# Patient Record
Sex: MALE | Race: WHITE | NOT HISPANIC OR LATINO | Employment: OTHER | URBAN - METROPOLITAN AREA
[De-identification: names, ages, dates, MRNs, and addresses within clinical notes are randomized per-mention and may not be internally consistent; named-entity substitution may affect disease eponyms.]

---

## 2017-01-13 ENCOUNTER — HOSPITAL ENCOUNTER (EMERGENCY)
Facility: HOSPITAL | Age: 47
Discharge: HOME | End: 2017-01-13
Attending: EMERGENCY MEDICINE
Payer: COMMERCIAL

## 2017-01-13 VITALS
DIASTOLIC BLOOD PRESSURE: 76 MMHG | RESPIRATION RATE: 20 BRPM | HEIGHT: 64 IN | SYSTOLIC BLOOD PRESSURE: 112 MMHG | OXYGEN SATURATION: 96 % | TEMPERATURE: 98 F | BODY MASS INDEX: 28.34 KG/M2 | WEIGHT: 166 LBS | HEART RATE: 90 BPM

## 2017-01-13 DIAGNOSIS — J06.9 UPPER RESPIRATORY TRACT INFECTION, UNSPECIFIED TYPE: Primary | ICD-10-CM

## 2017-01-13 PROCEDURE — 99283 EMERGENCY DEPT VISIT LOW MDM: CPT

## 2017-01-13 RX ORDER — AZITHROMYCIN 250 MG/1
250 TABLET, FILM COATED ORAL DAILY
Qty: 6 TABLET | Refills: 0 | Status: SHIPPED | OUTPATIENT
Start: 2017-01-13 | End: 2017-01-23

## 2017-01-13 NOTE — ED AVS SNAPSHOT
[facility] [address] [place] [address]               Logan Haywood   2017  8:38 PM   ED    Description:  Male : 1970   Department:  [facility]           Your Care was Coordinated By:     Provider Role From To    Jimmy Cisneros MD Attending Provider 17 [id number] --      Reason for Visit     URI           Diagnoses this Visit        Comments    Upper respiratory tract infection, unspecified type    -  Primary       ED Disposition     None           To Do List           Follow-up Information     Follow up with Primary Doctor No.        Follow up with [facility].    Specialty:  Emergency Medicine    Why:  If symptoms worsen.  Patient should return for any concerns or worsening of condition.    Contact information:    [address] [place] [id number] [phone]       These Medications        Disp Refills Start End    azithromycin (Z-LUIGI) 250 MG tablet 6 tablet 0 2017    Take 1 tablet (250 mg total) by mouth once daily. Take first 2 tablets together, then 1 every day until finished. - Oral      [facility]     [facility] -  Assistance  Registered nurses in the [facility] provide clinical advisement, health education, appointment booking, and other advisory services.  Call for this free service at [phone].             Medications           Message regarding Medications     Verify the changes and/or additions to your medication regime listed below are the same as discussed with your clinician today.  If any of these changes or additions are incorrect, please notify your healthcare provider.        START taking these NEW medications        Refills    azithromycin (Z-LUIGI) 250 MG tablet 0    Sig: Take 1 tablet (250 mg total) by mouth once daily. Take first 2 tablets together, then 1 every day until finished.     "Class: Print    Route: Oral           Verify that the below list of medications is an accurate representation of the medications you are currently taking.  If none reported, the list may be blank. If incorrect, please contact your healthcare provider. Carry this list with you in case of emergency.           Current Medications     azithromycin (Z-LUIGI) 250 MG tablet Take 1 tablet (250 mg total) by mouth once daily. Take first 2 tablets together, then 1 every day until finished.    diclofenac (VOLTAREN) 50 MG EC tablet Take 1 tablet (50 mg total) by mouth 3 (three) times daily as needed.    promethazine-dextromethorphan (PROMETHAZINE-DM) 6.25-15 mg/5 mL Syrp 1-2 tsp PO every night prn cough    silver sulfADIAZINE 1% (SILVADENE) 1 % cream Apply topically 2 (two) times daily.           Clinical Reference Information           Your Vitals Were     BP Pulse Temp Resp Height Weight    112/76 (BP Location: Left arm, Patient Position: Sitting) 90 98 °F (36.7 °C) (Oral) 20 5' 4" (1.626 m) 75.3 kg (166 lb)    SpO2 BMI             96% 28.49 kg/m2         Allergies as of 1/13/2017        Reactions    Cat/feline Products     Chocolate Flavor Itching      Immunizations Administered on Date of Encounter - 1/13/2017     None      ED Micro, Lab, POCT     None      ED Imaging Orders     None        Discharge Instructions         Viral Upper Respiratory Illness with Wheezing (Adult)  You have a viral upper respiratory illness (URI), which is another term for the common cold. When the infection causes a lot of irritation, the air passages can go into spasm. This causes wheezing and shortness of breath.    This illness is contagious during the first few days. It is spread through the air by coughing and sneezing. It may also be spread by direct contact (touching the sick person and then touching your own eyes, nose, or mouth). Frequent handwashing will decrease the risk.  Most viral illnesses go away within 7 to 10 days with rest and " simple home remedies. Sometimes the illness may last for several weeks. Antibiotics will not kill a virus, and they are generally not prescribed for this condition.  Home care  · If symptoms are severe, rest at home for the first 2 to 3 days. When you resume activity, don't let yourself get too tired.  · Avoid being exposed to cigarette smoke (yours or others).  · You may use acetaminophen or ibuprofen to control pain and fever, unless another medicine was prescribed. (Note: If you have chronic liver or kidney disease, have ever had a stomach ulcer or gastrointestinal bleeding, or are taking blood-thinning medicines, talk with your healthcare provider before using these medicines.) Aspirin should never be given to anyone under 18 years of age who is ill with a viral infection or fever. It may cause severe liver or brain damage.  · Your appetite may be poor, so a light diet is fine. Avoid dehydration by drinking 6 to 8 glasses of fluids per day (water, soft drinks, juices, tea, or soup). Extra fluids will help loosen secretions in the nose and lungs.  · Over-the-counter cold medicines will not shorten the length of time youre sick, but they may be helpful for the following symptoms: cough, sore throat, and nasal and sinus congestion. (Note: Do not use decongestants if you have high blood pressure.)  Follow-up care  Follow up with your healthcare provider, or as advised.  When to seek medical advice  Call your healthcare provider right away if any of these occur:  · Cough with lots of colored sputum (mucus)  · Severe headache; face, neck, or ear pain  · Difficulty swallowing due to throat pain  · Fever of 100.4ºF (38ºC) or higher, or as directed by your healthcare provider  Call 911, or get immediate medical care  Call emergency services right away if any of these occur:  · Chest pain, shortness of breath, worsening wheezing, or difficulty breathing  · Coughing up blood  · Inability to swallow due to throat pain  ©  6483-1401 [employer]. [address] [place] [address], [place] [address]. All rights reserved. This information is not intended as a substitute for professional medical care. Always follow your healthcare professional's instructions.          [facility] Sign-Up     Activating your [facility] account is as easy as 1-2-3!     1) Visit [email or web address], select Sign Up Now, enter this activation code and your date of birth, then select Next.  [email or web address]  Expires: 2/27/2017  8:43 PM      2) Create a username and password to use when you visit [facility] in the future and select a security question in case you lose your password and select Next.    3) Enter your e-mail address and click Sign Up!    Additional Information  If you have questions, please e-mail [email or web address] or call [phone] to talk to our [facility] staff. Remember, [facility] is NOT to be used for urgent needs. For medical emergencies, dial 911.         Smoking Cessation     If you would like to quit smoking:   You may be eligible for free services if you are a [place] resident and started smoking cigarettes before September 1, 1988.  Call the Smoking Cessation Trust (SCT) toll free at (241) 703-0539 or (262) 188-5651.   Call 6-800-QUIT-NOW if you do not meet the above criteria.             [facility] complies with applicable Federal civil rights laws and does not discriminate on the basis of race, color, national origin, age, disability, or sex.        Language Assistance Services     ATTENTION: Language assistance services are available, free of charge. Please call [phone].      ATENCIÓN: Si habla español, tiene a perales disposición servicios gratuitos de asistencia lingüística. Llame al [phone].     CHÚ Ý: N?u b?n nói Ti?ng Vi?t, có các d?ch v? h? tr? ngôn ng? mi?n phí dành cho b?n. G?i s? [phone].

## 2017-01-14 NOTE — DISCHARGE INSTRUCTIONS
Viral Upper Respiratory Illness with Wheezing (Adult)  You have a viral upper respiratory illness (URI), which is another term for the common cold. When the infection causes a lot of irritation, the air passages can go into spasm. This causes wheezing and shortness of breath.    This illness is contagious during the first few days. It is spread through the air by coughing and sneezing. It may also be spread by direct contact (touching the sick person and then touching your own eyes, nose, or mouth). Frequent handwashing will decrease the risk.  Most viral illnesses go away within 7 to 10 days with rest and simple home remedies. Sometimes the illness may last for several weeks. Antibiotics will not kill a virus, and they are generally not prescribed for this condition.  Home care  · If symptoms are severe, rest at home for the first 2 to 3 days. When you resume activity, don't let yourself get too tired.  · Avoid being exposed to cigarette smoke (yours or others).  · You may use acetaminophen or ibuprofen to control pain and fever, unless another medicine was prescribed. (Note: If you have chronic liver or kidney disease, have ever had a stomach ulcer or gastrointestinal bleeding, or are taking blood-thinning medicines, talk with your healthcare provider before using these medicines.) Aspirin should never be given to anyone under 18 years of age who is ill with a viral infection or fever. It may cause severe liver or brain damage.  · Your appetite may be poor, so a light diet is fine. Avoid dehydration by drinking 6 to 8 glasses of fluids per day (water, soft drinks, juices, tea, or soup). Extra fluids will help loosen secretions in the nose and lungs.  · Over-the-counter cold medicines will not shorten the length of time youre sick, but they may be helpful for the following symptoms: cough, sore throat, and nasal and sinus congestion. (Note: Do not use decongestants if you have high blood pressure.)  Follow-up  care  Follow up with your healthcare provider, or as advised.  When to seek medical advice  Call your healthcare provider right away if any of these occur:  · Cough with lots of colored sputum (mucus)  · Severe headache; face, neck, or ear pain  · Difficulty swallowing due to throat pain  · Fever of 100.4ºF (38ºC) or higher, or as directed by your healthcare provider  Call 911, or get immediate medical care  Call emergency services right away if any of these occur:  · Chest pain, shortness of breath, worsening wheezing, or difficulty breathing  · Coughing up blood  · Inability to swallow due to throat pain  © 7866-7208 [employer]. [address] [place] [address], [place] [address]. All rights reserved. This information is not intended as a substitute for professional medical care. Always follow your healthcare professional's instructions.

## 2017-01-14 NOTE — ED PROVIDER NOTES
"SCRIBE #1 NOTE: I, Emerson Mary, am scribing for, and in the presence of, Jimmy Cisneros MD. I have scribed the entire note.      History      Chief Complaint   Patient presents with    URI     "i just need some abx for my sinus infection". reports cough, congestion, sore throat       Review of patient's allergies indicates:   Allergen Reactions    Cat/feline products     Chocolate flavor Itching        HPI   HPI    1/13/2017, 8:42 PM   History obtained from the patient      History of Present Illness: Logan Haywood is a 46 y.o. male patient who presents to the Emergency Department for UTI symptoms worsening in severity since this morning. He reports cough, congestion and sore throat. His sxs are constant and moderate in severity. No modifying factors reported. Patient denies any headache, dizziness, light-headedness, CP, SOB, abdominal pain, N/V/D or any other sx at this time. No further complaints or concerns at this time.     Arrival mode: Personal vehicle     PCP: Primary Doctor No       Past Medical History:  No past medical history on file.    Past Surgical History:  Past Surgical History   Procedure Laterality Date    Tonsillectomy           Family History:  No family history on file.    Social History:  Social History     Social History Main Topics    Smoking status: Current Every Day Smoker     Packs/day: 0.50     Types: Cigarettes    Smokeless tobacco: Not on file    Alcohol use Yes      Comment: seldom    Drug use: No    Sexual activity: Not on file       ROS   Review of Systems   Constitutional: Negative for chills and fever.   HENT: Positive for congestion and sore throat.    Respiratory: Positive for cough. Negative for shortness of breath.    Cardiovascular: Negative for chest pain.   Gastrointestinal: Negative for abdominal pain, diarrhea, nausea and vomiting.   Genitourinary: Negative for dysuria.   Musculoskeletal: Negative for back pain.   Skin: Negative for rash. " "  Neurological: Negative for dizziness, weakness, light-headedness and headaches.   Hematological: Does not bruise/bleed easily.       Physical Exam    Initial Vitals   BP Pulse Resp Temp SpO2   01/13/17 2016 01/13/17 2016 01/13/17 2016 01/13/17 2016 01/13/17 2016   112/76 90 20 98 °F (36.7 °C) 96 %      Physical Exam  Nursing Notes and Vital Signs Reviewed.  Constitutional: Patient is in no acute distress. Awake and alert. Well-developed and well-nourished.  Head: Atraumatic. Normocephalic.  Eyes: PERRL. EOM intact. Conjunctivae are not pale. No scleral icterus.  ENT: Mucous membranes are moist. Oropharynx is clear and symmetric.    Neck: Supple. Full ROM. No lymphadenopathy.  Cardiovascular: Regular rate. Regular rhythm. No murmurs, rubs, or gallops. Distal pulses are 2+ and symmetric.  Pulmonary/Chest: No respiratory distress. Clear to auscultation bilaterally. No wheezing, rales, or rhonchi.  Abdominal: Soft and non-distended.  There is no tenderness.  No rebound, guarding, or rigidity.  Good bowel sounds.    Genitourinary: No CVA tenderness  Musculoskeletal: Moves all extremities. No obvious deformities. No edema. No calf tenderness.  Skin: Warm and dry.  Neurological:  Alert, awake, and appropriate.  Normal speech.  No acute focal neurological deficits are appreciated.  Psychiatric: Normal affect. Good eye contact. Appropriate in content.    ED Course    Procedures  ED Vital Signs:  Vitals:    01/13/17 2016   BP: 112/76   Pulse: 90   Resp: 20   Temp: 98 °F (36.7 °C)   TempSrc: Oral   SpO2: 96%   Weight: 75.3 kg (166 lb)   Height: 5' 4" (1.626 m)            The Emergency Provider reviewed the vital signs and test results, which are outlined above.    ED Discussion     8:43 PM: Discussed with pt all pertinent ED information and results. Discussed pt dx and plan of tx. Gave pt all f/u and return to the ED instructions. All questions and concerns were addressed at this time. Pt expresses understanding of " information and instructions, and is comfortable with plan to discharge. Pt is stable for discharge.    New Prescriptions    AZITHROMYCIN (Z-LUIGI) 250 MG TABLET    Take 1 tablet (250 mg total) by mouth once daily. Take first 2 tablets together, then 1 every day until finished.       Follow-up Information     Follow up with Primary Doctor No.        Follow up with [facility].    Specialty:  Emergency Medicine    Why:  If symptoms worsen.  Patient should return for any concerns or worsening of condition.    Contact information:    [address] [facility] [address] [phone]            Medical Decision Making              Scribe Attestation:   Scribe #1: I performed the above scribed service and the documentation accurately describes the services I performed. I attest to the accuracy of the note.    Attending:   Physician Attestation Statement for Scribe #1: I, Jimmy Cisneros MD, personally performed the services described in this documentation, as scribed by Emerson Mary, in my presence, and it is both accurate and complete.          Clinical Impression       ICD-10-CM ICD-9-CM   1. Upper respiratory tract infection, unspecified type J06.9 465.9       Disposition:   Disposition: Discharged  Condition: Stable       Jimmy Cisneros MD  01/13/17 0642

## 2018-02-26 PROBLEM — R20.0 NUMBNESS AND TINGLING: Status: ACTIVE | Noted: 2018-02-26

## 2018-02-26 PROBLEM — Z72.0 TOBACCO ABUSE: Status: ACTIVE | Noted: 2018-02-26

## 2018-02-26 PROBLEM — R20.2 NUMBNESS AND TINGLING: Status: ACTIVE | Noted: 2018-02-26

## 2018-02-27 ENCOUNTER — TELEPHONE (OUTPATIENT)
Dept: NEPHROLOGY | Facility: CLINIC | Age: 48
End: 2018-02-27

## 2018-02-27 ENCOUNTER — HOSPITAL ENCOUNTER (INPATIENT)
Facility: HOSPITAL | Age: 48
LOS: 5 days | Discharge: HOME | DRG: 060 | End: 2018-03-04
Attending: EMERGENCY MEDICINE | Admitting: FAMILY MEDICINE
Payer: COMMERCIAL

## 2018-02-27 DIAGNOSIS — G35 MS (MULTIPLE SCLEROSIS): Primary | ICD-10-CM

## 2018-02-27 PROBLEM — F32.A DEPRESSION: Status: ACTIVE | Noted: 2018-02-27

## 2018-02-27 LAB
ALBUMIN SERPL BCP-MCNC: 3.9 G/DL
ALBUMIN SERPL BCP-MCNC: 3.9 G/DL
ALP SERPL-CCNC: 83 U/L
ALP SERPL-CCNC: 84 U/L
ALT SERPL W/O P-5'-P-CCNC: 22 U/L
ALT SERPL W/O P-5'-P-CCNC: 23 U/L
ANION GAP SERPL CALC-SCNC: 11 MMOL/L
ANION GAP SERPL CALC-SCNC: 11 MMOL/L
APTT BLDCRRT: 24.7 SEC
AST SERPL-CCNC: 15 U/L
AST SERPL-CCNC: 15 U/L
BASOPHILS # BLD AUTO: 0.02 K/UL
BASOPHILS NFR BLD: 0.2 %
BILIRUB SERPL-MCNC: 0.5 MG/DL
BILIRUB SERPL-MCNC: 0.5 MG/DL
BUN SERPL-MCNC: 10 MG/DL
BUN SERPL-MCNC: 10 MG/DL
CALCIUM SERPL-MCNC: 8.9 MG/DL
CALCIUM SERPL-MCNC: 9.2 MG/DL
CHLORIDE SERPL-SCNC: 107 MMOL/L
CHLORIDE SERPL-SCNC: 109 MMOL/L
CLARITY CSF: CLEAR
CO2 SERPL-SCNC: 21 MMOL/L
CO2 SERPL-SCNC: 24 MMOL/L
COLOR CSF: COLORLESS
CREAT SERPL-MCNC: 0.8 MG/DL
CREAT SERPL-MCNC: 0.9 MG/DL
DIFFERENTIAL METHOD: ABNORMAL
EOSINOPHIL # BLD AUTO: 0.1 K/UL
EOSINOPHIL NFR BLD: 1.2 %
ERYTHROCYTE [DISTWIDTH] IN BLOOD BY AUTOMATED COUNT: 12.6 %
ERYTHROCYTE [SEDIMENTATION RATE] IN BLOOD BY WESTERGREN METHOD: 8 MM/HR
EST. GFR  (AFRICAN AMERICAN): >60 ML/MIN/1.73 M^2
EST. GFR  (AFRICAN AMERICAN): >60 ML/MIN/1.73 M^2
EST. GFR  (NON AFRICAN AMERICAN): >60 ML/MIN/1.73 M^2
EST. GFR  (NON AFRICAN AMERICAN): >60 ML/MIN/1.73 M^2
GLUCOSE CSF-MCNC: 74 MG/DL
GLUCOSE SERPL-MCNC: 109 MG/DL
GLUCOSE SERPL-MCNC: 96 MG/DL
HCT VFR BLD AUTO: 45.3 %
HGB BLD-MCNC: 16 G/DL
HIV1+2 IGG SERPL QL IA.RAPID: NEGATIVE
INR PPP: 1
LYMPHOCYTES # BLD AUTO: 1.9 K/UL
LYMPHOCYTES NFR BLD: 23.2 %
MCH RBC QN AUTO: 31.5 PG
MCHC RBC AUTO-ENTMCNC: 35.3 G/DL
MCV RBC AUTO: 89 FL
MONOCYTES # BLD AUTO: 0.5 K/UL
MONOCYTES NFR BLD: 5.7 %
NEUTROPHILS # BLD AUTO: 5.8 K/UL
NEUTROPHILS NFR BLD: 69.7 %
PLATELET # BLD AUTO: 187 K/UL
PMV BLD AUTO: 10.2 FL
POCT GLUCOSE: 139 MG/DL (ref 70–110)
POTASSIUM SERPL-SCNC: 3.6 MMOL/L
POTASSIUM SERPL-SCNC: 3.8 MMOL/L
PROT CSF-MCNC: 50 MG/DL
PROT SERPL-MCNC: 7.1 G/DL
PROT SERPL-MCNC: 7.2 G/DL
PROTHROMBIN TIME: 10.1 SEC
RBC # BLD AUTO: 5.08 M/UL
RBC # CSF: 2 /CU MM
RPR SER QL: NORMAL
SODIUM SERPL-SCNC: 141 MMOL/L
SODIUM SERPL-SCNC: 142 MMOL/L
SPECIMEN VOL CSF: 5 ML
WBC # BLD AUTO: 8.27 K/UL
WBC # CSF: 2 /CU MM

## 2018-02-27 PROCEDURE — 21400001 HC TELEMETRY ROOM

## 2018-02-27 PROCEDURE — 82945 GLUCOSE OTHER FLUID: CPT

## 2018-02-27 PROCEDURE — 25000003 PHARM REV CODE 250: Performed by: PHYSICIAN ASSISTANT

## 2018-02-27 PROCEDURE — S4991 NICOTINE PATCH NONLEGEND: HCPCS | Performed by: PHYSICIAN ASSISTANT

## 2018-02-27 PROCEDURE — 99223 1ST HOSP IP/OBS HIGH 75: CPT | Mod: ,,, | Performed by: PSYCHIATRY & NEUROLOGY

## 2018-02-27 PROCEDURE — 86592 SYPHILIS TEST NON-TREP QUAL: CPT

## 2018-02-27 PROCEDURE — 86038 ANTINUCLEAR ANTIBODIES: CPT

## 2018-02-27 PROCEDURE — 25000003 PHARM REV CODE 250: Performed by: PSYCHIATRY & NEUROLOGY

## 2018-02-27 PROCEDURE — 99285 EMERGENCY DEPT VISIT HI MDM: CPT

## 2018-02-27 PROCEDURE — 89051 BODY FLUID CELL COUNT: CPT

## 2018-02-27 PROCEDURE — 84157 ASSAY OF PROTEIN OTHER: CPT

## 2018-02-27 PROCEDURE — 82042 OTHER SOURCE ALBUMIN QUAN EA: CPT

## 2018-02-27 PROCEDURE — 87070 CULTURE OTHR SPECIMN AEROBIC: CPT

## 2018-02-27 PROCEDURE — 87205 SMEAR GRAM STAIN: CPT

## 2018-02-27 PROCEDURE — 85651 RBC SED RATE NONAUTOMATED: CPT

## 2018-02-27 PROCEDURE — 36415 COLL VENOUS BLD VENIPUNCTURE: CPT

## 2018-02-27 PROCEDURE — 85610 PROTHROMBIN TIME: CPT

## 2018-02-27 PROCEDURE — 86703 HIV-1/HIV-2 1 RESULT ANTBDY: CPT

## 2018-02-27 PROCEDURE — 85730 THROMBOPLASTIN TIME PARTIAL: CPT

## 2018-02-27 PROCEDURE — 63600175 PHARM REV CODE 636 W HCPCS: Performed by: PSYCHIATRY & NEUROLOGY

## 2018-02-27 PROCEDURE — 85025 COMPLETE CBC W/AUTO DIFF WBC: CPT

## 2018-02-27 PROCEDURE — 009U3ZX DRAINAGE OF SPINAL CANAL, PERCUTANEOUS APPROACH, DIAGNOSTIC: ICD-10-PCS | Performed by: RADIOLOGY

## 2018-02-27 PROCEDURE — 83615 LACTATE (LD) (LDH) ENZYME: CPT

## 2018-02-27 PROCEDURE — 83873 ASSAY OF CSF PROTEIN: CPT

## 2018-02-27 PROCEDURE — 99000 SPECIMEN HANDLING OFFICE-LAB: CPT

## 2018-02-27 PROCEDURE — 80053 COMPREHEN METABOLIC PANEL: CPT

## 2018-02-27 PROCEDURE — 80053 COMPREHEN METABOLIC PANEL: CPT | Mod: 91

## 2018-02-27 PROCEDURE — 25000003 PHARM REV CODE 250: Performed by: NURSE PRACTITIONER

## 2018-02-27 RX ORDER — PAROXETINE HYDROCHLORIDE 20 MG/1
20 TABLET, FILM COATED ORAL DAILY
Status: DISCONTINUED | OUTPATIENT
Start: 2018-02-27 | End: 2018-03-04 | Stop reason: HOSPADM

## 2018-02-27 RX ORDER — ONDANSETRON 2 MG/ML
4 INJECTION INTRAMUSCULAR; INTRAVENOUS EVERY 12 HOURS PRN
Status: DISCONTINUED | OUTPATIENT
Start: 2018-02-27 | End: 2018-03-04 | Stop reason: HOSPADM

## 2018-02-27 RX ORDER — FAMOTIDINE 20 MG/1
20 TABLET, FILM COATED ORAL 2 TIMES DAILY
Status: DISCONTINUED | OUTPATIENT
Start: 2018-02-27 | End: 2018-03-04 | Stop reason: HOSPADM

## 2018-02-27 RX ORDER — METHOCARBAMOL 500 MG/1
500 TABLET, FILM COATED ORAL EVERY 6 HOURS PRN
Status: DISCONTINUED | OUTPATIENT
Start: 2018-02-27 | End: 2018-02-27

## 2018-02-27 RX ORDER — ERGOCALCIFEROL 1.25 MG/1
50000 CAPSULE ORAL
Status: DISCONTINUED | OUTPATIENT
Start: 2018-02-28 | End: 2018-03-04 | Stop reason: HOSPADM

## 2018-02-27 RX ORDER — METHOCARBAMOL 500 MG/1
500 TABLET, FILM COATED ORAL 2 TIMES DAILY PRN
Status: DISCONTINUED | OUTPATIENT
Start: 2018-02-27 | End: 2018-03-04 | Stop reason: HOSPADM

## 2018-02-27 RX ORDER — IBUPROFEN 200 MG
1 TABLET ORAL DAILY
Status: DISCONTINUED | OUTPATIENT
Start: 2018-02-27 | End: 2018-03-04 | Stop reason: HOSPADM

## 2018-02-27 RX ORDER — HYDROCODONE BITARTRATE AND ACETAMINOPHEN 10; 325 MG/1; MG/1
1 TABLET ORAL EVERY 6 HOURS PRN
Status: DISCONTINUED | OUTPATIENT
Start: 2018-02-27 | End: 2018-03-04 | Stop reason: HOSPADM

## 2018-02-27 RX ORDER — CLONAZEPAM 0.5 MG/1
0.5 TABLET ORAL 2 TIMES DAILY
Status: DISCONTINUED | OUTPATIENT
Start: 2018-02-27 | End: 2018-02-28

## 2018-02-27 RX ORDER — ZOLPIDEM TARTRATE 5 MG/1
5 TABLET ORAL NIGHTLY PRN
Status: DISCONTINUED | OUTPATIENT
Start: 2018-02-27 | End: 2018-02-27

## 2018-02-27 RX ORDER — AMOXICILLIN 250 MG
1 CAPSULE ORAL 2 TIMES DAILY
Status: DISCONTINUED | OUTPATIENT
Start: 2018-02-27 | End: 2018-03-04 | Stop reason: HOSPADM

## 2018-02-27 RX ORDER — POLYETHYLENE GLYCOL 3350 17 G/17G
17 POWDER, FOR SOLUTION ORAL DAILY
Status: DISCONTINUED | OUTPATIENT
Start: 2018-02-27 | End: 2018-03-04 | Stop reason: HOSPADM

## 2018-02-27 RX ORDER — ACETAMINOPHEN 325 MG/1
650 TABLET ORAL EVERY 8 HOURS PRN
Status: DISCONTINUED | OUTPATIENT
Start: 2018-02-27 | End: 2018-03-04 | Stop reason: HOSPADM

## 2018-02-27 RX ORDER — MAG HYDROX/ALUMINUM HYD/SIMETH 200-200-20
15 SUSPENSION, ORAL (FINAL DOSE FORM) ORAL EVERY 8 HOURS PRN
Status: DISCONTINUED | OUTPATIENT
Start: 2018-02-27 | End: 2018-03-04 | Stop reason: HOSPADM

## 2018-02-27 RX ADMIN — POLYETHYLENE GLYCOL 3350 17 G: 17 POWDER, FOR SOLUTION ORAL at 03:02

## 2018-02-27 RX ADMIN — NICOTINE 1 PATCH: 21 PATCH, EXTENDED RELEASE TRANSDERMAL at 12:02

## 2018-02-27 RX ADMIN — CLONAZEPAM 0.5 MG: 0.5 TABLET ORAL at 09:02

## 2018-02-27 RX ADMIN — HYDROCODONE BITARTRATE AND ACETAMINOPHEN 1 TABLET: 10; 325 TABLET ORAL at 03:02

## 2018-02-27 RX ADMIN — METHOCARBAMOL 500 MG: 500 TABLET ORAL at 02:02

## 2018-02-27 RX ADMIN — DEXTROSE 500 MG: 50 INJECTION, SOLUTION INTRAVENOUS at 09:02

## 2018-02-27 RX ADMIN — HYDROCODONE BITARTRATE AND ACETAMINOPHEN 1 TABLET: 10; 325 TABLET ORAL at 09:02

## 2018-02-27 RX ADMIN — FAMOTIDINE 20 MG: 20 TABLET ORAL at 09:02

## 2018-02-27 RX ADMIN — STANDARDIZED SENNA CONCENTRATE AND DOCUSATE SODIUM 1 TABLET: 8.6; 5 TABLET, FILM COATED ORAL at 09:02

## 2018-02-27 RX ADMIN — PAROXETINE HYDROCHLORIDE 20 MG: 20 TABLET, FILM COATED ORAL at 06:02

## 2018-02-27 NOTE — OP NOTE
Sterile technique was performed in the lower back, lidocaine was used as a local anesthetic.  OP 21 cm of h2o, 11 ccs of clear csf to lab.  Pt tolerated the procedure well without immediate complications.  Please see radiologist report for details. F/u with PCP and/or ordering physician.

## 2018-02-27 NOTE — SUBJECTIVE & OBJECTIVE
History reviewed. No pertinent past medical history.    Past Surgical History:   Procedure Laterality Date    TONSILLECTOMY         Review of patient's allergies indicates:   Allergen Reactions    Cat/feline products     Chocolate flavor Itching       Current Facility-Administered Medications on File Prior to Encounter   Medication    [COMPLETED] gadobutrol 7 mL    [DISCONTINUED] ALPRAZolam tablet 0.5 mg    [DISCONTINUED] methocarbamol tablet 1,000 mg    [DISCONTINUED] methylPREDNISolone sodium succinate injection 500 mg    [DISCONTINUED] nicotine 21 mg/24 hr 1 patch    [DISCONTINUED] nicotine 21 mg/24 hr 1 patch    [DISCONTINUED] pantoprazole EC tablet 40 mg     Current Outpatient Prescriptions on File Prior to Encounter   Medication Sig    diclofenac (VOLTAREN) 50 MG EC tablet Take 1 tablet (50 mg total) by mouth 3 (three) times daily as needed.    promethazine-dextromethorphan (PROMETHAZINE-DM) 6.25-15 mg/5 mL Syrp 1-2 tsp PO every night prn cough    silver sulfADIAZINE 1% (SILVADENE) 1 % cream Apply topically 2 (two) times daily.     Family History     Problem Relation (Age of Onset)    COPD Mother    Diabetes Sister    Heart disease Father    Hypertension Mother    Stroke Brother        Social History Main Topics    Smoking status: Current Every Day Smoker     Packs/day: 1.00     Types: Cigarettes    Smokeless tobacco: Not on file    Alcohol use Yes      Comment: seldom    Drug use: No    Sexual activity: Not on file     Review of Systems   Constitutional: Positive for fatigue. Negative for chills, diaphoresis and fever.   HENT: Negative for congestion, ear discharge, rhinorrhea, sinus pressure, sore throat and trouble swallowing.         Left neck pain   Eyes: Negative for discharge and visual disturbance.   Respiratory: Negative for apnea, cough, choking, chest tightness, shortness of breath, wheezing and stridor.    Cardiovascular: Negative for chest pain, palpitations and leg swelling.    Gastrointestinal: Negative for abdominal distention, abdominal pain, diarrhea, nausea and vomiting.   Endocrine: Negative for cold intolerance and heat intolerance.   Genitourinary: Negative for dysuria, frequency and hematuria.   Musculoskeletal: Negative for arthralgias, back pain, myalgias and neck pain.   Skin: Negative for pallor and rash.   Neurological: Positive for weakness and numbness. Negative for dizziness, seizures, syncope and headaches.   Psychiatric/Behavioral: Negative for agitation, confusion and sleep disturbance.     Objective:     Vital Signs (Most Recent):  Temp: 97.3 °F (36.3 °C) (02/27/18 1603)  Pulse: 75 (02/27/18 1603)  Resp: 16 (02/27/18 1603)  BP: 124/72 (02/27/18 1603)  SpO2: 97 % (02/27/18 1603) Vital Signs (24h Range):  Temp:  [97.3 °F (36.3 °C)-98.2 °F (36.8 °C)] 97.3 °F (36.3 °C)  Pulse:  [73-97] 75  Resp:  [16-18] 16  SpO2:  [95 %-98 %] 97 %  BP: (102-124)/(64-86) 124/72     Weight: 71.4 kg (157 lb 6.5 oz)  Body mass index is 27.02 kg/m².    Physical Exam   Constitutional: No distress.   HENT:   Head: Normocephalic and atraumatic.   Eyes: Right eye exhibits no discharge. Left eye exhibits no discharge.   Cardiovascular: Exam reveals no gallop and no friction rub.    No murmur heard.  Pulmonary/Chest: No respiratory distress. He has no wheezes. He has no rales. He exhibits no tenderness.   Abdominal: He exhibits no distension and no mass. There is no tenderness. There is no rebound and no guarding. No hernia.   Musculoskeletal: He exhibits no edema.   Neurological: He displays normal reflexes. No cranial nerve deficit or sensory deficit. He exhibits abnormal muscle tone (Lt arm ). Coordination normal.   Skin: Skin is warm and dry. Capillary refill takes 2 to 3 seconds. He is not diaphoretic.   Psychiatric: He has a normal mood and affect. His behavior is normal. Judgment and thought content normal.   Nursing note and vitals reviewed.          Significant Labs:     Results for  orders placed or performed during the hospital encounter of 02/27/18   Protime-INR   Result Value Ref Range    Prothrombin Time 10.1 9.0 - 12.5 sec    INR 1.0 0.8 - 1.2   APTT   Result Value Ref Range    aPTT 24.7 21.0 - 32.0 sec   Comprehensive metabolic panel   Result Value Ref Range    Sodium 141 136 - 145 mmol/L    Potassium 3.6 3.5 - 5.1 mmol/L    Chloride 109 95 - 110 mmol/L    CO2 21 (L) 23 - 29 mmol/L    Glucose 109 70 - 110 mg/dL    BUN, Bld 10 6 - 20 mg/dL    Creatinine 0.9 0.5 - 1.4 mg/dL    Calcium 9.2 8.7 - 10.5 mg/dL    Total Protein 7.2 6.0 - 8.4 g/dL    Albumin 3.9 3.5 - 5.2 g/dL    Total Bilirubin 0.5 0.1 - 1.0 mg/dL    Alkaline Phosphatase 83 55 - 135 U/L    AST 15 10 - 40 U/L    ALT 22 10 - 44 U/L    Anion Gap 11 8 - 16 mmol/L    eGFR if African American >60 >60 mL/min/1.73 m^2    eGFR if non African American >60 >60 mL/min/1.73 m^2   CBC auto differential   Result Value Ref Range    WBC 8.27 3.90 - 12.70 K/uL    RBC 5.08 4.60 - 6.20 M/uL    Hemoglobin 16.0 14.0 - 18.0 g/dL    Hematocrit 45.3 40.0 - 54.0 %    MCV 89 82 - 98 fL    MCH 31.5 (H) 27.0 - 31.0 pg    MCHC 35.3 32.0 - 36.0 g/dL    RDW 12.6 11.5 - 14.5 %    Platelets 187 150 - 350 K/uL    MPV 10.2 9.2 - 12.9 fL    Gran # (ANC) 5.8 1.8 - 7.7 K/uL    Lymph # 1.9 1.0 - 4.8 K/uL    Mono # 0.5 0.3 - 1.0 K/uL    Eos # 0.1 0.0 - 0.5 K/uL    Baso # 0.02 0.00 - 0.20 K/uL    Gran% 69.7 38.0 - 73.0 %    Lymph% 23.2 18.0 - 48.0 %    Mono% 5.7 4.0 - 15.0 %    Eosinophil% 1.2 0.0 - 8.0 %    Basophil% 0.2 0.0 - 1.9 %    Differential Method Automated    CSF cell count with differential   Result Value Ref Range    Heme Aliquot 5.0 mL    Appearance, CSF Clear Clear    Color, CSF Colorless Colorless    WBC, CSF 2 0 - 5 /cu mm    RBC, CSF 2 (A) 0 /cu mm   Glucose, CSF   Result Value Ref Range    Glucose, CSF 74 (H) 40 - 70 mg/dL   Protein, CSF   Result Value Ref Range    Protein, CSF 50 (H) 15 - 40 mg/dL   Rapid HIV   Result Value Ref Range    HIV Rapid  Testing Negative Negative   Sedimentation rate, manual   Result Value Ref Range    Sed Rate 8 0 - 10 mm/Hr       Significant Imaging:     Imaging Results          FL Lumbar Puncture (xpd) (Final result)  Result time 02/27/18 15:00:15    Final result by Trudy Leger MD (02/27/18 15:00:15)                 Impression:      1. Successful fluoroscopically guided lumbar puncture.      Electronically signed by: TRUDY LEGER MD  Date:     02/27/18  Time:    15:00              Narrative:    Procedure:  Lumbar puncture, diagnostic    Clinical History:     Multiple sclerosis.    Technique/findings: After the risks, benefits and options of the planned procedure were explained to the patient in full detail, the patient expressed complete understanding and gave signed informed consent. procedure was performed by David Aguiar PA-C under the direct supervision of Dr. VASHTI Leger.  The left paraspinous space was localized with fluoroscopy. The skin was marked with indelible ink.  The skin overlying this area was prepped and draped in the usual sterile fashion. A timeout was performed. 1% lidocaine was used as a local anesthetic, and a 22 gauge needle were used to perform a lumbar puncture at L3-L4.  Blunt cc of clear CSF was removed with an opening pressure of 21 cm of water.  The needle was then removed.  A sterile dressing was applied.  The patient tolerated the procedure well without complication, departing the fluoro suite  in unchanged condition.     Fluoroscopic time was  0.1 minutes  and 1 image was obtained.

## 2018-02-27 NOTE — H&P
[facility] Hospital Medicine  History & Physical    Patient Name: Logan Haywood  MRN: [id number]  Admission Date: 2/27/2018  Attending Physician: Bandar George MD   Primary Care Provider: Primary Doctor No         Patient information was obtained from patient and ER records.     Subjective:     Principal Problem:MS (multiple sclerosis)    Chief Complaint:   Chief Complaint   Patient presents with    Multiple Sclerosis     pt was seen in ED last night and dx with MS and reports was admitted but left and is hurting all over         HPI: Mr Haywood is a 47 year old male with PMHx of tobacco use. Patient was seen in the Emergency Room on yesterday as well, however left AMA. He presented to [facility] Emergency Room with numbness and weakness to the left side of body. Associated symptoms include left side neck pain. He has numbness  to his left upper extremity as well as his anterior and posterior torso. He does not have numbness in his left lower extremity. Denies associated symptoms shortness of breath or chest pain. MRI done yesterday showed findings most concerning for demyelinating disease and multiple sclerosis. Patient underwent lumbar puncture today as well. He has been evaluated by Neurology today as well.     History reviewed. No pertinent past medical history.    Past Surgical History:   Procedure Laterality Date    TONSILLECTOMY         Review of patient's allergies indicates:   Allergen Reactions    Cat/feline products     Chocolate flavor Itching       Current Facility-Administered Medications on File Prior to Encounter   Medication    [COMPLETED] gadobutrol 7 mL    [DISCONTINUED] ALPRAZolam tablet 0.5 mg    [DISCONTINUED] methocarbamol tablet 1,000 mg    [DISCONTINUED] methylPREDNISolone sodium succinate injection 500 mg    [DISCONTINUED] nicotine 21 mg/24 hr 1 patch    [DISCONTINUED] nicotine 21 mg/24 hr 1 patch    [DISCONTINUED] pantoprazole EC tablet 40 mg     Current  Outpatient Prescriptions on File Prior to Encounter   Medication Sig    diclofenac (VOLTAREN) 50 MG EC tablet Take 1 tablet (50 mg total) by mouth 3 (three) times daily as needed.    promethazine-dextromethorphan (PROMETHAZINE-DM) 6.25-15 mg/5 mL Syrp 1-2 tsp PO every night prn cough    silver sulfADIAZINE 1% (SILVADENE) 1 % cream Apply topically 2 (two) times daily.     Family History     Problem Relation (Age of Onset)    COPD Mother    Diabetes Sister    Heart disease Father    Hypertension Mother    Stroke Brother        Social History Main Topics    Smoking status: Current Every Day Smoker     Packs/day: 1.00     Types: Cigarettes    Smokeless tobacco: Not on file    Alcohol use Yes      Comment: seldom    Drug use: No    Sexual activity: Not on file     Review of Systems   Constitutional: Positive for fatigue. Negative for chills, diaphoresis and fever.   HENT: Negative for congestion, ear discharge, rhinorrhea, sinus pressure, sore throat and trouble swallowing.         Left neck pain   Eyes: Negative for discharge and visual disturbance.   Respiratory: Negative for apnea, cough, choking, chest tightness, shortness of breath, wheezing and stridor.    Cardiovascular: Negative for chest pain, palpitations and leg swelling.   Gastrointestinal: Negative for abdominal distention, abdominal pain, diarrhea, nausea and vomiting.   Endocrine: Negative for cold intolerance and heat intolerance.   Genitourinary: Negative for dysuria, frequency and hematuria.   Musculoskeletal: Negative for arthralgias, back pain, myalgias and neck pain.   Skin: Negative for pallor and rash.   Neurological: Positive for weakness and numbness. Negative for dizziness, seizures, syncope and headaches.   Psychiatric/Behavioral: Negative for agitation, confusion and sleep disturbance.     Objective:     Vital Signs (Most Recent):  Temp: 97.3 °F (36.3 °C) (02/27/18 1603)  Pulse: 75 (02/27/18 1603)  Resp: 16 (02/27/18 1603)  BP: 124/72  (02/27/18 1603)  SpO2: 97 % (02/27/18 1603) Vital Signs (24h Range):  Temp:  [97.3 °F (36.3 °C)-98.2 °F (36.8 °C)] 97.3 °F (36.3 °C)  Pulse:  [73-97] 75  Resp:  [16-18] 16  SpO2:  [95 %-98 %] 97 %  BP: (102-124)/(64-86) 124/72     Weight: 71.4 kg (157 lb 6.5 oz)  Body mass index is 27.02 kg/m².    Physical Exam   Constitutional: No distress.   HENT:   Head: Normocephalic and atraumatic.   Eyes: Right eye exhibits no discharge. Left eye exhibits no discharge.   Cardiovascular: Exam reveals no gallop and no friction rub.    No murmur heard.  Pulmonary/Chest: No respiratory distress. He has no wheezes. He has no rales. He exhibits no tenderness.   Abdominal: He exhibits no distension and no mass. There is no tenderness. There is no rebound and no guarding. No hernia.   Musculoskeletal: He exhibits no edema.   Neurological: He displays normal reflexes. No cranial nerve deficit or sensory deficit. He exhibits abnormal muscle tone (Lt arm ). Coordination normal.   Skin: Skin is warm and dry. Capillary refill takes 2 to 3 seconds. He is not diaphoretic.   Psychiatric: He has a normal mood and affect. His behavior is normal. Judgment and thought content normal.   Nursing note and vitals reviewed.          Significant Labs:     Results for orders placed or performed during the hospital encounter of 02/27/18   Protime-INR   Result Value Ref Range    Prothrombin Time 10.1 9.0 - 12.5 sec    INR 1.0 0.8 - 1.2   APTT   Result Value Ref Range    aPTT 24.7 21.0 - 32.0 sec   Comprehensive metabolic panel   Result Value Ref Range    Sodium 141 136 - 145 mmol/L    Potassium 3.6 3.5 - 5.1 mmol/L    Chloride 109 95 - 110 mmol/L    CO2 21 (L) 23 - 29 mmol/L    Glucose 109 70 - 110 mg/dL    BUN, Bld 10 6 - 20 mg/dL    Creatinine 0.9 0.5 - 1.4 mg/dL    Calcium 9.2 8.7 - 10.5 mg/dL    Total Protein 7.2 6.0 - 8.4 g/dL    Albumin 3.9 3.5 - 5.2 g/dL    Total Bilirubin 0.5 0.1 - 1.0 mg/dL    Alkaline Phosphatase 83 55 - 135 U/L    AST 15 10 -  40 U/L    ALT 22 10 - 44 U/L    Anion Gap 11 8 - 16 mmol/L    eGFR if African American >60 >60 mL/min/1.73 m^2    eGFR if non African American >60 >60 mL/min/1.73 m^2   CBC auto differential   Result Value Ref Range    WBC 8.27 3.90 - 12.70 K/uL    RBC 5.08 4.60 - 6.20 M/uL    Hemoglobin 16.0 14.0 - 18.0 g/dL    Hematocrit 45.3 40.0 - 54.0 %    MCV 89 82 - 98 fL    MCH 31.5 (H) 27.0 - 31.0 pg    MCHC 35.3 32.0 - 36.0 g/dL    RDW 12.6 11.5 - 14.5 %    Platelets 187 150 - 350 K/uL    MPV 10.2 9.2 - 12.9 fL    Gran # (ANC) 5.8 1.8 - 7.7 K/uL    Lymph # 1.9 1.0 - 4.8 K/uL    Mono # 0.5 0.3 - 1.0 K/uL    Eos # 0.1 0.0 - 0.5 K/uL    Baso # 0.02 0.00 - 0.20 K/uL    Gran% 69.7 38.0 - 73.0 %    Lymph% 23.2 18.0 - 48.0 %    Mono% 5.7 4.0 - 15.0 %    Eosinophil% 1.2 0.0 - 8.0 %    Basophil% 0.2 0.0 - 1.9 %    Differential Method Automated    CSF cell count with differential   Result Value Ref Range    Heme Aliquot 5.0 mL    Appearance, CSF Clear Clear    Color, CSF Colorless Colorless    WBC, CSF 2 0 - 5 /cu mm    RBC, CSF 2 (A) 0 /cu mm   Glucose, CSF   Result Value Ref Range    Glucose, CSF 74 (H) 40 - 70 mg/dL   Protein, CSF   Result Value Ref Range    Protein, CSF 50 (H) 15 - 40 mg/dL   Rapid HIV   Result Value Ref Range    HIV Rapid Testing Negative Negative   Sedimentation rate, manual   Result Value Ref Range    Sed Rate 8 0 - 10 mm/Hr       Significant Imaging:     Imaging Results          FL Lumbar Puncture (xpd) (Final result)  Result time 02/27/18 15:00:15    Final result by Trudy Leger MD (02/27/18 15:00:15)                 Impression:      1. Successful fluoroscopically guided lumbar puncture.      Electronically signed by: TRUDY LEGER MD  Date:     02/27/18  Time:    15:00              Narrative:    Procedure:  Lumbar puncture, diagnostic    Clinical History:     Multiple sclerosis.    Technique/findings: After the risks, benefits and options of the planned procedure were explained to the patient in full  detail, the patient expressed complete understanding and gave signed informed consent. procedure was performed by David Aguiar PA-C under the direct supervision of Dr. VASHTI Leger.  The left paraspinous space was localized with fluoroscopy. The skin was marked with indelible ink.  The skin overlying this area was prepped and draped in the usual sterile fashion. A timeout was performed. 1% lidocaine was used as a local anesthetic, and a 22 gauge needle were used to perform a lumbar puncture at L3-L4.  Blunt cc of clear CSF was removed with an opening pressure of 21 cm of water.  The needle was then removed.  A sterile dressing was applied.  The patient tolerated the procedure well without complication, departing the fluoro suite  in unchanged condition.     Fluoroscopic time was  0.1 minutes  and 1 image was obtained.                            Assessment/Plan:     * MS (multiple sclerosis)    Neurology Consult   Diagnosis with MS   PT and OT evaluate and treat   IV steroids per Neurology   [place] for neck pain   LP complete         Depression    Depression done by nurse   He initially reported suicidal thoughts   Recently diagnosis with MS   consult   Patient has no plan   Paroxetine 20 mg daily, first dose now   Clonazepam 0.5 mg QHS    Monitor             Tobacco abuse      Smoking cessation strongly encouraged          VTE Risk Mitigation         Ordered     Medium Risk of VTE  Once      02/27/18 1331     Place sequential compression device  Until discontinued      02/27/18 1152     Place MATTHEW hose  Until discontinued      02/27/18 1152             Duglas Robertson NP  Department of Hospital Medicine   [facility]

## 2018-02-27 NOTE — PLAN OF CARE
Problem: Patient Care Overview  Goal: Plan of Care Review  Outcome: Ongoing (interventions implemented as appropriate)  Pt AAO x 4   VSS.  Pt remained free of falls this shift.   Pt pain controlled with oral meds this shift.  Plan of care reviewed. Patient verbalizes understanding.    Patient NSR on monitor.   Bed low, side rails up x 2, wheels locked, call light in reach.   Patient instructed to call for assistance.   Hourly rounding completed.   Will continue to monitor.

## 2018-02-27 NOTE — HPI
Mr Haywood is a 47 year old male with PMHx of tobacco use. Patient was seen in the Emergency Room on yesterday as well, however left AMA. He presented to [facility] Emergency Room with numbness and weakness to the left side of body. Associated symptoms include left side neck pain. He has numbness to his left upper extremity as well as his anterior and posterior torso. He does not have numbness in his left lower extremity. Denies associated symptoms shortness of breath or chest pain. MRI done yesterday showed findings most concerning for demyelinating disease and multiple sclerosis. Patient underwent lumbar puncture today as well. He has been evaluated by Neurology today as well.

## 2018-02-27 NOTE — PLAN OF CARE
Consult received for counseling.  CM spoke to primary nurse, Flor.  Patient states that he has depression.  CM met with patient at bedside and provided him with a [facility] from [facility].  He agreed to call when discharged.  He states he is not established with a PCP and declined having CM set up an appointment at this time.  No other needs at this time.     02/27/18 9859   Discharge Assessment   Assessment Type Discharge Planning Assessment   Confirmed/corrected address and phone number on facesheet? Yes   Assessment information obtained from? Patient;Medical Record   Expected Length of Stay (days) (TBD)   Communicated expected length of stay with patient/caregiver yes   Prior to hospitilization cognitive status: Alert/Oriented   Prior to hospitalization functional status: Independent   Current cognitive status: Alert/Oriented   Current Functional Status: Independent   Facility Arrived From: home   Lives With sibling(s)   Able to Return to Prior Arrangements yes   Is patient able to care for self after discharge? Yes   Who are your caregiver(s) and their phone number(s)? Patient is independent.  Rosalio Haywood brother [phone]   Patient's perception of discharge disposition home or selfcare   Readmission Within The Last 30 Days no previous admission in last 30 days   Patient currently being followed by outpatient case management? No   Patient currently receives any other outside agency services? No   Equipment Currently Used at Home none   Do you have any problems affording any of your prescribed medications? TBD   Is the patient taking medications as prescribed? (not on any prescription medications)   Does the patient have transportation home? Yes   Transportation Available family or friend will provide   Dialysis Name and Scheduled days NA   Does the patient receive services at the Coumadin Clinic? (NA)   Discharge Plan A Home with family   Discharge Plan B Home with family   Patient/Family In  Agreement With Plan yes

## 2018-02-27 NOTE — ASSESSMENT & PLAN NOTE
Depression done by nurse   He initially reported suicidal thoughts   Recently diagnosis with MS   consult   No plan   Monitor

## 2018-02-27 NOTE — TELEPHONE ENCOUNTER
----- Message from Doreen Lopez sent at 2/27/2018 10:59 AM CST -----  Contact: Pt  Pt calling in regards to a missed call and would like a callback at .[phone] (home) Pt states that he is in pain

## 2018-02-27 NOTE — ASSESSMENT & PLAN NOTE
Neurology Consult   Diagnosis with MS   PT and OT evaluate and treat   IV steroids per Neurology

## 2018-02-27 NOTE — NURSING
Notified Eitan LIRA verbally of pt's depression eval and responses to suicidal thoughts. Pt has no plan to proceed and states it was just an errant thought as he is having personal stressors in his life and now a new dx of MS.

## 2018-02-27 NOTE — ED PROVIDER NOTES
"SCRIBE #1 NOTE: I, Carlitos Dahl, am scribing for, and in the presence of, Ozzy Paulson MD. I have scribed the entire note.      History      Chief Complaint   Patient presents with    Multiple Sclerosis     pt was seen in ED last night and dx with MS and reports was admitted but left and is hurting all over        Review of patient's allergies indicates:   Allergen Reactions    Cat/feline products     Chocolate flavor Itching        HPI   HPI    2/27/2018, 11:34 AM   History obtained from the patient      History of Present Illness: Logan Haywood is a 47 y.o. male patient who presents to the Emergency Department for numbness which onset gradually 5 days ago. Symptoms are constant and moderate in severity. Pt states he is having numbness on the right side of his body. Pt was seen in the ED last night and was dx with multiple sclerosis. Pt left AMA after being admitted to the hospital. Pt states he left because "he is an idiot and was scared." No mitigating or exacerbating factors reported. Associated sxs include left sided neck pain. Patient denies any fever, chills, n/v/d, HA, lightheadedness, dizziness, weakness, and all other sxs at this time. No further complaints or concerns at this time.         Arrival mode: Personal vehicle      PCP: Primary Doctor No       Past Medical History:  History reviewed. No pertinent past medical history.    Past Surgical History:  Past Surgical History:   Procedure Laterality Date    TONSILLECTOMY           Family History:  History reviewed. No pertinent family history.    Social History:  Social History     Social History Main Topics    Smoking status: Current Every Day Smoker     Packs/day: 0.50     Types: Cigarettes    Smokeless tobacco: Unknown    Alcohol use Yes      Comment: seldom    Drug use: No    Sexual activity: Unknown       ROS   Review of Systems   Constitutional: Negative for chills and fever.   HENT: Negative for sore throat.    Respiratory: " "Negative for shortness of breath.    Cardiovascular: Negative for chest pain.   Gastrointestinal: Negative for diarrhea, nausea and vomiting.   Genitourinary: Negative for dysuria.   Musculoskeletal: Positive for neck pain (left sided). Negative for back pain.   Skin: Negative for rash.   Neurological: Positive for numbness (right sided). Negative for dizziness, facial asymmetry, speech difficulty, weakness, light-headedness and headaches.   Hematological: Does not bruise/bleed easily.       Physical Exam      Initial Vitals [02/27/18 1106]   BP Pulse Resp Temp SpO2   122/86 97 18 98.2 °F (36.8 °C) 96 %      MAP       98          Physical Exam  Nursing Notes and Vital Signs Reviewed.  Constitutional: Patient is in no acute distress. Well-developed and well-nourished.  Head: Atraumatic. Normocephalic.  Eyes: PERRL. EOM intact. Conjunctivae are not pale. No scleral icterus.  ENT: Mucous membranes are moist. Oropharynx is clear and symmetric.    Neck: Supple. Full ROM. No lymphadenopathy.  Cardiovascular: Regular rate. Regular rhythm. No murmurs, rubs, or gallops. Distal pulses are 2+ and symmetric.  Pulmonary/Chest: No respiratory distress. Clear to auscultation bilaterally. No wheezing or rales.  Abdominal: Soft and non-distended.  There is no tenderness.  No rebound, guarding, or rigidity.   Musculoskeletal: Moves all extremities. No obvious deformities. No edema.   Skin: Warm and dry.  Neurological:  Alert, awake, and appropriate.  Normal speech.  No acute focal neurological deficits are appreciated.  Psychiatric: Normal affect. Good eye contact. Appropriate in content.    ED Course    Procedures  ED Vital Signs:  Vitals:    02/27/18 1106 02/27/18 1131   BP: 122/86 114/77   Pulse: 97 84   Resp: 18    Temp: 98.2 °F (36.8 °C)    TempSrc: Oral    SpO2: 96% 98%   Weight: 74.8 kg (165 lb)    Height: 5' 4" (1.626 m)        Abnormal Lab Results:  Labs Reviewed   CBC W/ AUTO DIFFERENTIAL - Abnormal; Notable for the " following:        Result Value    MCH 31.5 (*)     All other components within normal limits   CULTURE, CSF  (INCLUDES STAIN)   DRUG SCREEN PANEL, URINE EMERGENCY   PROTIME-INR   APTT   COMPREHENSIVE METABOLIC PANEL   FELIX PROFILE I (SCREEN)   CSF CELL COUNT WITH DIFFERENTIAL   FREEZE AND HOLD -    GLUCOSE, CSF   LDH, CSF   PROTEIN, CSF   RAPID HIV   RPR   SEDIMENTATION RATE, MANUAL   MYELIN BASIC PROTEIN, CSF   MS PROFILE        All Lab Results:  Results for orders placed or performed during the hospital encounter of 02/27/18   CBC auto differential   Result Value Ref Range    WBC 8.27 3.90 - 12.70 K/uL    RBC 5.08 4.60 - 6.20 M/uL    Hemoglobin 16.0 14.0 - 18.0 g/dL    Hematocrit 45.3 40.0 - 54.0 %    MCV 89 82 - 98 fL    MCH 31.5 (H) 27.0 - 31.0 pg    MCHC 35.3 32.0 - 36.0 g/dL    RDW 12.6 11.5 - 14.5 %    Platelets 187 150 - 350 K/uL    MPV 10.2 9.2 - 12.9 fL    Gran # (ANC) 5.8 1.8 - 7.7 K/uL    Lymph # 1.9 1.0 - 4.8 K/uL    Mono # 0.5 0.3 - 1.0 K/uL    Eos # 0.1 0.0 - 0.5 K/uL    Baso # 0.02 0.00 - 0.20 K/uL    Gran% 69.7 38.0 - 73.0 %    Lymph% 23.2 18.0 - 48.0 %    Mono% 5.7 4.0 - 15.0 %    Eosinophil% 1.2 0.0 - 8.0 %    Basophil% 0.2 0.0 - 1.9 %    Differential Method Automated                   The Emergency Provider reviewed the vital signs and test results, which are outlined above.    ED Discussion     11:40 AM: Discussed case with J CARLOS Bello (Hospital Medicine) who states she will talk to Dr. Baeza then call back.    11:52 AM: Discussed case with J CARLOS Bello (Hospital Medicine). Dr. George agrees with current care and management of pt and accepts admission.   Admitting Service: Hospital medicine   Admitting Physician: Dr. George  Admit to: Telemetry    12:02 AM: Re-evaluated pt. I have discussed test results, shared treatment plan, and the need for admission with patient and family at bedside. Pt and family express understanding at this time and agree with all information. All  questions answered. Pt and family have no further questions or concerns at this time. Pt is ready for admit.          ED Medication(s):  Medications   nicotine 21 mg/24 hr 1 patch (1 patch Transdermal Patch Applied 2/27/18 1206)       New Prescriptions    No medications on file             Medical Decision Making    Medical Decision Making:   Clinical Tests:   Lab Tests: Ordered and Reviewed           Scribe Attestation:   Scribe #1: I performed the above scribed service and the documentation accurately describes the services I performed. I attest to the accuracy of the note.    Attending:   Physician Attestation Statement for Scribe #1: I, Ozzy Paulson MD, personally performed the services described in this documentation, as scribed by Carlitos Dahl, in my presence, and it is both accurate and complete.          Clinical Impression       ICD-10-CM ICD-9-CM   1. MS (multiple sclerosis) G35 340       Disposition:   Disposition: Placed in Observation  Condition: Fair         Ozzy Paulson MD  02/27/18 4600

## 2018-02-27 NOTE — PLAN OF CARE
SW met with patient in ED. Patient lives with brother Dar Angel [phone].  Patient currently independent with ADL's at home.  Patient voiced no d/c concerns.  Patient with recent dx of MS. (educational material needed).  No anticipated needs at present. Case mgt to follow up with d/c needs.     02/27/18 1315   Discharge Assessment   Assessment Type Discharge Planning Assessment   Confirmed/corrected address and phone number on facesheet? Yes   Assessment information obtained from? Patient   Communicated expected length of stay with patient/caregiver no   Current cognitive status: Alert/Oriented   Current Functional Status: Independent   Lives With sibling(s)   Able to Return to Prior Arrangements yes   Is patient able to care for self after discharge? Yes   Who are your caregiver(s) and their phone number(s)? dar angel ([phone]   Patient's perception of discharge disposition home or selfcare   Readmission Within The Last 30 Days no previous admission in last 30 days   Patient currently being followed by outpatient case management? No   Patient currently receives any other outside agency services? No   Equipment Currently Used at Home none   Do you have any problems affording any of your prescribed medications? TBD   Is the patient taking medications as prescribed? (currently medications listed however, patient stated he is currently not on any medications.)   Does the patient have transportation home? Yes   Transportation Available family or friend will provide   Does the patient receive services at the Coumadin Clinic? No   Discharge Plan A Home with family

## 2018-02-27 NOTE — TELEPHONE ENCOUNTER
----- Message from Saba Urias sent at 2/27/2018  9:40 AM CST -----  Contact: Pt  Pt called and stated he needed to speak to the nurse. He stated that he was seen in the ER on 2/26/28 and diagnosed with Multiple Sclerosis. He also stated that he was advised to follow up in one day and needs to be squeezed in to the schedule as soon as possible this week. He can be reached at [phone].    Thanks,  TF

## 2018-02-27 NOTE — CONSULTS
"Inpatient consult to Neurology  Consult performed by: ELÍAS LUTHER  Consult ordered by: YAMIL POPE        Consult Requested By: Bandar George MD  Reason for Consult:  MS    SUBJECTIVE:       HPI:     History of Present Illness: Logan Haywood is a 47 y.o. male patient who presents to the Emergency Department for numbness which onset gradually 5 days ago. Symptoms are constant and moderate in severity. Pt states he is having numbness on the right side of his body. Pt was seen in the ED last night and was dx with multiple sclerosis. Pt left AMA after being admitted to the hospital. Pt states he left because "he is an idiot and was scared." No mitigating or exacerbating factors reported. Associated sxs include left sided neck pain. Patient denies any fever, chills, n/v/d, HA, lightheadedness, dizziness, weakness, and all other sxs at this time. No further complaints or concerns at this time.   He was telling that his balance was not good lately . He feels weakness in his left side. He is feeling electric shock like spasm on the left shoulder and neck.          History reviewed. No pertinent past medical history.  Past Surgical History:   Procedure Laterality Date    TONSILLECTOMY       History reviewed. No pertinent family history.  Social History   Substance Use Topics    Smoking status: Current Every Day Smoker     Packs/day: 0.50     Types: Cigarettes    Smokeless tobacco: Not on file    Alcohol use Yes      Comment: seldom     Review of patient's allergies indicates:   Allergen Reactions    Cat/feline products     Chocolate flavor Itching       Scheduled Meds:   nicotine  1 patch Transdermal Daily         Review of Systems   Constitutional: Negative for fever and weight loss.   HENT: Negative for ear pain, hearing loss and tinnitus.    Eyes: Negative for blurred vision, double vision, photophobia, pain, discharge and redness.   Respiratory: Negative for cough and shortness of breath. "    Cardiovascular: Negative for chest pain, palpitations, claudication and leg swelling.   Gastrointestinal: Negative for abdominal pain, heartburn, nausea and vomiting.   Genitourinary: Negative for dysuria, flank pain, frequency and urgency.   Musculoskeletal: Negative for back pain, falls, joint pain, myalgias and neck pain.   Skin: Negative for itching and rash.   Neurological: Positive for tingling, sensory change and focal weakness. Negative for dizziness, tremors, speech change, seizures, loss of consciousness, weakness and headaches.   Endo/Heme/Allergies: Does not bruise/bleed easily.   Psychiatric/Behavioral: Negative for depression, hallucinations, memory loss and suicidal ideas. The patient is not nervous/anxious and does not have insomnia.        OBJECTIVE:     Vital Signs (Most Recent)  Temp: 98.2 °F (36.8 °C) (02/27/18 1106)  Pulse: 73 (02/27/18 1301)  Resp: 18 (02/27/18 1106)  BP: 102/64 (02/27/18 1301)  SpO2: 95 % (02/27/18 1301)    Physical Exam   Constitutional: He is oriented to person, place, and time. He appears well-developed and well-nourished. No distress.   HENT:   Head: Normocephalic.   Right Ear: External ear normal.   Left Ear: External ear normal.   Mouth/Throat: Oropharynx is clear and moist.   Eyes: Conjunctivae and EOM are normal. Pupils are equal, round, and reactive to light.   Neck: Normal range of motion. Neck supple. No tracheal deviation present. No thyromegaly present.   Cardiovascular: Regular rhythm, normal heart sounds and intact distal pulses.    Pulmonary/Chest: Effort normal and breath sounds normal. No respiratory distress.   Abdominal: Soft. Bowel sounds are normal. There is no tenderness.   Musculoskeletal: He exhibits no edema or tenderness.   Lymphadenopathy:     He has no cervical adenopathy.   Neurological: He is alert and oriented to person, place, and time. He has normal strength and normal reflexes. He displays no tremor and normal reflexes. No cranial nerve  deficit or sensory deficit. He exhibits normal muscle tone. Coordination normal. He displays no Babinski's sign on the right side. He displays no Babinski's sign on the left side.   Reflex Scores:       Tricep reflexes are 2+ on the right side and 2+ on the left side.       Bicep reflexes are 2+ on the right side and 2+ on the left side.       Brachioradialis reflexes are 2+ on the right side and 2+ on the left side.       Patellar reflexes are 2+ on the right side and 2+ on the left side.       Achilles reflexes are 2+ on the right side and 2+ on the left side.  Skin: Skin is warm and dry. No rash noted. He is not diaphoretic. No erythema. No pallor.   Psychiatric: He has a normal mood and affect. His behavior is normal. Judgment and thought content normal.         Strength  Deltoids Triceps Biceps Wrist Extension Wrist Flexion Hand    Upper: R 5/5 5/5 5/5 5/5 5/5 5/5    L 4+/5 4+/5 5/5 4+/5 4+/5 4/5     Iliopsoas Quadriceps Knee  Flexion Tibialis  anterior Gastro- cnemius EHL   Lower: R 5/5 5/5 5/5 5/5 5/5 5/5    L 4+/5 4+/5 5/5 4+/5 4+/5 5/5     Laboratory:  Lab Results   Component Value Date    WBC 8.27 02/27/2018    HGB 16.0 02/27/2018    HCT 45.3 02/27/2018     02/27/2018    ALT 22 02/27/2018    AST 15 02/27/2018     02/27/2018    K 3.6 02/27/2018     02/27/2018    CREATININE 0.9 02/27/2018    BUN 10 02/27/2018    CO2 21 (L) 02/27/2018    INR 1.0 02/27/2018         Diagnostic Results:    Impression          Findings are most concerning for demyelinating disease and multiple sclerosis.  One of the lesions within the subcortical white matter of the left temporal lobe demonstrates abnormal enhancement as may be seen with active demyelination.     MRI of C-spine:  Impression          There are four separate foci of abnormal T2 signal within the cervical cord as discussed above.  The one at the level of C2 demonstrates abnormal enhancement.  These findings are concerning for demyelinating  disease such as multiple sclerosis.    Abnormal enhancement may be seen in active demyelination.     Thoracic MRI;  Impression      There is mild degenerative change as discussed above without significant canal or foraminal stenosis.  No abnormal cord signal.           ASSESSMENT/PLAN:     Primary Diagnoses:  1. He has MS clinically and radiologically . Will start steroid protocol .    Patient Active Problem List   Diagnosis    Numbness and tingling    Tobacco abuse    MS (multiple sclerosis)        Plan:  Recommendation:  [facility] and send  CSF for MS panel  .  PT/OT   Time spent: 60 minutes in face to face discussion concerning diagnosis, prognosis, review of lab and test results, benefits of treatment as well as management of disease, counseling of patient and coordination of care between various health care providers . Greater than half the time spent was used for coordination of care and counseling of patient. This note may have some spelling or wording mistakes which might have been overlooked during proof reading.

## 2018-02-28 LAB
ALBUMIN SERPL BCP-MCNC: 3.7 G/DL
ALP SERPL-CCNC: 78 U/L
ALT SERPL W/O P-5'-P-CCNC: 24 U/L
AMPHET+METHAMPHET UR QL: NEGATIVE
ANA SER QL IF: NORMAL
ANION GAP SERPL CALC-SCNC: 10 MMOL/L
AST SERPL-CCNC: 13 U/L
BARBITURATES UR QL SCN>200 NG/ML: NEGATIVE
BENZODIAZ UR QL SCN>200 NG/ML: NEGATIVE
BILIRUB SERPL-MCNC: 0.3 MG/DL
BUN SERPL-MCNC: 19 MG/DL
BZE UR QL SCN: NORMAL
CALCIUM SERPL-MCNC: 9.2 MG/DL
CANNABINOIDS UR QL SCN: NEGATIVE
CHLORIDE SERPL-SCNC: 107 MMOL/L
CO2 SERPL-SCNC: 18 MMOL/L
CREAT SERPL-MCNC: 0.9 MG/DL
CREAT UR-MCNC: 106.4 MG/DL
EST. GFR  (AFRICAN AMERICAN): >60 ML/MIN/1.73 M^2
EST. GFR  (NON AFRICAN AMERICAN): >60 ML/MIN/1.73 M^2
GLUCOSE SERPL-MCNC: 195 MG/DL
METHADONE UR QL SCN>300 NG/ML: NEGATIVE
OPIATES UR QL SCN: NORMAL
PCP UR QL SCN>25 NG/ML: NEGATIVE
POCT GLUCOSE: 151 MG/DL (ref 70–110)
POCT GLUCOSE: 172 MG/DL (ref 70–110)
POCT GLUCOSE: 192 MG/DL (ref 70–110)
POTASSIUM SERPL-SCNC: 4.6 MMOL/L
PROT SERPL-MCNC: 6.9 G/DL
SODIUM SERPL-SCNC: 135 MMOL/L
TOXICOLOGY INFORMATION: NORMAL
TSH SERPL DL<=0.005 MIU/L-ACNC: 0.69 UIU/ML

## 2018-02-28 PROCEDURE — 96372 THER/PROPH/DIAG INJ SC/IM: CPT

## 2018-02-28 PROCEDURE — S4991 NICOTINE PATCH NONLEGEND: HCPCS | Performed by: PHYSICIAN ASSISTANT

## 2018-02-28 PROCEDURE — 80307 DRUG TEST PRSMV CHEM ANLYZR: CPT

## 2018-02-28 PROCEDURE — 63600175 PHARM REV CODE 636 W HCPCS: Performed by: NURSE PRACTITIONER

## 2018-02-28 PROCEDURE — 99900037 HC PT THERAPY SCREENING (STAT)

## 2018-02-28 PROCEDURE — 36415 COLL VENOUS BLD VENIPUNCTURE: CPT

## 2018-02-28 PROCEDURE — 21400001 HC TELEMETRY ROOM

## 2018-02-28 PROCEDURE — 84443 ASSAY THYROID STIM HORMONE: CPT

## 2018-02-28 PROCEDURE — 25000003 PHARM REV CODE 250: Performed by: PSYCHIATRY & NEUROLOGY

## 2018-02-28 PROCEDURE — 25000003 PHARM REV CODE 250: Performed by: NURSE PRACTITIONER

## 2018-02-28 PROCEDURE — 63600175 PHARM REV CODE 636 W HCPCS: Performed by: PSYCHIATRY & NEUROLOGY

## 2018-02-28 PROCEDURE — 11000001 HC ACUTE MED/SURG PRIVATE ROOM

## 2018-02-28 PROCEDURE — 99900038 HC OT GENERIC THERAPY SCREENING (STAT)

## 2018-02-28 PROCEDURE — 25000003 PHARM REV CODE 250: Performed by: PHYSICIAN ASSISTANT

## 2018-02-28 PROCEDURE — 97802 MEDICAL NUTRITION INDIV IN: CPT

## 2018-02-28 PROCEDURE — 80053 COMPREHEN METABOLIC PANEL: CPT

## 2018-02-28 PROCEDURE — 99233 SBSQ HOSP IP/OBS HIGH 50: CPT | Mod: ,,, | Performed by: PSYCHIATRY & NEUROLOGY

## 2018-02-28 RX ORDER — ENOXAPARIN SODIUM 100 MG/ML
40 INJECTION SUBCUTANEOUS EVERY 24 HOURS
Status: DISCONTINUED | OUTPATIENT
Start: 2018-02-28 | End: 2018-03-04 | Stop reason: HOSPADM

## 2018-02-28 RX ORDER — ALPRAZOLAM 0.5 MG/1
0.5 TABLET ORAL 2 TIMES DAILY PRN
Status: DISCONTINUED | OUTPATIENT
Start: 2018-02-28 | End: 2018-03-01

## 2018-02-28 RX ADMIN — FAMOTIDINE 20 MG: 20 TABLET ORAL at 09:02

## 2018-02-28 RX ADMIN — HYDROCODONE BITARTRATE AND ACETAMINOPHEN 1 TABLET: 10; 325 TABLET ORAL at 12:02

## 2018-02-28 RX ADMIN — DEXTROSE 500 MG: 50 INJECTION, SOLUTION INTRAVENOUS at 08:02

## 2018-02-28 RX ADMIN — POLYETHYLENE GLYCOL 3350 17 G: 17 POWDER, FOR SOLUTION ORAL at 08:02

## 2018-02-28 RX ADMIN — STANDARDIZED SENNA CONCENTRATE AND DOCUSATE SODIUM 1 TABLET: 8.6; 5 TABLET, FILM COATED ORAL at 08:02

## 2018-02-28 RX ADMIN — HYDROCODONE BITARTRATE AND ACETAMINOPHEN 1 TABLET: 10; 325 TABLET ORAL at 06:02

## 2018-02-28 RX ADMIN — ENOXAPARIN SODIUM 40 MG: 100 INJECTION SUBCUTANEOUS at 05:02

## 2018-02-28 RX ADMIN — ERGOCALCIFEROL 50000 UNITS: 1.25 CAPSULE ORAL at 08:02

## 2018-02-28 RX ADMIN — FAMOTIDINE 20 MG: 20 TABLET ORAL at 08:02

## 2018-02-28 RX ADMIN — CLONAZEPAM 0.5 MG: 0.5 TABLET ORAL at 08:02

## 2018-02-28 RX ADMIN — PAROXETINE HYDROCHLORIDE 20 MG: 20 TABLET, FILM COATED ORAL at 08:02

## 2018-02-28 RX ADMIN — NICOTINE 1 PATCH: 21 PATCH, EXTENDED RELEASE TRANSDERMAL at 12:02

## 2018-02-28 RX ADMIN — ALPRAZOLAM 0.5 MG: 0.5 TABLET ORAL at 09:02

## 2018-02-28 RX ADMIN — DEXTROSE 500 MG: 50 INJECTION, SOLUTION INTRAVENOUS at 09:02

## 2018-02-28 RX ADMIN — HYDROCODONE BITARTRATE AND ACETAMINOPHEN 1 TABLET: 10; 325 TABLET ORAL at 05:02

## 2018-02-28 RX ADMIN — STANDARDIZED SENNA CONCENTRATE AND DOCUSATE SODIUM 1 TABLET: 8.6; 5 TABLET, FILM COATED ORAL at 09:02

## 2018-02-28 NOTE — PLAN OF CARE
Problem: Patient Care Overview  Goal: Plan of Care Review  Outcome: Ongoing (interventions implemented as appropriate)  POC reviewed with patient, verbalized understanding. Pt remains free from falls. Fall precautions in place. NS on cardiac monitor. VSS. No other complaints at this time. Call bell w/in reach. Reminded to call for assistance. Pain controlled with prn hydrocodone. And pt had great results from the clonopin. Pt was able to rest and he stated that it helped with his restless leg syndrome

## 2018-02-28 NOTE — PT/OT/SLP PROGRESS
Occupational Therapy      Patient Name:  Logan Haywood   MRN:  [id number].  O.T. EVALUATION INITIATED THROUGH CHART REVIEW, HOWEVER PT NOT IN ROOM , GONE FOR LP.  WILL FOLLOW UP TOMORROW  TO COMPLETE O.T. EVALUATION.                THANK YOU     Nayana Valle, OT  2/28/2018   1116

## 2018-02-28 NOTE — HOSPITAL COURSE
The pt was admitted with left sided weakness. MRI Brain and C-spine confirmed evidence of demyelinating disease. Care discussed with neurology. Pt underwent LP- MS CSF profile also confirmed MS.  Pt placed on high dose IV Steroids. Slow improvement of symptoms- also complaiedn of right upper back pain. PT/OT evaluated pt. Pt requested outpatient therapy. Pt verbalized depression-Denies SI. Pt placed on paxil.   Pt discharged on Prednisone taper,ergocalciferol, calcium carbonate, pepcid, Klonopin as recommended by Neurology. Pt will follow up with neurology in 2 weeks. Pt was extensively counseled on tobacco and cocaine cessation. He verbalized understanding.

## 2018-02-28 NOTE — PROGRESS NOTES
Progress note  Neurology      Neurology follow up:  For:  MS on solumedrol drip.    SUBJECTIVE:      HPI:     He is doing better but still c/o numbness , weakness on the left side. he feels intermittent sharp pain in his left side of the base of the skull.      History reviewed. No pertinent past medical history.  Past Surgical History:   Procedure Laterality Date    TONSILLECTOMY       Family History   Problem Relation Age of Onset    Hypertension Mother     COPD Mother     Heart disease Father     Diabetes Sister     Stroke Brother      Social History   Substance Use Topics    Smoking status: Current Every Day Smoker     Packs/day: 1.00     Types: Cigarettes    Smokeless tobacco: Not on file    Alcohol use Yes      Comment: seldom       Review of patient's allergies indicates:   Allergen Reactions    Cat/feline products     Chocolate flavor Itching      Patient Active Problem List   Diagnosis    Numbness and tingling    Tobacco abuse    MS (multiple sclerosis)    Depression         Scheduled Meds:   ergocalciferol  50,000 Units Oral Q7 Days    famotidine  20 mg Oral BID    methylPREDNISolone (SOLU-Medrol) IVPB (doses > 250 mg)  500 mg Intravenous BID    nicotine  1 patch Transdermal Daily    paroxetine  20 mg Oral Daily    polyethylene glycol  17 g Oral Daily    senna-docusate 8.6-50 mg  1 tablet Oral BID     Continuous Infusions:  PRN Meds:acetaminophen, aluminum-magnesium hydroxide-simethicone, hydrocodone-acetaminophen 10-325mg, methocarbamol, ondansetron, promethazine (PHENERGAN) IVPB      Review of Systems   Constitutional: Negative for fever and weight loss.   HENT: Negative for ear pain, hearing loss and tinnitus.    Eyes: Negative for blurred vision, double vision, photophobia, pain, discharge and redness.   Respiratory: Negative for cough and shortness of breath.    Cardiovascular: Negative for chest pain, palpitations, claudication and leg swelling.   Gastrointestinal: Negative  for abdominal pain, heartburn, nausea and vomiting.   Genitourinary: Negative for dysuria, flank pain, frequency and urgency.   Musculoskeletal: Negative for back pain, falls, joint pain, myalgias and neck pain.   Skin: Negative for itching and rash.   Neurological: Positive for tingling, sensory change, focal weakness and headaches. Negative for dizziness, tremors, speech change, seizures, loss of consciousness and weakness.   Endo/Heme/Allergies: Does not bruise/bleed easily.   Psychiatric/Behavioral: Negative for depression, hallucinations, memory loss and suicidal ideas. The patient is not nervous/anxious and does not have insomnia.            OBJECTIVE:     Vital Signs (Most Recent)  Temp: 97.9 °F (36.6 °C) (02/28/18 1302)  Pulse: 93 (02/28/18 1302)  Resp: 18 (02/28/18 1302)  BP: 123/74 (02/28/18 1302)  SpO2: 98 % (02/28/18 1302)    Vital Signs Range (Last 24H):  Temp:  [97.2 °F (36.2 °C)-98.3 °F (36.8 °C)]   Pulse:  [75-97]   Resp:  [16-18]   BP: (110-124)/(59-86)   SpO2:  [95 %-98 %]     Physical Exam   Constitutional: He is oriented to person, place, and time. He appears well-developed and well-nourished. No distress.   HENT:   Head: Normocephalic.   Right Ear: External ear normal.   Left Ear: External ear normal.   Mouth/Throat: Oropharynx is clear and moist.   Eyes: Conjunctivae and EOM are normal. Pupils are equal, round, and reactive to light.   Neck: Normal range of motion. Neck supple. No tracheal deviation present. No thyromegaly present.   Cardiovascular: Regular rhythm, normal heart sounds and intact distal pulses.    Pulmonary/Chest: Effort normal and breath sounds normal. No respiratory distress.   Abdominal: Soft. Bowel sounds are normal. There is no tenderness.   Musculoskeletal: He exhibits no edema or tenderness.   Lymphadenopathy:     He has no cervical adenopathy.   Neurological: He is alert and oriented to person, place, and time. He has normal reflexes. He displays no tremor and normal  reflexes. No cranial nerve deficit or sensory deficit. He exhibits normal muscle tone. Coordination normal. He displays no Babinski's sign on the right side. He displays no Babinski's sign on the left side.   Reflex Scores:       Tricep reflexes are 2+ on the right side and 2+ on the left side.       Bicep reflexes are 2+ on the right side and 2+ on the left side.       Brachioradialis reflexes are 2+ on the right side and 2+ on the left side.       Patellar reflexes are 2+ on the right side and 2+ on the left side.       Achilles reflexes are 2+ on the right side and 2+ on the left side.  Skin: Skin is warm and dry. No rash noted. He is not diaphoretic. No erythema. No pallor.   Psychiatric: He has a normal mood and affect. His behavior is normal. Judgment and thought content normal.        Strength   Deltoids Triceps Biceps Wrist Extension Wrist Flexion Hand    Upper: R 5/5 5/5 5/5 5/5 5/5 5/5     L 4+/5 4+/5 5/5 4+/5 4+/5 4/5       Iliopsoas Quadriceps Knee  Flexion Tibialis  anterior Gastro- cnemius EHL   Lower: R 5/5 5/5 5/5 5/5 5/5 5/5     L 4+/5 4+/5 5/5 4+/5 4+/5 5/5       Laboratory:  Lab Results   Component Value Date    WBC 8.27 02/27/2018    HGB 16.0 02/27/2018    HCT 45.3 02/27/2018     02/27/2018    ALT 24 02/28/2018    AST 13 02/28/2018     (L) 02/28/2018    K 4.6 02/28/2018     02/28/2018    CREATININE 0.9 02/28/2018    BUN 19 02/28/2018    CO2 18 (L) 02/28/2018    TSH 0.686 02/28/2018    INR 1.0 02/27/2018     [facility] report:    Results for RODGER NGUYEN (MRN [id number]) as of 2/28/2018 14:35   Ref. Range 2/27/2018 14:53   Color, CSF Latest Ref Range: Colorless  Colorless   Heme Aliquot Latest Units: mL 5.0   Appearance, CSF Latest Ref Range: Clear  Clear   WBC, CSF Latest Ref Range: 0 - 5 /cu mm 2   RBC, CSF Latest Ref Range: 0 /cu mm 2 (A)   Glucose, CSF Latest Ref Range: 40 - 70 mg/dL 74 (H)   Protein, CSF Latest Ref Range: 15 - 40 mg/dL 50 (H)   Body Fluid Source,  Refrigerated Unknown csf       Diagnostic Results:    MRI of the brain:   Impression           Findings are most concerning for demyelinating disease and multiple sclerosis.  One of the lesions within the subcortical white matter of the left temporal lobe demonstrates abnormal enhancement as may be seen with active demyelination.      MRI of C-spine:  Impression           There are four separate foci of abnormal T2 signal within the cervical cord as discussed above.  The one at the level of C2 demonstrates abnormal enhancement.  These findings are concerning for demyelinating disease such as multiple sclerosis.    Abnormal enhancement may be seen in active demyelination.      Thoracic MRI;  Impression       There is mild degenerative change as discussed above without significant canal or foraminal stenosis.  No abnormal cord signal.               ASSESSMENT/PLAN:     Assessment:   Multiple sclerosis , clinically and radiologically on steroid iv. He is tolerating the medication.  We should finish 10 doses in 5 days.    Patient Active Problem List   Diagnosis    Numbness and tingling    Tobacco abuse    MS (multiple sclerosis)    Depression         Plan:  Cont. Same medication as prescribed.  Time spent: 30 minutes in face to face discussion concerning diagnosis, prognosis, review of lab and test results, benefits of treatment as well as management of disease, counseling of patient and coordination of care between various health care providers . Greater than half the time spent was used for coordination of care and counseling of patient. This note may have some spelling or wording mistakes which might have been overlooked during proof reading.

## 2018-02-28 NOTE — PT/OT/SLP PROGRESS
Physical Therapy      Patient Name:  Logan Haywood   MRN:  [id number]    P.T. EVALUATION INITIATED THROUGH CHART REVIEW, HOWEVER PT NOT IN ROOM , GONE FOR LP.  WILL FOLLOW UP TOMORROW  TO COMPLETE P.T. EVALUATION.                THANK YOU     Caitlin Baeza, PT                           02/28/18                                [id number]

## 2018-02-28 NOTE — PLAN OF CARE
Problem: Patient Care Overview  Goal: Plan of Care Review  Outcome: Ongoing (interventions implemented as appropriate)  Recommendation/Intervention: 1. Discussed importance of adequate intake for maintaing muslce mass and strength.    2. Patient to seek out assistance ([employer], [employer], etc)  Goals: Intake of 100% of 3 meals daily  Nutrition Goal Status: new

## 2018-02-28 NOTE — ASSESSMENT & PLAN NOTE
Neurology input appreciated.   MRI consistent with MS  PT and OT evaluate and treat   IV steroids per Neurology

## 2018-02-28 NOTE — PROGRESS NOTES
[facility] Hospital Medicine  Progress Note    Patient Name: Logan Haywood  MRN: [id number]  Patient Class: IP- Inpatient   Admission Date: 2/27/2018  Length of Stay: 1 days  Attending Physician: Bandar George MD  Primary Care Provider: Primary Doctor No    Subjective:     Principal Problem:MS (multiple sclerosis)    HPI:  Mr Haywood is a 47 year old male with PMHx of tobacco use. Patient was seen in the Emergency Room on yesterday as well, however left AMA. He presented to [facility] Emergency Room with numbness and weakness to the left side of body. Associated symptoms include left side neck pain. He has numbness  to his left upper extremity as well as his anterior and posterior torso. He does not have numbness in his left lower extremity. Denies associated symptoms shortness of breath or chest pain. MRI done yesterday showed findings most concerning for demyelinating disease and multiple sclerosis. Patient underwent lumbar puncture today as well. He has been evaluated by Neurology today as well.     Hospital Course:  12/28/18: 47 year old male newly diagnosed with MS. MRI Brain and Cervical spine confirmed evidence of demyelinating disease. He underwent LP and MS profile pending. He is currently receiving steroid protocol.     Interval History: Day 2 of steroid therapy. +numbness. Was started on Paxil for depression.     Review of Systems   Constitutional: Positive for fatigue. Negative for appetite change, chills, diaphoresis and fever.   HENT: Negative for congestion, ear pain, mouth sores, sore throat and trouble swallowing.    Eyes: Negative for pain and visual disturbance.   Respiratory: Negative for cough, chest tightness and shortness of breath.    Cardiovascular: Negative for chest pain, palpitations and leg swelling.   Gastrointestinal: Negative for abdominal pain, constipation, diarrhea and nausea.   Endocrine: Negative for cold intolerance, heat intolerance, polydipsia and polyuria.    Genitourinary: Negative for dysuria, frequency and hematuria.   Musculoskeletal: Negative for arthralgias, back pain, myalgias and neck pain.   Skin: Negative for pallor, rash and wound.   Allergic/Immunologic: Negative for environmental allergies and immunocompromised state.   Neurological: Positive for numbness (left torso and upper extremity). Negative for dizziness, seizures, syncope, weakness and headaches.   Hematological: Negative for adenopathy. Does not bruise/bleed easily.   Psychiatric/Behavioral: Negative for agitation, confusion and sleep disturbance.     Objective:     Vital Signs (Most Recent):  Temp: 97.2 °F (36.2 °C) (02/28/18 0803)  Pulse: 95 (02/28/18 0901)  Resp: 16 (02/28/18 0803)  BP: (!) 110/59 (02/28/18 0803)  SpO2: 95 % (02/28/18 0803) Vital Signs (24h Range):  Temp:  [97.2 °F (36.2 °C)-98.3 °F (36.8 °C)] 97.2 °F (36.2 °C)  Pulse:  [73-97] 95  Resp:  [16-18] 16  SpO2:  [95 %-98 %] 95 %  BP: (102-124)/(59-86) 110/59     Weight: 74 kg (163 lb 2.3 oz)  Body mass index is 28 kg/m².    Intake/Output Summary (Last 24 hours) at 02/28/18 1037  Last data filed at 02/28/18 0400   Gross per 24 hour   Intake              100 ml   Output                0 ml   Net              100 ml      Physical Exam   Constitutional: He is oriented to person, place, and time. He appears well-developed and well-nourished.   HENT:   Head: Normocephalic and atraumatic.   Eyes: Conjunctivae are normal.   Neck: Neck supple. No JVD present.   Cardiovascular: Normal rate, regular rhythm and normal heart sounds.    Pulmonary/Chest: Effort normal and breath sounds normal. He has no wheezes.   Abdominal: Soft. Bowel sounds are normal. He exhibits no distension. There is no tenderness.   Musculoskeletal: Normal range of motion.   Neurological: He is alert and oriented to person, place, and time.   4/5 strength to left upper and lower extremities. 5/5 strength in all other extremities.    Skin: Skin is warm and dry. No rash  noted.   Psychiatric: He has a normal mood and affect. Thought content normal. He is not agitated.   Nursing note and vitals reviewed.    Significant Labs:   CBC:   Recent Labs  Lab 02/26/18  1120 02/27/18  1205   WBC 6.14 8.27   HGB 15.5 16.0   HCT 44.7 45.3    187     CMP:   Recent Labs  Lab 02/27/18  1205 02/27/18  1634 02/28/18  0443    142 135*   K 3.6 3.8 4.6    107 107   CO2 21* 24 18*    96 195*   BUN 10 10 19   CREATININE 0.9 0.8 0.9   CALCIUM 9.2 8.9 9.2   PROT 7.2 7.1 6.9   ALBUMIN 3.9 3.9 3.7   BILITOT 0.5 0.5 0.3   ALKPHOS 83 84 78   AST 15 15 13   ALT 22 23 24   ANIONGAP 11 11 10   EGFRNONAA >60 >60 >60       Significant Imaging:   Imaging Results          FL Lumbar Puncture (xpd) (Final result)  Result time 02/27/18 15:00:15    Final result by Trudy Legre MD (02/27/18 15:00:15)                 Impression:      1. Successful fluoroscopically guided lumbar puncture.      Electronically signed by: TRUDY LEGER MD  Date:     02/27/18  Time:    15:00              Narrative:    Procedure:  Lumbar puncture, diagnostic    Clinical History:     Multiple sclerosis.    Technique/findings: After the risks, benefits and options of the planned procedure were explained to the patient in full detail, the patient expressed complete understanding and gave signed informed consent. procedure was performed by David Aguiar PA-C under the direct supervision of Dr. VASHTI Leger.  The left paraspinous space was localized with fluoroscopy. The skin was marked with indelible ink.  The skin overlying this area was prepped and draped in the usual sterile fashion. A timeout was performed. 1% lidocaine was used as a local anesthetic, and a 22 gauge needle were used to perform a lumbar puncture at L3-L4.  Blunt cc of clear CSF was removed with an opening pressure of 21 cm of water.  The needle was then removed.  A sterile dressing was applied.  The patient tolerated the procedure well without  complication, departing the fluoro suite  in unchanged condition.     Fluoroscopic time was  0.1 minutes  and 1 image was obtained.                                Assessment/Plan:      * MS (multiple sclerosis)    Neurology input appreciated.   MRI consistent with MS  PT and OT evaluate and treat   IV steroids per Neurology             Depression    He admits to hopelessness and wishing to die. Denies suicide plan or intent to hurt himself or others.   Recently diagnosis with MS   consult   No plan   Monitor   -Start Paxil            Tobacco abuse      Smoking cessation strongly encouraged          VTE Risk Mitigation         Ordered     Medium Risk of VTE  Once      02/27/18 1331     Place sequential compression device  Until discontinued      02/27/18 1152     Place MATTHEW hose  Until discontinued      02/27/18 1152        Yocasta Osullivan NP  Department of Hospital Medicine   [facility]

## 2018-02-28 NOTE — ASSESSMENT & PLAN NOTE
He admits to hopelessness and wishing to die. Denies suicide plan or intent to hurt himself or others.   Recently diagnosis with MS   consult   No plan   Monitor   -Start Paxil
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
## 2018-02-28 NOTE — SUBJECTIVE & OBJECTIVE
Interval History: Day 2 of steroid therapy. +numbness. Was started on Paxil for depression.     Review of Systems   Constitutional: Positive for fatigue. Negative for appetite change, chills, diaphoresis and fever.   HENT: Negative for congestion, ear pain, mouth sores, sore throat and trouble swallowing.    Eyes: Negative for pain and visual disturbance.   Respiratory: Negative for cough, chest tightness and shortness of breath.    Cardiovascular: Negative for chest pain, palpitations and leg swelling.   Gastrointestinal: Negative for abdominal pain, constipation, diarrhea and nausea.   Endocrine: Negative for cold intolerance, heat intolerance, polydipsia and polyuria.   Genitourinary: Negative for dysuria, frequency and hematuria.   Musculoskeletal: Negative for arthralgias, back pain, myalgias and neck pain.   Skin: Negative for pallor, rash and wound.   Allergic/Immunologic: Negative for environmental allergies and immunocompromised state.   Neurological: Positive for numbness (left torso and upper extremity). Negative for dizziness, seizures, syncope, weakness and headaches.   Hematological: Negative for adenopathy. Does not bruise/bleed easily.   Psychiatric/Behavioral: Negative for agitation, confusion and sleep disturbance.     Objective:     Vital Signs (Most Recent):  Temp: 97.2 °F (36.2 °C) (02/28/18 0803)  Pulse: 95 (02/28/18 0901)  Resp: 16 (02/28/18 0803)  BP: (!) 110/59 (02/28/18 0803)  SpO2: 95 % (02/28/18 0803) Vital Signs (24h Range):  Temp:  [97.2 °F (36.2 °C)-98.3 °F (36.8 °C)] 97.2 °F (36.2 °C)  Pulse:  [73-97] 95  Resp:  [16-18] 16  SpO2:  [95 %-98 %] 95 %  BP: (102-124)/(59-86) 110/59     Weight: 74 kg (163 lb 2.3 oz)  Body mass index is 28 kg/m².    Intake/Output Summary (Last 24 hours) at 02/28/18 1037  Last data filed at 02/28/18 0400   Gross per 24 hour   Intake              100 ml   Output                0 ml   Net              100 ml      Physical Exam   Constitutional: He is oriented  to person, place, and time. He appears well-developed and well-nourished.   HENT:   Head: Normocephalic and atraumatic.   Eyes: Conjunctivae are normal.   Neck: Neck supple. No JVD present.   Cardiovascular: Normal rate, regular rhythm and normal heart sounds.    Pulmonary/Chest: Effort normal and breath sounds normal. He has no wheezes.   Abdominal: Soft. Bowel sounds are normal. He exhibits no distension. There is no tenderness.   Musculoskeletal: Normal range of motion.   Neurological: He is alert and oriented to person, place, and time.   4/5 strength to left upper and lower extremities. 5/5 strength in all other extremities.    Skin: Skin is warm and dry. No rash noted.   Psychiatric: He has a normal mood and affect. Thought content normal. He is not agitated.   Nursing note and vitals reviewed.    Significant Labs:   CBC:   Recent Labs  Lab 02/26/18  1120 02/27/18  1205   WBC 6.14 8.27   HGB 15.5 16.0   HCT 44.7 45.3    187     CMP:   Recent Labs  Lab 02/27/18  1205 02/27/18  1634 02/28/18  0443    142 135*   K 3.6 3.8 4.6    107 107   CO2 21* 24 18*    96 195*   BUN 10 10 19   CREATININE 0.9 0.8 0.9   CALCIUM 9.2 8.9 9.2   PROT 7.2 7.1 6.9   ALBUMIN 3.9 3.9 3.7   BILITOT 0.5 0.5 0.3   ALKPHOS 83 84 78   AST 15 15 13   ALT 22 23 24   ANIONGAP 11 11 10   EGFRNONAA >60 >60 >60       Significant Imaging:   Imaging Results          FL Lumbar Puncture (xpd) (Final result)  Result time 02/27/18 15:00:15    Final result by Trudy Leger MD (02/27/18 15:00:15)                 Impression:      1. Successful fluoroscopically guided lumbar puncture.      Electronically signed by: TRUDY LEGER MD  Date:     02/27/18  Time:    15:00              Narrative:    Procedure:  Lumbar puncture, diagnostic    Clinical History:     Multiple sclerosis.    Technique/findings: After the risks, benefits and options of the planned procedure were explained to the patient in full detail, the patient expressed  complete understanding and gave signed informed consent. procedure was performed by David Aguiar PA-C under the direct supervision of Dr. VASHTI Leger.  The left paraspinous space was localized with fluoroscopy. The skin was marked with indelible ink.  The skin overlying this area was prepped and draped in the usual sterile fashion. A timeout was performed. 1% lidocaine was used as a local anesthetic, and a 22 gauge needle were used to perform a lumbar puncture at L3-L4.  Blunt cc of clear CSF was removed with an opening pressure of 21 cm of water.  The needle was then removed.  A sterile dressing was applied.  The patient tolerated the procedure well without complication, departing the fluoro suite  in unchanged condition.     Fluoroscopic time was  0.1 minutes  and 1 image was obtained.

## 2018-03-01 LAB
ALBUMIN SERPL BCP-MCNC: 3.8 G/DL
ALP SERPL-CCNC: 77 U/L
ALT SERPL W/O P-5'-P-CCNC: 23 U/L
ANION GAP SERPL CALC-SCNC: 12 MMOL/L
AST SERPL-CCNC: 12 U/L
BILIRUB SERPL-MCNC: 0.3 MG/DL
BUN SERPL-MCNC: 13 MG/DL
CALCIUM SERPL-MCNC: 9.3 MG/DL
CHLORIDE SERPL-SCNC: 107 MMOL/L
CO2 SERPL-SCNC: 21 MMOL/L
CREAT SERPL-MCNC: 0.8 MG/DL
EST. GFR  (AFRICAN AMERICAN): >60 ML/MIN/1.73 M^2
EST. GFR  (NON AFRICAN AMERICAN): >60 ML/MIN/1.73 M^2
GLUCOSE SERPL-MCNC: 151 MG/DL
LDH FLD L TO P-CCNC: 26 U/L
POCT GLUCOSE: 139 MG/DL (ref 70–110)
POCT GLUCOSE: 145 MG/DL (ref 70–110)
POCT GLUCOSE: 191 MG/DL (ref 70–110)
POTASSIUM SERPL-SCNC: 4.4 MMOL/L
PROT SERPL-MCNC: 7 G/DL
SODIUM SERPL-SCNC: 140 MMOL/L
SPECIMEN SOURCE: NORMAL

## 2018-03-01 PROCEDURE — 25000003 PHARM REV CODE 250: Performed by: NURSE PRACTITIONER

## 2018-03-01 PROCEDURE — 97165 OT EVAL LOW COMPLEX 30 MIN: CPT

## 2018-03-01 PROCEDURE — 99499 UNLISTED E&M SERVICE: CPT | Mod: ,,, | Performed by: PSYCHIATRY & NEUROLOGY

## 2018-03-01 PROCEDURE — G8988 SELF CARE GOAL STATUS: HCPCS | Mod: CJ

## 2018-03-01 PROCEDURE — 97116 GAIT TRAINING THERAPY: CPT

## 2018-03-01 PROCEDURE — G8980 MOBILITY D/C STATUS: HCPCS | Mod: CI

## 2018-03-01 PROCEDURE — 21400001 HC TELEMETRY ROOM

## 2018-03-01 PROCEDURE — 25000003 PHARM REV CODE 250: Performed by: PSYCHIATRY & NEUROLOGY

## 2018-03-01 PROCEDURE — G8979 MOBILITY GOAL STATUS: HCPCS | Mod: CJ

## 2018-03-01 PROCEDURE — G8978 MOBILITY CURRENT STATUS: HCPCS | Mod: CK

## 2018-03-01 PROCEDURE — 63600175 PHARM REV CODE 636 W HCPCS: Performed by: NURSE PRACTITIONER

## 2018-03-01 PROCEDURE — 80053 COMPREHEN METABOLIC PANEL: CPT

## 2018-03-01 PROCEDURE — 97535 SELF CARE MNGMENT TRAINING: CPT

## 2018-03-01 PROCEDURE — 97162 PT EVAL MOD COMPLEX 30 MIN: CPT

## 2018-03-01 PROCEDURE — 63600175 PHARM REV CODE 636 W HCPCS: Performed by: PSYCHIATRY & NEUROLOGY

## 2018-03-01 PROCEDURE — 96372 THER/PROPH/DIAG INJ SC/IM: CPT

## 2018-03-01 PROCEDURE — 36415 COLL VENOUS BLD VENIPUNCTURE: CPT

## 2018-03-01 PROCEDURE — S4991 NICOTINE PATCH NONLEGEND: HCPCS | Performed by: PHYSICIAN ASSISTANT

## 2018-03-01 PROCEDURE — 11000001 HC ACUTE MED/SURG PRIVATE ROOM

## 2018-03-01 PROCEDURE — 25000003 PHARM REV CODE 250: Performed by: PHYSICIAN ASSISTANT

## 2018-03-01 PROCEDURE — G8987 SELF CARE CURRENT STATUS: HCPCS | Mod: CK

## 2018-03-01 RX ORDER — CLONAZEPAM 0.5 MG/1
0.5 TABLET ORAL 2 TIMES DAILY
Status: DISCONTINUED | OUTPATIENT
Start: 2018-03-01 | End: 2018-03-04 | Stop reason: HOSPADM

## 2018-03-01 RX ADMIN — HYDROCODONE BITARTRATE AND ACETAMINOPHEN 1 TABLET: 10; 325 TABLET ORAL at 10:03

## 2018-03-01 RX ADMIN — POLYETHYLENE GLYCOL 3350 17 G: 17 POWDER, FOR SOLUTION ORAL at 09:03

## 2018-03-01 RX ADMIN — NICOTINE 1 PATCH: 21 PATCH, EXTENDED RELEASE TRANSDERMAL at 11:03

## 2018-03-01 RX ADMIN — ENOXAPARIN SODIUM 40 MG: 100 INJECTION SUBCUTANEOUS at 04:03

## 2018-03-01 RX ADMIN — FAMOTIDINE 20 MG: 20 TABLET ORAL at 09:03

## 2018-03-01 RX ADMIN — HYDROCODONE BITARTRATE AND ACETAMINOPHEN 1 TABLET: 10; 325 TABLET ORAL at 04:03

## 2018-03-01 RX ADMIN — STANDARDIZED SENNA CONCENTRATE AND DOCUSATE SODIUM 1 TABLET: 8.6; 5 TABLET, FILM COATED ORAL at 09:03

## 2018-03-01 RX ADMIN — DEXTROSE 500 MG: 50 INJECTION, SOLUTION INTRAVENOUS at 09:03

## 2018-03-01 RX ADMIN — HYDROCODONE BITARTRATE AND ACETAMINOPHEN 1 TABLET: 10; 325 TABLET ORAL at 11:03

## 2018-03-01 RX ADMIN — CLONAZEPAM 0.5 MG: 0.5 TABLET ORAL at 10:03

## 2018-03-01 RX ADMIN — CLONAZEPAM 0.5 MG: 0.5 TABLET ORAL at 09:03

## 2018-03-01 RX ADMIN — PAROXETINE HYDROCHLORIDE 20 MG: 20 TABLET, FILM COATED ORAL at 09:03

## 2018-03-01 NOTE — PT/OT/SLP EVAL
Physical Therapy Evaluation    Patient Name:  Logan Haywood   MRN:  [id number]    Recommendations:     Discharge Recommendations:  outpatient PT   Discharge Equipment Recommendations: none   Barriers to discharge: None    Assessment:     Logan Haywood is a 47 y.o. male admitted with a medical diagnosis of MS (multiple sclerosis).  He presents with the following impairments/functional limitations:  weakness, impaired endurance, gait instability, impaired functional mobilty, impaired balance, decreased upper extremity function, decreased lower extremity function, impaired coordination, abnormal tone .    Rehab Prognosis:  GOOD; patient would benefit from acute skilled PT services to address these deficits and reach maximum level of function.      Recent Surgery: * No surgery found *      Plan:     During this hospitalization, patient to be seen 5 x/week to address the above listed problems via gait training, therapeutic activities, therapeutic exercises  · Plan of Care Expires:  03/08/18   Plan of Care Reviewed with:      Subjective     Communicated with YAQUELIN AND NURSE WILLSON prior to session.  Patient found AT BEDSIDE,  upon PT entry to room, agreeable to evaluation.      Chief Complaint: NOT BEING TO USE L HAND WELL  Patient comments/goals: TO BE BACK TO NORMAL  Pain/Comfort:  · Pain Rating 1: 0/10    Patients cultural, spiritual, Baptism conflicts given the current situation:      Living Environment:  PT LIVES WITH HIS BROTHER, IN A SINGLE BLU HOME, NO STEPS TO ENTER  Prior to admission, patients level of function was INDEPENDENT, WORKING AS , AND DRIVING.  Patient has the following equipment: none.  DME owned (not currently used): none.  Upon discharge, patient will have assistance from FAMILY.    Objective:     Patient found with: telemetry     General Precautions: Standard, fall   Orthopedic Precautions:N/A   Braces: N/A     Exams:  · Cognitive Exam:  Patient is oriented to  Person, Place, Time and Situation and follows 100% of COMPLEX commands   · Gross Motor Coordination:  IMPAIRED IN LUE AND LLE  · Postural Exam:  Patient presented with the following abnormalities:    · -       Rounded shoulders  · -       Forward head  · Sensation:    · -       Impaired  light/touch LUE/LLE and stereognosis L HAND  · RLE ROM: WFL  · RLE Strength: WFL  · LLE ROM: WFL  · LLE Strength: Deficits: -3/5, UNABLE TO FULLY EXTEND KNEE, OR RAISE HIP OR FOOT    Functional Mobility:  · Bed Mobility:     · Supine to Sit: modified independence  · Sit to Supine: modified independence  · Transfers:     · Sit to Stand:  contact guard assistance with no AD  · Bed to Chair: contact guard assistance with  no AD  using  Step Transfer  · Gait: AMBULATED IN HALLWAY, 2 X 50 FEET, DECREASED L FOOT CLEARANCE, DECREASED NADJA  · Balance: FAIR+ IN SITTING,  FAIR IN STANDING, LOB CORRECTED WITH MEL    AM-PAC 6 CLICK MOBILITY  Total Score:17       Therapeutic Activities and Exercises:   PT EDUCATION, ROLE OF P.T. AND REHAB PROCESS.  INSTRUCTED IN SAFETY AND COURSE OF THERAPY, NEED FOR OPD THERAPY.  GAIT IM HALLWAY    Patient left SEATED ON SOFA FOR BREAKFAST with all lines intact, call button in reach and NURSE notified.    GOALS:    Physical Therapy Goals        Problem: Physical Therapy Goal    Goal Priority Disciplines Outcome Goal Variances Interventions   Physical Therapy Goal     PT/OT, PT      Description:  Goals to be met by: 18     Patient will increase functional independence with mobility by performin. Sit to stand transfer with Supervision  2. Bed to chair transfer with Supervision, without LOB  3. Gait  2 x 150 feet with Supervision normal foot clearance on L, no LOB  4. Lower extremity exercise program x20 reps per handout, with supervision  5. Complete precision/repetition exercises with 80% accuracy                      History:     History reviewed. No pertinent past medical history.    Past  Surgical History:   Procedure Laterality Date    TONSILLECTOMY         Clinical Decision Making:     History  Co-morbidities and personal factors that may impact the plan of care Examination  Body Structures and Functions, activity limitations and participation restrictions that may impact the plan of care Clinical Presentation   Decision Making/ Complexity Score   Co-morbidities:   [] Time since onset of injury / illness / exacerbation  [] Status of current condition  []Patient's cognitive status and safety concerns    [] Multiple Medical Problems (see med hx)  Personal Factors:   [] Patient's age  [] Prior Level of function   [] Patient's home situation (environment and family support)  [] Patient's level of motivation  [] Expected progression of patient      HISTORY:(criteria)    [] 42231 - no personal factors/history    [] 97161 - has 1-2 personal factor/comorbidity     [] 09886 - has >3 personal factor/comorbidity     Body Regions:  [] Objective examination findings  [] Head     []  Neck  [] Trunk   [] Upper Extremity  [] Lower Extremity    Body Systems:  [] For communication ability, affect, cognition, language, and learning style: the assessment of the ability to make needs known, consciousness, orientation (person, place, and time), expected emotional /behavioral responses, and learning preferences (eg, learning barriers, education  needs)  [] For the neuromuscular system: a general assessment of gross coordinated movement (eg, balance, gait, locomotion, transfers, and transitions) and motor function  (motor control and motor learning)  [] For the musculoskeletal system: the assessment of gross symmetry, gross range of motion, gross strength, height, and weight  [] For the integumentary system: the assessment of pliability(texture), presence of scar formation, skin color, and skin integrity  [] For cardiovascular/pulmonary system: the assessment of heart rate, respiratory rate, blood pressure, and edema      Activity limitations:    [] Patient's cognitive status and saf ety concerns          [] Status of current condition      [] Weight bearing restriction  [] Cardiopulmunary Restriction    Participation Restrictions:   [x] Goals and goal agreement with the patient     [x] Rehab potential (prognosis) and probable outcome      Examination of Body System: (criteria)    [] 41210 - addressing 1-2 elements    [x] 81052 - addressing a total of 3 or more elements     [] 16083 -  Addressing a total of 4 or more elements         Clinical Presentation: (criteria)  Unstable - 63413     On examination of body system using standardized tests and measures patient presents with 3 or more elements from any of the following: body structures and functions, activity limitations, and/or participation restrictions.  Leading to a clinical presentation that is considered evolving with changing characteristics                              Clinical Decision Making  (Eval Complexity):  Moderate - 48045     Time Tracking:     PT Received On: 03/01/18  PT Start Time: 0810     PT Stop Time: 0840  PT Total Time (min): 30 min     Billable Minutes: Evaluation 15 and Gait Training 15      Caitlin Baeza PT  03/01/2018

## 2018-03-01 NOTE — PROGRESS NOTES
He is sleeping and nurse reported that he did not sleep last night.   So, examination is not done. Will follow.

## 2018-03-01 NOTE — PLAN OF CARE
Problem: Patient Care Overview  Goal: Plan of Care Review  Outcome: Ongoing (interventions implemented as appropriate)  Remains free from harm, no c/o pain, still c/o numbness and tingling to L hand, will continue to monitor. 24 hour chart check complete.

## 2018-03-01 NOTE — PLAN OF CARE
Problem: Patient Care Overview  Goal: Plan of Care Review  Outcome: Ongoing (interventions implemented as appropriate)  Patient transferred from observations today. Pain controlled. Numbness and tingling noted to the left hand. Free from falls and injuries. 12 hour chart check completed.

## 2018-03-01 NOTE — PROGRESS NOTES
[facility] Hospital Medicine  Progress Note    Patient Name: Logan Haywood  MRN: [id number]  Patient Class: IP- Inpatient   Admission Date: 2/27/2018  Length of Stay: 2 days  Attending Physician: Brayden Stover MD  Primary Care Provider: Primary Doctor No    Subjective:     Principal Problem:MS (multiple sclerosis)    HPI:  Mr Haywood is a 47 year old male with PMHx of tobacco use. Patient was seen in the Emergency Room on yesterday as well, however left AMA. He presented to [facility] Emergency Room with numbness and weakness to the left side of body. Associated symptoms include left side neck pain. He has numbness  to his left upper extremity as well as his anterior and posterior torso. He does not have numbness in his left lower extremity. Denies associated symptoms shortness of breath or chest pain. MRI done yesterday showed findings most concerning for demyelinating disease and multiple sclerosis. Patient underwent lumbar puncture today as well. He has been evaluated by Neurology today as well.     Hospital Course:  12/28/18: 47 year old male newly diagnosed with MS. MRI Brain and Cervical spine confirmed evidence of demyelinating disease. He underwent LP and MS profile pending. He is currently receiving steroid protocol.     3/1/18: doing well, still complaining of numbness. Steroid induce hyperglycemia noted.     Interval History:no acute events overnight. PT/OT recommends outpatient therapy.     Review of Systems   Constitutional: Positive for fatigue. Negative for appetite change, chills, diaphoresis and fever.   HENT: Negative for congestion, ear pain, mouth sores, sore throat and trouble swallowing.    Eyes: Negative for pain and visual disturbance.   Respiratory: Negative for cough, chest tightness and shortness of breath.    Cardiovascular: Negative for chest pain, palpitations and leg swelling.   Gastrointestinal: Negative for abdominal pain, constipation, diarrhea and nausea.    Endocrine: Negative for cold intolerance, heat intolerance, polydipsia and polyuria.   Genitourinary: Negative for dysuria, frequency and hematuria.   Musculoskeletal: Negative for arthralgias, back pain, myalgias and neck pain.   Skin: Negative for pallor, rash and wound.   Allergic/Immunologic: Negative for environmental allergies and immunocompromised state.   Neurological: Positive for numbness (left torso and upper extremity). Negative for dizziness, seizures, syncope, weakness and headaches.   Hematological: Negative for adenopathy. Does not bruise/bleed easily.   Psychiatric/Behavioral: Negative for agitation, confusion and sleep disturbance.     Objective:     Vital Signs (Most Recent):  Temp: 97.5 °F (36.4 °C) (03/01/18 1101)  Pulse: 95 (03/01/18 1101)  Resp: 18 (03/01/18 1101)  BP: 132/84 (03/01/18 1101)  SpO2: 96 % (03/01/18 1101) Vital Signs (24h Range):  Temp:  [97.5 °F (36.4 °C)-98 °F (36.7 °C)] 97.5 °F (36.4 °C)  Pulse:  [] 95  Resp:  [18] 18  SpO2:  [94 %-98 %] 96 %  BP: (114-133)/(71-84) 132/84     Weight: 75.8 kg (167 lb 1.7 oz)  Body mass index is 28.68 kg/m².    Intake/Output Summary (Last 24 hours) at 03/01/18 1442  Last data filed at 03/01/18 1000   Gross per 24 hour   Intake              700 ml   Output                0 ml   Net              700 ml      Physical Exam   Constitutional: He is oriented to person, place, and time. He appears well-developed and well-nourished.   HENT:   Head: Normocephalic and atraumatic.   Eyes: Conjunctivae are normal.   Neck: Neck supple. No JVD present.   Cardiovascular: Normal rate, regular rhythm and normal heart sounds.    Pulmonary/Chest: Effort normal and breath sounds normal. He has no wheezes.   Abdominal: Soft. Bowel sounds are normal. He exhibits no distension. There is no tenderness.   Musculoskeletal: Normal range of motion.   Neurological: He is alert and oriented to person, place, and time.   4/5 strength to left upper and lower  extremities. 5/5 strength in all other extremities.    Skin: Skin is warm and dry. No rash noted.   Psychiatric: He has a normal mood and affect. Thought content normal. He is not agitated.   Nursing note and vitals reviewed.    Significant Labs:   CBC: No results for input(s): WBC, HGB, HCT, PLT in the last 48 hours.  CMP:   Recent Labs  Lab 02/27/18  1634 02/28/18  0443 03/01/18  0428    135* 140   K 3.8 4.6 4.4    107 107   CO2 24 18* 21*   GLU 96 195* 151*   BUN 10 19 13   CREATININE 0.8 0.9 0.8   CALCIUM 8.9 9.2 9.3   PROT 7.1 6.9 7.0   ALBUMIN 3.9 3.7 3.8   BILITOT 0.5 0.3 0.3   ALKPHOS 84 78 77   AST 15 13 12   ALT 23 24 23   ANIONGAP 11 10 12   EGFRNONAA >60 >60 >60       Significant Imaging:   Imaging Results          FL Lumbar Puncture (xpd) (Final result)  Result time 02/27/18 15:00:15    Final result by Trudy Leger MD (02/27/18 15:00:15)                 Impression:      1. Successful fluoroscopically guided lumbar puncture.      Electronically signed by: TRUDY LEGER MD  Date:     02/27/18  Time:    15:00              Narrative:    Procedure:  Lumbar puncture, diagnostic    Clinical History:     Multiple sclerosis.    Technique/findings: After the risks, benefits and options of the planned procedure were explained to the patient in full detail, the patient expressed complete understanding and gave signed informed consent. procedure was performed by David Aguiar PA-C under the direct supervision of Dr. VASHTI Leger.  The left paraspinous space was localized with fluoroscopy. The skin was marked with indelible ink.  The skin overlying this area was prepped and draped in the usual sterile fashion. A timeout was performed. 1% lidocaine was used as a local anesthetic, and a 22 gauge needle were used to perform a lumbar puncture at L3-L4.  Blunt cc of clear CSF was removed with an opening pressure of 21 cm of water.  The needle was then removed.  A sterile dressing was applied.  The patient  tolerated the procedure well without complication, departing the fluoro suite  in unchanged condition.     Fluoroscopic time was  0.1 minutes  and 1 image was obtained.                                Assessment/Plan:      * MS (multiple sclerosis)    Neurology input appreciated.   MRI consistent with MS  PT/OT recommends outpatient therapy  IV steroids per Neurology             Depression    He admits to hopelessness and wishing to die. Denies suicide plan or intent to hurt himself or others.   Recently diagnosis with MS   consult   No plan   Monitor   -Start Paxil            Tobacco abuse      Smoking cessation strongly encouraged          VTE Risk Mitigation         Ordered     enoxaparin injection 40 mg  Daily     Route:  Subcutaneous        02/28/18 1638     Medium Risk of VTE  Once      02/27/18 1331          Yocasta Osullivan NP  Department of Hospital Medicine   [facility]

## 2018-03-01 NOTE — PROGRESS NOTES
"Attempted to follow up on referral for counseling. Patient stated that he is attempting to "nap". Provided contact information, informed him of the purpose of care management, encouraged him to phone .   "

## 2018-03-01 NOTE — SUBJECTIVE & OBJECTIVE
Interval History:no acute events overnight. PT/OT recommends outpatient therapy.     Review of Systems   Constitutional: Positive for fatigue. Negative for appetite change, chills, diaphoresis and fever.   HENT: Negative for congestion, ear pain, mouth sores, sore throat and trouble swallowing.    Eyes: Negative for pain and visual disturbance.   Respiratory: Negative for cough, chest tightness and shortness of breath.    Cardiovascular: Negative for chest pain, palpitations and leg swelling.   Gastrointestinal: Negative for abdominal pain, constipation, diarrhea and nausea.   Endocrine: Negative for cold intolerance, heat intolerance, polydipsia and polyuria.   Genitourinary: Negative for dysuria, frequency and hematuria.   Musculoskeletal: Negative for arthralgias, back pain, myalgias and neck pain.   Skin: Negative for pallor, rash and wound.   Allergic/Immunologic: Negative for environmental allergies and immunocompromised state.   Neurological: Positive for numbness (left torso and upper extremity). Negative for dizziness, seizures, syncope, weakness and headaches.   Hematological: Negative for adenopathy. Does not bruise/bleed easily.   Psychiatric/Behavioral: Negative for agitation, confusion and sleep disturbance.     Objective:     Vital Signs (Most Recent):  Temp: 97.5 °F (36.4 °C) (03/01/18 1101)  Pulse: 95 (03/01/18 1101)  Resp: 18 (03/01/18 1101)  BP: 132/84 (03/01/18 1101)  SpO2: 96 % (03/01/18 1101) Vital Signs (24h Range):  Temp:  [97.5 °F (36.4 °C)-98 °F (36.7 °C)] 97.5 °F (36.4 °C)  Pulse:  [] 95  Resp:  [18] 18  SpO2:  [94 %-98 %] 96 %  BP: (114-133)/(71-84) 132/84     Weight: 75.8 kg (167 lb 1.7 oz)  Body mass index is 28.68 kg/m².    Intake/Output Summary (Last 24 hours) at 03/01/18 1442  Last data filed at 03/01/18 1000   Gross per 24 hour   Intake              700 ml   Output                0 ml   Net              700 ml      Physical Exam   Constitutional: He is oriented to person,  place, and time. He appears well-developed and well-nourished.   HENT:   Head: Normocephalic and atraumatic.   Eyes: Conjunctivae are normal.   Neck: Neck supple. No JVD present.   Cardiovascular: Normal rate, regular rhythm and normal heart sounds.    Pulmonary/Chest: Effort normal and breath sounds normal. He has no wheezes.   Abdominal: Soft. Bowel sounds are normal. He exhibits no distension. There is no tenderness.   Musculoskeletal: Normal range of motion.   Neurological: He is alert and oriented to person, place, and time.   4/5 strength to left upper and lower extremities. 5/5 strength in all other extremities.    Skin: Skin is warm and dry. No rash noted.   Psychiatric: He has a normal mood and affect. Thought content normal. He is not agitated.   Nursing note and vitals reviewed.    Significant Labs:   CBC: No results for input(s): WBC, HGB, HCT, PLT in the last 48 hours.  CMP:   Recent Labs  Lab 02/27/18  1634 02/28/18  0443 03/01/18  0428    135* 140   K 3.8 4.6 4.4    107 107   CO2 24 18* 21*   GLU 96 195* 151*   BUN 10 19 13   CREATININE 0.8 0.9 0.8   CALCIUM 8.9 9.2 9.3   PROT 7.1 6.9 7.0   ALBUMIN 3.9 3.7 3.8   BILITOT 0.5 0.3 0.3   ALKPHOS 84 78 77   AST 15 13 12   ALT 23 24 23   ANIONGAP 11 10 12   EGFRNONAA >60 >60 >60       Significant Imaging:   Imaging Results          FL Lumbar Puncture (xpd) (Final result)  Result time 02/27/18 15:00:15    Final result by Trudy Leger MD (02/27/18 15:00:15)                 Impression:      1. Successful fluoroscopically guided lumbar puncture.      Electronically signed by: TRUDY LEGER MD  Date:     02/27/18  Time:    15:00              Narrative:    Procedure:  Lumbar puncture, diagnostic    Clinical History:     Multiple sclerosis.    Technique/findings: After the risks, benefits and options of the planned procedure were explained to the patient in full detail, the patient expressed complete understanding and gave signed informed consent.  procedure was performed by David Aguiar PA-C under the direct supervision of Dr. VASHTI Leger.  The left paraspinous space was localized with fluoroscopy. The skin was marked with indelible ink.  The skin overlying this area was prepped and draped in the usual sterile fashion. A timeout was performed. 1% lidocaine was used as a local anesthetic, and a 22 gauge needle were used to perform a lumbar puncture at L3-L4.  Blunt cc of clear CSF was removed with an opening pressure of 21 cm of water.  The needle was then removed.  A sterile dressing was applied.  The patient tolerated the procedure well without complication, departing the fluoro suite  in unchanged condition.     Fluoroscopic time was  0.1 minutes  and 1 image was obtained.

## 2018-03-01 NOTE — PLAN OF CARE
Problem: Patient Care Overview  Goal: Plan of Care Review  Remains injury free. Pain managed with meds. Patient states that he does not want to commit suicide, nor does he wish to harm himself. He is trying to understand his new diagnosis. Family visiting. No s/s acute distress.

## 2018-03-01 NOTE — ASSESSMENT & PLAN NOTE
Neurology input appreciated.   MRI consistent with MS  PT/OT recommends outpatient therapy  IV steroids per Neurology

## 2018-03-01 NOTE — PT/OT/SLP EVAL
Occupational Therapy   Evaluation    Name: Logan Haywood  MRN: [id number]  Admitting Diagnosis:  MS (multiple sclerosis)      Recommendations:     Discharge Recommendations: outpatient OT  Discharge Equipment Recommendations:  none  Barriers to discharge:       History:     Occupational Profile:  Living Environment: lives with brother in 1 story house  Previous level of function: (i) with adl's, functional mobility and t/f's. Pt reports still drives  Roles and Routines: occupational therapy  Equipment Owned:  none  Assistance upon Discharge:     History reviewed. No pertinent past medical history.    Past Surgical History:   Procedure Laterality Date    TONSILLECTOMY         Subjective     Chief Complaint: debility and generalized weakness  Patient/Family stated goals:   Communicated with: student nurse allyssa and epic chart review prior to session.  Pain/Comfort:  · Pain Rating 1: 0/10    Patients cultural, spiritual, [employer] conflicts given the current situation:      Objective:     Patient found with:      General Precautions: Standard, fall   Orthopedic Precautions:N/A   Braces:       Occupational Performance:    Bed Mobility:    · Patient completed Rolling/Turning to Left with  stand by assistance  · Patient completed Rolling/Turning to Right with stand by assistance  · Patient completed Scooting/Bridging with stand by assistance  · Patient completed Supine to Sit with stand by assistance  · Patient completed Sit to Supine with stand by assistance    Functional Mobility/Transfers:  · Patient completed Sit <> Stand Transfer with contact guard assistance  with  no assistive device   · Patient completed Bed <> Chair Transfer using Stand Pivot technique with minimum assistance with no assistive device  · Functional Mobility: pt req min a with functional mobility a few steps    Activities of Daily Living:  · UB Dressing: minimum assistance x1  · LB Dressing: minimum assistance x1  · Toileting: stand by  "assistance x1 per pt report    Cognitive/Visual Perceptual:  Cognitive/Psychosocial Skills:     -       Oriented to: Person, Place, Time and Situation   -       Follows Commands/attention:Follows two-step commands  -       Communication: clear/fluent  -       Memory: No Deficits noted  -       Safety awareness/insight to disability: intact   Visual/Perceptual:      -Intact    Physical Exam:  Upper Extremity Range of Motion:     -       Right Upper Extremity: WFL  -       Left Upper Extremity: WFL  Upper Extremity Strength:    -       Right Upper Extremity: mmt: 3/5 grossly  -       Left Upper Extremity: mmt: 3/5 grossly   Strength:    -       Right Upper Extremity: mmt: 3/5 grossly  -       Left Upper Extremity: mmt: 3/5 grossly    Patient left up in chair with all lines intact, call button in reach, stundent nurses notified and student nurses present present    [facility] 6 Click:  [facility] Total Score: 18    Treatment & Education:    Education:    Assessment:     Logan Haywood is a 47 y.o. male with a medical diagnosis of MS (multiple sclerosis).  He presents with Performance deficits affecting function are weakness, impaired self care skills, impaired balance, decreased coordination, decreased safety awareness, decreased ROM, impaired endurance, impaired functional mobilty, gait instability, decreased lower extremity function.      Rehab Prognosis:  Fair+; patient would benefit from acute skilled OT services to address these deficits and reach maximum level of function.         Clinical Decision Makin.  OT Low:  "Pt evaluation falls under low complexity for evaluation coding due to performance deficits noted in 1-3 areas as stated above and 0 co-morbities affecting current functional status. Data obtained from problem focused assessments. No modifications or assistance was required for completion of evaluation. Only brief occupational profile and history review completed."     Plan:     Patient to be " seen 3 x/week to address the above listed problems via self-care/home management, therapeutic activities, therapeutic exercises  · Plan of Care Expires:    · Plan of Care Reviewed with: patient    This Plan of care has been discussed with the patient who was involved in its development and understands and is in agreement with the identified goals and treatment plan    GOALS:    Occupational Therapy Goals        Problem: Occupational Therapy Goal    Goal Priority Disciplines Outcome Interventions   Occupational Therapy Goal     OT, PT/OT     Description:  ot goals to be met by 3-8-18  1. s with ue dressing  2. s with le dressing  3. Pt will tolerate  1 set x 15 reps b ue rom exercise with min resistance  4. Pt will perform fm coordination activity with min rewsistance                      Time Tracking:     OT Date of Treatment: 03/01/18  OT Start Time: 1135  OT Stop Time: 1200  OT Total Time (min): 25 min    Billable Minutes:Evaluation 10 minutes  Self Care/Home Management 15 minutes    Nayana Valle OT  3/1/2018

## 2018-03-02 PROBLEM — K59.00 CONSTIPATION: Status: ACTIVE | Noted: 2018-03-02

## 2018-03-02 LAB
ALBUMIN CSF-MCNC: 26.4 MG/DL
ALBUMIN SERPL BCP-MCNC: 3.5 G/DL
ALBUMIN SERPL-MCNC: 3740 MG/DL
ALP SERPL-CCNC: 66 U/L
ALT SERPL W/O P-5'-P-CCNC: 22 U/L
ANION GAP SERPL CALC-SCNC: 11 MMOL/L
AST SERPL-CCNC: 10 U/L
BILIRUB SERPL-MCNC: 0.3 MG/DL
BUN SERPL-MCNC: 12 MG/DL
CALCIUM SERPL-MCNC: 8.8 MG/DL
CHLORIDE SERPL-SCNC: 105 MMOL/L
CO2 SERPL-SCNC: 24 MMOL/L
CREAT SERPL-MCNC: 0.8 MG/DL
EST. GFR  (AFRICAN AMERICAN): >60 ML/MIN/1.73 M^2
EST. GFR  (NON AFRICAN AMERICAN): >60 ML/MIN/1.73 M^2
GLUCOSE SERPL-MCNC: 147 MG/DL
IGG CSF-MCNC: 3.1 MG/DL
IGG SERPL-MCNC: 692 MG/DL
IGG SYNTH RATE SER+CSF CALC-MRATE: 3.65 MG/24 H
IGG/ALB CLEAR SER+CSF-RTO: 0.63
IGG/ALB CSF: 0.12 {RATIO}
IGG/ALB SER: 0.19 {RATIO}
OLIGOCLONAL BANDS CSF ELPH-IMP: 4 BANDS
OLIGOCLONAL BANDS CSF ELPH-IMP: 4 BANDS
OLIGOCLONAL BANDS SERPL: 0 BANDS
POCT GLUCOSE: 122 MG/DL (ref 70–110)
POCT GLUCOSE: 135 MG/DL (ref 70–110)
POCT GLUCOSE: 241 MG/DL (ref 70–110)
POTASSIUM SERPL-SCNC: 4 MMOL/L
PROT SERPL-MCNC: 6.3 G/DL
SODIUM SERPL-SCNC: 140 MMOL/L

## 2018-03-02 PROCEDURE — 25000003 PHARM REV CODE 250: Performed by: PSYCHIATRY & NEUROLOGY

## 2018-03-02 PROCEDURE — 80053 COMPREHEN METABOLIC PANEL: CPT

## 2018-03-02 PROCEDURE — 36415 COLL VENOUS BLD VENIPUNCTURE: CPT

## 2018-03-02 PROCEDURE — S4991 NICOTINE PATCH NONLEGEND: HCPCS | Performed by: PHYSICIAN ASSISTANT

## 2018-03-02 PROCEDURE — 97116 GAIT TRAINING THERAPY: CPT

## 2018-03-02 PROCEDURE — 25000003 PHARM REV CODE 250: Performed by: NURSE PRACTITIONER

## 2018-03-02 PROCEDURE — 63600175 PHARM REV CODE 636 W HCPCS: Performed by: NURSE PRACTITIONER

## 2018-03-02 PROCEDURE — 99233 SBSQ HOSP IP/OBS HIGH 50: CPT | Mod: ,,, | Performed by: PSYCHIATRY & NEUROLOGY

## 2018-03-02 PROCEDURE — 21400001 HC TELEMETRY ROOM

## 2018-03-02 PROCEDURE — 94761 N-INVAS EAR/PLS OXIMETRY MLT: CPT

## 2018-03-02 PROCEDURE — 97530 THERAPEUTIC ACTIVITIES: CPT

## 2018-03-02 PROCEDURE — 63600175 PHARM REV CODE 636 W HCPCS: Performed by: PSYCHIATRY & NEUROLOGY

## 2018-03-02 PROCEDURE — 96372 THER/PROPH/DIAG INJ SC/IM: CPT

## 2018-03-02 PROCEDURE — 97110 THERAPEUTIC EXERCISES: CPT

## 2018-03-02 PROCEDURE — 92610 EVALUATE SWALLOWING FUNCTION: CPT

## 2018-03-02 PROCEDURE — 25000003 PHARM REV CODE 250: Performed by: PHYSICIAN ASSISTANT

## 2018-03-02 RX ADMIN — CLONAZEPAM 0.5 MG: 0.5 TABLET ORAL at 10:03

## 2018-03-02 RX ADMIN — ENOXAPARIN SODIUM 40 MG: 100 INJECTION SUBCUTANEOUS at 05:03

## 2018-03-02 RX ADMIN — FAMOTIDINE 20 MG: 20 TABLET ORAL at 09:03

## 2018-03-02 RX ADMIN — PAROXETINE HYDROCHLORIDE 20 MG: 20 TABLET, FILM COATED ORAL at 09:03

## 2018-03-02 RX ADMIN — STANDARDIZED SENNA CONCENTRATE AND DOCUSATE SODIUM 1 TABLET: 8.6; 5 TABLET, FILM COATED ORAL at 09:03

## 2018-03-02 RX ADMIN — HYDROCODONE BITARTRATE AND ACETAMINOPHEN 1 TABLET: 10; 325 TABLET ORAL at 05:03

## 2018-03-02 RX ADMIN — ALUMINUM HYDROXIDE, MAGNESIUM HYDROXIDE, AND SIMETHICONE 15 ML: 200; 200; 20 SUSPENSION ORAL at 09:03

## 2018-03-02 RX ADMIN — DEXTROSE 500 MG: 50 INJECTION, SOLUTION INTRAVENOUS at 09:03

## 2018-03-02 RX ADMIN — POLYETHYLENE GLYCOL 3350 17 G: 17 POWDER, FOR SOLUTION ORAL at 09:03

## 2018-03-02 RX ADMIN — CLONAZEPAM 0.5 MG: 0.5 TABLET ORAL at 09:03

## 2018-03-02 RX ADMIN — HYDROCODONE BITARTRATE AND ACETAMINOPHEN 1 TABLET: 10; 325 TABLET ORAL at 11:03

## 2018-03-02 RX ADMIN — HYDROCODONE BITARTRATE AND ACETAMINOPHEN 1 TABLET: 10; 325 TABLET ORAL at 10:03

## 2018-03-02 RX ADMIN — NICOTINE 1 PATCH: 21 PATCH, EXTENDED RELEASE TRANSDERMAL at 11:03

## 2018-03-02 NOTE — PT/OT/SLP EVAL
Speech Language Pathology Evaluation  Bedside Swallow    Patient Name:  Logan Haywood   MRN:  [id number]  Admitting Diagnosis: MS (multiple sclerosis)    Recommendations:                 General Recommendations:  Follow-up not indicated  Diet recommendations:  Regular, Thin   Aspiration Precautions:    General Precautions: Standard,    Communication strategies:  none    History:     History reviewed. No pertinent past medical history.    Past Surgical History:   Procedure Laterality Date    TONSILLECTOMY         Social History: .    Prior Intubation HX:    Modified Barium Swallow: none    Chest X-Rays:     Prior diet: regular    Occupation/hobbies/homemaking:     Subjective     Pt cooperative. Denies swallow difficulty. NP wants baseline on swallow function. No difficulty reported.  Patient goals: none    Objective:     Oral Musculature Evaluation  · Oral Musculature: WFL  · Dentition: present and adequate  · Mucosal Quality: good    Bedside Swallow Eval:   Consistencies Assessed:  · Thin liquids via straw  · Soft solids cracker   Swallow assessed at bedside. Swallow appears WFL at this time. No overt s/s of aspiration or any difficulty noted.   Oral Phase:   · WFL    Pharyngeal Phase:   · no overt clinical signs/symptoms of aspiration  · no overt clinical signs/symptoms of pharyngeal dysphagia    Compensatory Strategies  · None    Treatment:     Assessment:     Logan Haywood is a 47 y.o. male with an SLP diagnosis of functional swallow.      Goals:    SLP Goals     Not on file                Plan:     · Patient to be seen:      · Plan of Care expires:     · Plan of Care reviewed with:  patient   · SLP Follow-Up:  No       Discharge recommendations:      Barriers to Discharge:  None    Time Tracking:     SLP Treatment Date:   03/02/18  Speech Start Time:  1040  Speech Stop Time:  1052     Speech Total Time (min):  12 min    Billable Minutes: Eval Swallow and Oral Function 12    Taniya Rivas  CCC-SLP  03/02/2018

## 2018-03-02 NOTE — PLAN OF CARE
Problem: Physical Therapy Goal  Goal: Physical Therapy Goal  Goals to be met by: 18     Patient will increase functional independence with mobility by performin. Sit to stand transfer with Supervision  2. Bed to chair transfer with Supervision, without LOB  3. Gait  2 x 150 feet with Supervision normal foot clearance on L, no LOB  4. Lower extremity exercise program x20 reps per handout, with supervision  5. Complete precision/repetition exercises with 80% accuracy     Outcome: Ongoing (interventions implemented as appropriate)  Pt ambulated 2 x 70 feet at [facility], with LOB  X 2  Self corrected with Min A.

## 2018-03-02 NOTE — ASSESSMENT & PLAN NOTE
He admits to hopelessness and wishing to die. Denies suicide plan or intent to hurt himself or others.   Recently diagnosis with MS   consult   Monitor   -Start Paxil

## 2018-03-02 NOTE — PROGRESS NOTES
Progress note  Neurology      Neurology follow up:  For:  MS    SUBJECTIVE:      HPI:   Patient woke up from sleep. Last night he could not sleep well because of nursing activities. He said that he used to have RLS which has helped with Clonopin he is taking now at night. He still weaker on the left side. He is dysmetric in doing FNF testing. Gait is off a little.       History reviewed. No pertinent past medical history.  Past Surgical History:   Procedure Laterality Date    TONSILLECTOMY       Family History   Problem Relation Age of Onset    Hypertension Mother     COPD Mother     Heart disease Father     Diabetes Sister     Stroke Brother      Social History   Substance Use Topics    Smoking status: Current Every Day Smoker     Packs/day: 1.00     Types: Cigarettes    Smokeless tobacco: Not on file    Alcohol use Yes      Comment: seldom       Review of patient's allergies indicates:   Allergen Reactions    Cat/feline products     Chocolate flavor Itching      Patient Active Problem List   Diagnosis    Numbness and tingling    Tobacco abuse    MS (multiple sclerosis)    Depression         Scheduled Meds:   clonazePAM  0.5 mg Oral BID    enoxaparin  40 mg Subcutaneous Daily    ergocalciferol  50,000 Units Oral Q7 Days    famotidine  20 mg Oral BID    methylPREDNISolone (SOLU-Medrol) IVPB (doses > 250 mg)  500 mg Intravenous BID    nicotine  1 patch Transdermal Daily    paroxetine  20 mg Oral Daily    polyethylene glycol  17 g Oral Daily    senna-docusate 8.6-50 mg  1 tablet Oral BID     Continuous Infusions:  PRN Meds:acetaminophen, aluminum-magnesium hydroxide-simethicone, hydrocodone-acetaminophen 10-325mg, methocarbamol, ondansetron, promethazine (PHENERGAN) IVPB      Review of Systems   Constitutional: Negative for fever and weight loss.   HENT: Negative for ear pain, hearing loss and tinnitus.    Eyes: Negative for blurred vision, double vision, photophobia, pain, discharge and  redness.   Respiratory: Negative for cough and shortness of breath.    Cardiovascular: Negative for chest pain, palpitations, claudication and leg swelling.   Gastrointestinal: Negative for abdominal pain, heartburn, nausea and vomiting.   Genitourinary: Negative for dysuria, flank pain, frequency and urgency.   Musculoskeletal: Negative for back pain, falls, joint pain, myalgias and neck pain.   Skin: Negative for itching and rash.   Neurological: Positive for tingling, sensory change, focal weakness and headaches. Negative for dizziness, tremors, speech change, seizures, loss of consciousness and weakness.   Endo/Heme/Allergies: Does not bruise/bleed easily.   Psychiatric/Behavioral: Negative for depression, hallucinations, memory loss and suicidal ideas. The patient is not nervous/anxious and does not have insomnia.            OBJECTIVE:     Vital Signs (Most Recent)  Temp: 98 °F (36.7 °C) (03/02/18 1116)  Pulse: 70 (03/02/18 1116)  Resp: 16 (03/02/18 1116)  BP: 119/73 (03/02/18 1116)  SpO2: 98 % (03/02/18 1116)    Vital Signs Range (Last 24H):  Temp:  [97.7 °F (36.5 °C)-98.2 °F (36.8 °C)]   Pulse:  [68-94]   Resp:  [14-20]   BP: (112-137)/(73-85)   SpO2:  [93 %-98 %]       Physical Exam   Constitutional: He is oriented to person, place, and time. He appears well-developed and well-nourished. No distress.   HENT:   Head: Normocephalic.   Right Ear: External ear normal.   Left Ear: External ear normal.   Mouth/Throat: Oropharynx is clear and moist.   Eyes: Conjunctivae and EOM are normal. Pupils are equal, round, and reactive to light.   Neck: Normal range of motion. Neck supple. No tracheal deviation present. No thyromegaly present.   Cardiovascular: Regular rhythm, normal heart sounds and intact distal pulses.    Pulmonary/Chest: Effort normal and breath sounds normal. No respiratory distress.   Abdominal: Soft. Bowel sounds are normal. There is no tenderness.   Musculoskeletal: He exhibits no edema or  tenderness.   Lymphadenopathy:     He has no cervical adenopathy.   Neurological: He is alert and oriented to person, place, and time. He has normal reflexes. He displays no tremor and normal reflexes. No cranial nerve deficit or sensory deficit. He exhibits normal muscle tone. Coordination normal. He displays no Babinski's sign on the right side. He displays no Babinski's sign on the left side.   Reflex Scores:       Tricep reflexes are 2+ on the right side and 2+ on the left side.       Bicep reflexes are 2+ on the right side and 2+ on the left side.       Brachioradialis reflexes are 2+ on the right side and 2+ on the left side.       Patellar reflexes are 2+ on the right side and 2+ on the left side.       Achilles reflexes are 2+ on the right side and 2+ on the left side.  Skin: Skin is warm and dry. No rash noted. He is not diaphoretic. No erythema. No pallor.   Psychiatric: He has a normal mood and affect. His behavior is normal. Judgment and thought content normal.           Strength   Deltoids Triceps Biceps Wrist Extension Wrist Flexion Hand    Upper: R 5/5 5/5 5/5 5/5 5/5 5/5     L 4+/5 4+/5 5/5 4+/5 4+/5 4/5       Iliopsoas Quadriceps Knee  Flexion Tibialis  anterior Gastro- cnemius EHL   Lower: R 5/5 5/5 5/5 5/5 5/5 5/5     L 4+/5 4+/5 5/5 4+/5 4+/5 5/5           Laboratory:  Lab Results   Component Value Date    WBC 8.27 02/27/2018    HGB 16.0 02/27/2018    HCT 45.3 02/27/2018     02/27/2018    ALT 22 03/02/2018    AST 10 03/02/2018     03/02/2018    K 4.0 03/02/2018     03/02/2018    CREATININE 0.8 03/02/2018    BUN 12 03/02/2018    CO2 24 03/02/2018    TSH 0.686 02/28/2018    INR 1.0 02/27/2018   Results for RODGER NGUYEN (MRN [id number]) as of 3/2/2018 13:48   Ref. Range 2/27/2018 14:53   Color, CSF Latest Ref Range: Colorless  Colorless   Heme Aliquot Latest Units: mL 5.0   Appearance, CSF Latest Ref Range: Clear  Clear   WBC, CSF Latest Ref Range: 0 - 5 /cu mm 2   RBC,  CSF Latest Ref Range: 0 /cu mm 2 (A)   Glucose, CSF Latest Ref Range: 40 - 70 mg/dL 74 (H)   LDH, CSF Latest Units: U/L 26   Protein, CSF Latest Ref Range: 15 - 40 mg/dL 50 (H)   Body Fluid Source, Refrigerated Unknown CSF           Diagnostic Results:  Impression           Findings are most concerning for demyelinating disease and multiple sclerosis.  One of the lesions within the subcortical white matter of the left temporal lobe demonstrates abnormal enhancement as may be seen with active demyelination.      MRI of C-spine:  Impression           There are four separate foci of abnormal T2 signal within the cervical cord as discussed above.  The one at the level of C2 demonstrates abnormal enhancement.  These findings are concerning for demyelinating disease such as multiple sclerosis.    Abnormal enhancement may be seen in active demyelination.      Thoracic MRI;  Impression       There is mild degenerative change as discussed above without significant canal or foraminal stenosis.  No abnormal cord signal.         ASSESSMENT/PLAN:     Assessment:   1. Multiple sclerosis , newly diagnosed . He has so far 6 dose of Solumedrol and he should take 10 doses in total.   Cont. Other medication as prescribed. Cont. PT/OT here .      Patient Active Problem List   Diagnosis    Numbness and tingling    Tobacco abuse    MS (multiple sclerosis)    Depression         Plan: Cont. Steroid for 10 doses  In total and then he will go home for out patient PT/OT .

## 2018-03-02 NOTE — SUBJECTIVE & OBJECTIVE
Interval History: Left sided weakness and constipation       Review of Systems   Constitutional: Positive for fatigue. Negative for appetite change, chills, diaphoresis and fever.   HENT: Negative for congestion, ear pain, mouth sores, sore throat and trouble swallowing.    Eyes: Negative for pain and visual disturbance.   Respiratory: Negative for cough, chest tightness and shortness of breath.    Cardiovascular: Negative for chest pain, palpitations and leg swelling.   Gastrointestinal: Positive for constipation. Negative for abdominal pain, diarrhea and nausea.   Endocrine: Negative for cold intolerance, heat intolerance, polydipsia and polyuria.   Genitourinary: Negative for dysuria, frequency and hematuria.   Musculoskeletal: Negative for arthralgias, back pain, myalgias and neck pain.   Skin: Negative for pallor, rash and wound.   Allergic/Immunologic: Negative for environmental allergies and immunocompromised state.   Neurological: Positive for weakness (left sided ) and numbness (left torso and upper extremity). Negative for dizziness, seizures, syncope and headaches.   Hematological: Negative for adenopathy. Does not bruise/bleed easily.   Psychiatric/Behavioral: Negative for agitation, confusion and sleep disturbance.     Objective:     Vital Signs (Most Recent):  Temp: 98 °F (36.7 °C) (03/02/18 1116)  Pulse: 70 (03/02/18 1116)  Resp: 16 (03/02/18 1116)  BP: 119/73 (03/02/18 1116)  SpO2: 98 % (03/02/18 1116) Vital Signs (24h Range):  Temp:  [97.7 °F (36.5 °C)-98.2 °F (36.8 °C)] 98 °F (36.7 °C)  Pulse:  [68-94] 70  Resp:  [14-20] 16  SpO2:  [93 %-98 %] 98 %  BP: (112-137)/(73-85) 119/73     Weight: 75.8 kg (167 lb 1.7 oz)  Body mass index is 28.68 kg/m².    Intake/Output Summary (Last 24 hours) at 03/02/18 1330  Last data filed at 03/02/18 1100   Gross per 24 hour   Intake              960 ml   Output                0 ml   Net              960 ml      Physical Exam   Constitutional: He is oriented to  person, place, and time. He appears well-developed and well-nourished.   HENT:   Head: Normocephalic and atraumatic.   Eyes: Conjunctivae are normal.   Neck: Neck supple. No JVD present.   Cardiovascular: Normal rate, regular rhythm and normal heart sounds.    Pulmonary/Chest: Effort normal and breath sounds normal. He has no wheezes.   Abdominal: Soft. Bowel sounds are normal. He exhibits no distension. There is no tenderness.   Musculoskeletal: Normal range of motion.   Neurological: He is alert and oriented to person, place, and time.   4/5 strength to left upper and lower extremities. 5/5 strength in all other extremities.    Skin: Skin is warm and dry. No rash noted.   Psychiatric: He has a normal mood and affect. Thought content normal. He is not agitated.   Nursing note and vitals reviewed.    Significant Labs:   CBC: No results for input(s): WBC, HGB, HCT, PLT in the last 48 hours.  CMP:     Recent Labs  Lab 03/01/18  0428 03/02/18  0501    140   K 4.4 4.0    105   CO2 21* 24   * 147*   BUN 13 12   CREATININE 0.8 0.8   CALCIUM 9.3 8.8   PROT 7.0 6.3   ALBUMIN 3.8 3.5   BILITOT 0.3 0.3   ALKPHOS 77 66   AST 12 10   ALT 23 22   ANIONGAP 12 11   EGFRNONAA >60 >60       Significant Imaging:   Imaging Results          FL Lumbar Puncture (xpd) (Final result)  Result time 02/27/18 15:00:15    Final result by Trudy Leger MD (02/27/18 15:00:15)                 Impression:      1. Successful fluoroscopically guided lumbar puncture.      Electronically signed by: TRUDY LEGER MD  Date:     02/27/18  Time:    15:00              Narrative:    Procedure:  Lumbar puncture, diagnostic    Clinical History:     Multiple sclerosis.    Technique/findings: After the risks, benefits and options of the planned procedure were explained to the patient in full detail, the patient expressed complete understanding and gave signed informed consent. procedure was performed by David Aguiar PA-C under the direct  supervision of Dr. VASHTI Leger.  The left paraspinous space was localized with fluoroscopy. The skin was marked with indelible ink.  The skin overlying this area was prepped and draped in the usual sterile fashion. A timeout was performed. 1% lidocaine was used as a local anesthetic, and a 22 gauge needle were used to perform a lumbar puncture at L3-L4.  Blunt cc of clear CSF was removed with an opening pressure of 21 cm of water.  The needle was then removed.  A sterile dressing was applied.  The patient tolerated the procedure well without complication, departing the fluoro suite  in unchanged condition.     Fluoroscopic time was  0.1 minutes  and 1 image was obtained.

## 2018-03-02 NOTE — PT/OT/SLP PROGRESS
Occupational Therapy      Patient Name:  Logan Haywood   MRN:  [id number]    S: pt cooperative with therapy session  O: pt performed 12 set x 15 reps b ue rom exercise with min resistance in all available planes and ranges supine in bed with hob elevated  A: pt displayed improvements with b ue strength as evidence by completing hep  P: continue with poc  Nayana Valle, OT  3/2/2018   [id number]  1 cheryle

## 2018-03-02 NOTE — PLAN OF CARE
Problem: Occupational Therapy Goal  Goal: Occupational Therapy Goal  ot goals to be met by 3-8-18  1. s with ue dressing  2. s with le dressing  3. Pt will tolerate  1 set x 15 reps b ue rom exercise with min resistance  4. Pt will perform fm coordination activity with min rewsistance     Improvements with b ue strength/endurance as evidence by completing hep

## 2018-03-02 NOTE — PT/OT/SLP PROGRESS
Physical Therapy  Treatment    Logan Haywood   MRN: [id number]   Admitting Diagnosis: MS (multiple sclerosis)    PT Received On: 03/02/18  PT Start Time: 0905     PT Stop Time: 0930    PT Total Time (min): 25 min       Billable Minutes:  Gait Training 15 and Therapeutic Activity 10    Treatment Type: Treatment  PT/PTA: PT             General Precautions: Standard, fall  Orthopedic Precautions: N/A   Braces: N/A         Subjective:  Communicated with epic and nurse RAÚL prior to session.  PT AGREES TO THERAPY    Pain/Comfort  Pain Rating 1: 0/10    Objective:   Patient found with: telemetry, peripheral IV    Functional Mobility:  Bed Mobility: SUPERVISION       Transfers: SBA       Gait: CGA/ MEL, FOR GAIT IN HALLWAY, 2 X 70 FEET WITH DECREASED KNEE CONTROL ON LLE, LOB X 2 SELF CORRECTED WITH MEL    Therapeutic Activities and Exercises:  SAFETY INSTRUCTION, AND QUESTIONS AND CONCERNS ADDRESSED.  PT ASSISTED TO SITTING ON SIDE OF BED 5 MINS TO KARLO NON-SKID SOCKS AND GOWN     AM-PAC 6 CLICK MOBILITY  How much help from another person does this patient currently need?   1 = Unable, Total/Dependent Assistance  2 = A lot, Maximum/Moderate Assistance  3 = A little, Minimum/Contact Guard/Supervision  4 = None, Modified [place]/Independent    Turning over in bed (including adjusting bedclothes, sheets and blankets)?: 4  Moving from lying on back to sitting on the side of the bed?: 3  Moving to and from a bed to a chair (including a wheelchair)?: 3  Need to walk in hospital room?: 3  Climbing 3-5 steps with a railing?: 1    AM-PAC Raw Score CMS G-Code Modifier Level of Impairment Assistance   6 % Total / Unable   7 - 9 CM 80 - 100% Maximal Assist   10 - 14 CL 60 - 80% Moderate Assist   15 - 19 CK 40 - 60% Moderate Assist   20 - 22 CJ 20 - 40% Minimal Assist   23 CI 1-20% SBA / CGA   24 CH 0% Independent/ Mod I     Patient left up in chair with all lines intact, call button in reach and NURSING   present.    Assessment:  Logan Haywood is a 47 y.o. male with a medical diagnosis of MS (multiple sclerosis) and presents with IMPAIRED MOBILITY AND WILL NEED CONT P.T.    Rehab identified problem list/impairments: Rehab identified problem list/impairments: weakness, impaired endurance, gait instability, impaired functional mobilty, impaired balance, decreased lower extremity function, decreased upper extremity function, decreased safety awareness    Rehab potential is good.    Activity tolerance: Good    Discharge recommendations: Discharge Facility/Level Of Care Needs: outpatient PT (inpt rehab if pt willing)     Barriers to discharge:  NONE    Equipment recommendations: Equipment Needed After Discharge: none     GOALS:    Physical Therapy Goals        Problem: Physical Therapy Goal    Goal Priority Disciplines Outcome Goal Variances Interventions   Physical Therapy Goal     PT/OT, PT Ongoing (interventions implemented as appropriate)     Description:  Goals to be met by: 18     Patient will increase functional independence with mobility by performin. Sit to stand transfer with Supervision  2. Bed to chair transfer with Supervision, without LOB  3. Gait  2 x 150 feet with Supervision normal foot clearance on L, no LOB  4. Lower extremity exercise program x20 reps per handout, with supervision  5. Complete precision/repetition exercises with 80% accuracy                      PLAN:    Patient to be seen 5 x/week  to address the above listed problems via gait training, therapeutic activities, therapeutic exercises  Plan of Care expires: 18  Plan of Care reviewed with: patient         Caitlin Aryan, PT  2018

## 2018-03-02 NOTE — PROGRESS NOTES
[facility] Hospital Medicine  Progress Note    Patient Name: Logan Haywood  MRN: [id number]  Patient Class: IP- Inpatient   Admission Date: 2/27/2018  Length of Stay: 3 days  Attending Physician: Brayden Stover MD  Primary Care Provider: Primary Doctor No        Subjective:     Principal Problem:MS (multiple sclerosis)    HPI:  Mr Haywood is a 47 year old male with PMHx of tobacco use. Patient was seen in the Emergency Room on yesterday as well, however left AMA. He presented to [facility] Emergency Room with numbness and weakness to the left side of body. Associated symptoms include left side neck pain. He has numbness  to his left upper extremity as well as his anterior and posterior torso. He does not have numbness in his left lower extremity. Denies associated symptoms shortness of breath or chest pain. MRI done yesterday showed findings most concerning for demyelinating disease and multiple sclerosis. Patient underwent lumbar puncture today as well. He has been evaluated by Neurology today as well.     Hospital Course:  The pt was admitted with left sided weakness. MRI Brain and C-spine confirmed evidence of demyelinating disease. Care discussed with neurology. Pt underwent LP- MS CSF wok up still pending.  Pt placed on IV Steroids. Slow improvement of symptoms- also complain of right upper back pain. PT/OT recommended rehab versus OP therapy. Pt requests outpatient therapy.   +constipation -no other complaints  Denies SI    Interval History: Left sided weakness and constipation       Review of Systems   Constitutional: Positive for fatigue. Negative for appetite change, chills, diaphoresis and fever.   HENT: Negative for congestion, ear pain, mouth sores, sore throat and trouble swallowing.    Eyes: Negative for pain and visual disturbance.   Respiratory: Negative for cough, chest tightness and shortness of breath.    Cardiovascular: Negative for chest pain, palpitations and leg  swelling.   Gastrointestinal: Positive for constipation. Negative for abdominal pain, diarrhea and nausea.   Endocrine: Negative for cold intolerance, heat intolerance, polydipsia and polyuria.   Genitourinary: Negative for dysuria, frequency and hematuria.   Musculoskeletal: Negative for arthralgias, back pain, myalgias and neck pain.   Skin: Negative for pallor, rash and wound.   Allergic/Immunologic: Negative for environmental allergies and immunocompromised state.   Neurological: Positive for weakness (left sided ) and numbness (left torso and upper extremity). Negative for dizziness, seizures, syncope and headaches.   Hematological: Negative for adenopathy. Does not bruise/bleed easily.   Psychiatric/Behavioral: Negative for agitation, confusion and sleep disturbance.     Objective:     Vital Signs (Most Recent):  Temp: 98 °F (36.7 °C) (03/02/18 1116)  Pulse: 70 (03/02/18 1116)  Resp: 16 (03/02/18 1116)  BP: 119/73 (03/02/18 1116)  SpO2: 98 % (03/02/18 1116) Vital Signs (24h Range):  Temp:  [97.7 °F (36.5 °C)-98.2 °F (36.8 °C)] 98 °F (36.7 °C)  Pulse:  [68-94] 70  Resp:  [14-20] 16  SpO2:  [93 %-98 %] 98 %  BP: (112-137)/(73-85) 119/73     Weight: 75.8 kg (167 lb 1.7 oz)  Body mass index is 28.68 kg/m².    Intake/Output Summary (Last 24 hours) at 03/02/18 1330  Last data filed at 03/02/18 1100   Gross per 24 hour   Intake              960 ml   Output                0 ml   Net              960 ml      Physical Exam   Constitutional: He is oriented to person, place, and time. He appears well-developed and well-nourished.   HENT:   Head: Normocephalic and atraumatic.   Eyes: Conjunctivae are normal.   Neck: Neck supple. No JVD present.   Cardiovascular: Normal rate, regular rhythm and normal heart sounds.    Pulmonary/Chest: Effort normal and breath sounds normal. He has no wheezes.   Abdominal: Soft. Bowel sounds are normal. He exhibits no distension. There is no tenderness.   Musculoskeletal: Normal range of  motion.   Neurological: He is alert and oriented to person, place, and time.   4/5 strength to left upper and lower extremities. 5/5 strength in all other extremities.    Skin: Skin is warm and dry. No rash noted.   Psychiatric: He has a normal mood and affect. Thought content normal. He is not agitated.   Nursing note and vitals reviewed.    Significant Labs:   CBC: No results for input(s): WBC, HGB, HCT, PLT in the last 48 hours.  CMP:     Recent Labs  Lab 03/01/18  0428 03/02/18  0501    140   K 4.4 4.0    105   CO2 21* 24   * 147*   BUN 13 12   CREATININE 0.8 0.8   CALCIUM 9.3 8.8   PROT 7.0 6.3   ALBUMIN 3.8 3.5   BILITOT 0.3 0.3   ALKPHOS 77 66   AST 12 10   ALT 23 22   ANIONGAP 12 11   EGFRNONAA >60 >60       Significant Imaging:   Imaging Results          FL Lumbar Puncture (xpd) (Final result)  Result time 02/27/18 15:00:15    Final result by Trudy Leger MD (02/27/18 15:00:15)                 Impression:      1. Successful fluoroscopically guided lumbar puncture.      Electronically signed by: TRUDY LEGER MD  Date:     02/27/18  Time:    15:00              Narrative:    Procedure:  Lumbar puncture, diagnostic    Clinical History:     Multiple sclerosis.    Technique/findings: After the risks, benefits and options of the planned procedure were explained to the patient in full detail, the patient expressed complete understanding and gave signed informed consent. procedure was performed by David Aguiar PA-C under the direct supervision of Dr. VASHTI Leger.  The left paraspinous space was localized with fluoroscopy. The skin was marked with indelible ink.  The skin overlying this area was prepped and draped in the usual sterile fashion. A timeout was performed. 1% lidocaine was used as a local anesthetic, and a 22 gauge needle were used to perform a lumbar puncture at L3-L4.  Blunt cc of clear CSF was removed with an opening pressure of 21 cm of water.  The needle was then removed.  A  sterile dressing was applied.  The patient tolerated the procedure well without complication, departing the fluoro suite  in unchanged condition.     Fluoroscopic time was  0.1 minutes  and 1 image was obtained.                                Assessment/Plan:      * MS (multiple sclerosis)    MRI consistent with MS  PT/OT recommends outpatient therapy  IV steroids per Neurology             Constipation    Cont Miralax and colace          Depression    He admits to hopelessness and wishing to die. Denies suicide plan or intent to hurt himself or others.   Recently diagnosis with MS   consult   Monitor   -Start Paxil            Tobacco abuse    Smoking cessation strongly encouraged  Nicotine patch           VTE Risk Mitigation         Ordered     enoxaparin injection 40 mg  Daily     Route:  Subcutaneous        02/28/18 1638     Medium Risk of VTE  Once      02/27/18 1331              Yoselyn Jorge NP  Department of Hospital Medicine   [facility]

## 2018-03-02 NOTE — PROGRESS NOTES
"Patient went to vehicle to get new underwear escorted by staff member Mady, unit secretary. Once in vehicle, patient stated, "I'll let you know right now I am going to smoke a cigarette." Patient proceeded to smoke cigarette in his vehicle and security was notified. Security escorted patient back to his room.  "

## 2018-03-03 PROBLEM — F14.10 COCAINE ABUSE: Status: ACTIVE | Noted: 2018-03-03

## 2018-03-03 LAB
ALBUMIN SERPL BCP-MCNC: 3.5 G/DL
ALP SERPL-CCNC: 61 U/L
ALT SERPL W/O P-5'-P-CCNC: 24 U/L
ANION GAP SERPL CALC-SCNC: 10 MMOL/L
AST SERPL-CCNC: 10 U/L
BASOPHILS # BLD AUTO: 0.01 K/UL
BASOPHILS NFR BLD: 0.1 %
BILIRUB SERPL-MCNC: 0.5 MG/DL
BUN SERPL-MCNC: 16 MG/DL
CALCIUM SERPL-MCNC: 8.8 MG/DL
CHLORIDE SERPL-SCNC: 102 MMOL/L
CO2 SERPL-SCNC: 27 MMOL/L
CREAT SERPL-MCNC: 0.8 MG/DL
DIFFERENTIAL METHOD: ABNORMAL
EOSINOPHIL # BLD AUTO: 0 K/UL
EOSINOPHIL NFR BLD: 0 %
ERYTHROCYTE [DISTWIDTH] IN BLOOD BY AUTOMATED COUNT: 12.7 %
EST. GFR  (AFRICAN AMERICAN): >60 ML/MIN/1.73 M^2
EST. GFR  (NON AFRICAN AMERICAN): >60 ML/MIN/1.73 M^2
GLUCOSE SERPL-MCNC: 141 MG/DL
HCT VFR BLD AUTO: 43.8 %
HGB BLD-MCNC: 15.6 G/DL
LYMPHOCYTES # BLD AUTO: 1.2 K/UL
LYMPHOCYTES NFR BLD: 8.1 %
MBP CSF-MCNC: <2 MCG/L
MCH RBC QN AUTO: 31.1 PG
MCHC RBC AUTO-ENTMCNC: 35.6 G/DL
MCV RBC AUTO: 87 FL
MONOCYTES # BLD AUTO: 0.5 K/UL
MONOCYTES NFR BLD: 3.3 %
NEUTROPHILS # BLD AUTO: 13 K/UL
NEUTROPHILS NFR BLD: 88.5 %
PLATELET # BLD AUTO: 176 K/UL
PMV BLD AUTO: 10.2 FL
POCT GLUCOSE: 110 MG/DL (ref 70–110)
POCT GLUCOSE: 136 MG/DL (ref 70–110)
POCT GLUCOSE: 185 MG/DL (ref 70–110)
POTASSIUM SERPL-SCNC: 4.2 MMOL/L
PROT SERPL-MCNC: 6.3 G/DL
RBC # BLD AUTO: 5.02 M/UL
SODIUM SERPL-SCNC: 139 MMOL/L
WBC # BLD AUTO: 14.66 K/UL

## 2018-03-03 PROCEDURE — 25000003 PHARM REV CODE 250: Performed by: PSYCHIATRY & NEUROLOGY

## 2018-03-03 PROCEDURE — 36415 COLL VENOUS BLD VENIPUNCTURE: CPT

## 2018-03-03 PROCEDURE — 25000003 PHARM REV CODE 250: Performed by: NURSE PRACTITIONER

## 2018-03-03 PROCEDURE — 21400001 HC TELEMETRY ROOM

## 2018-03-03 PROCEDURE — 80053 COMPREHEN METABOLIC PANEL: CPT

## 2018-03-03 PROCEDURE — 97110 THERAPEUTIC EXERCISES: CPT

## 2018-03-03 PROCEDURE — 97116 GAIT TRAINING THERAPY: CPT

## 2018-03-03 PROCEDURE — 25000003 PHARM REV CODE 250: Performed by: PHYSICIAN ASSISTANT

## 2018-03-03 PROCEDURE — 96372 THER/PROPH/DIAG INJ SC/IM: CPT

## 2018-03-03 PROCEDURE — 63600175 PHARM REV CODE 636 W HCPCS: Performed by: NURSE PRACTITIONER

## 2018-03-03 PROCEDURE — 99233 SBSQ HOSP IP/OBS HIGH 50: CPT | Mod: ,,, | Performed by: PSYCHIATRY & NEUROLOGY

## 2018-03-03 PROCEDURE — S4991 NICOTINE PATCH NONLEGEND: HCPCS | Performed by: PHYSICIAN ASSISTANT

## 2018-03-03 PROCEDURE — 63600175 PHARM REV CODE 636 W HCPCS: Performed by: PSYCHIATRY & NEUROLOGY

## 2018-03-03 PROCEDURE — 85025 COMPLETE CBC W/AUTO DIFF WBC: CPT

## 2018-03-03 RX ORDER — HYDROCORTISONE ACETATE 25 MG/1
25 SUPPOSITORY RECTAL 2 TIMES DAILY
Status: DISCONTINUED | OUTPATIENT
Start: 2018-03-03 | End: 2018-03-04 | Stop reason: HOSPADM

## 2018-03-03 RX ADMIN — POLYETHYLENE GLYCOL 3350 17 G: 17 POWDER, FOR SOLUTION ORAL at 10:03

## 2018-03-03 RX ADMIN — PAROXETINE HYDROCHLORIDE 20 MG: 20 TABLET, FILM COATED ORAL at 10:03

## 2018-03-03 RX ADMIN — HYDROCODONE BITARTRATE AND ACETAMINOPHEN 1 TABLET: 10; 325 TABLET ORAL at 05:03

## 2018-03-03 RX ADMIN — DEXTROSE 500 MG: 50 INJECTION, SOLUTION INTRAVENOUS at 09:03

## 2018-03-03 RX ADMIN — HYDROCORTISONE ACETATE 25 MG: 25 SUPPOSITORY RECTAL at 08:03

## 2018-03-03 RX ADMIN — FAMOTIDINE 20 MG: 20 TABLET ORAL at 10:03

## 2018-03-03 RX ADMIN — HYDROCORTISONE ACETATE 25 MG: 25 SUPPOSITORY RECTAL at 09:03

## 2018-03-03 RX ADMIN — DEXTROSE 500 MG: 50 INJECTION, SOLUTION INTRAVENOUS at 10:03

## 2018-03-03 RX ADMIN — CLONAZEPAM 0.5 MG: 0.5 TABLET ORAL at 10:03

## 2018-03-03 RX ADMIN — HYDROCODONE BITARTRATE AND ACETAMINOPHEN 1 TABLET: 10; 325 TABLET ORAL at 08:03

## 2018-03-03 RX ADMIN — ENOXAPARIN SODIUM 40 MG: 100 INJECTION SUBCUTANEOUS at 06:03

## 2018-03-03 RX ADMIN — NICOTINE 1 PATCH: 21 PATCH, EXTENDED RELEASE TRANSDERMAL at 12:03

## 2018-03-03 RX ADMIN — FAMOTIDINE 20 MG: 20 TABLET ORAL at 08:03

## 2018-03-03 RX ADMIN — STANDARDIZED SENNA CONCENTRATE AND DOCUSATE SODIUM 1 TABLET: 8.6; 5 TABLET, FILM COATED ORAL at 09:03

## 2018-03-03 RX ADMIN — HYDROCODONE BITARTRATE AND ACETAMINOPHEN 1 TABLET: 10; 325 TABLET ORAL at 12:03

## 2018-03-03 RX ADMIN — CLONAZEPAM 0.5 MG: 0.5 TABLET ORAL at 08:03

## 2018-03-03 RX ADMIN — STANDARDIZED SENNA CONCENTRATE AND DOCUSATE SODIUM 1 TABLET: 8.6; 5 TABLET, FILM COATED ORAL at 08:03

## 2018-03-03 NOTE — PLAN OF CARE
Problem: Patient Care Overview  Goal: Plan of Care Review  Outcome: Ongoing (interventions implemented as appropriate)  Patient AAO and VSS. NSR on monitor.  IV solumedrol administered as ordered. Pain adequately managed with prn meds. Reports continued numbness/tingling in LUE and pain in LLE.  Remains free of injury. Fall precautions maintained with bed low and wheels locked. Chart review for 24 hours completed. Will continue to monitor till discharge.

## 2018-03-03 NOTE — PROGRESS NOTES
[facility] Hospital Medicine  Progress Note    Patient Name: Logan Haywood  MRN: [id number]  Patient Class: IP- Inpatient   Admission Date: 2/27/2018  Length of Stay: 4 days  Attending Physician: Fredy Garrido MD  Primary Care Provider: Primary Doctor No        Subjective:     Principal Problem:MS (multiple sclerosis)    HPI:  Mr Haywood is a 47 year old male with PMHx of tobacco use. Patient was seen in the Emergency Room on yesterday as well, however left AMA. He presented to [facility] Emergency Room with numbness and weakness to the left side of body. Associated symptoms include left side neck pain. He has numbness  to his left upper extremity as well as his anterior and posterior torso. He does not have numbness in his left lower extremity. Denies associated symptoms shortness of breath or chest pain. MRI done yesterday showed findings most concerning for demyelinating disease and multiple sclerosis. Patient underwent lumbar puncture today as well. He has been evaluated by Neurology today as well.     Hospital Course:  The pt was admitted with left sided weakness. MRI Brain and C-spine confirmed evidence of demyelinating disease. Care discussed with neurology. Pt underwent LP- MS CSF wok up still pending.  Pt placed on IV Steroids. Slow improvement of symptoms- also complain of right upper back pain. PT/OT recommended rehab versus OP therapy. Pt requests outpatient therapy.   +constipation -no other complaints  Denies SI    Interval History: Left sided weakness improved       Review of Systems   Constitutional: Positive for fatigue. Negative for appetite change, chills, diaphoresis and fever.   HENT: Negative for congestion, ear pain, mouth sores, sore throat and trouble swallowing.    Eyes: Negative for pain and visual disturbance.   Respiratory: Negative for cough, chest tightness and shortness of breath.    Cardiovascular: Negative for chest pain, palpitations and leg swelling.    Gastrointestinal: Positive for constipation. Negative for abdominal pain, diarrhea and nausea.   Endocrine: Negative for cold intolerance, heat intolerance, polydipsia and polyuria.   Genitourinary: Negative for dysuria, frequency and hematuria.   Musculoskeletal: Negative for arthralgias, back pain, myalgias and neck pain.   Skin: Negative for pallor, rash and wound.   Allergic/Immunologic: Negative for environmental allergies and immunocompromised state.   Neurological: Positive for weakness (left sided ) and numbness (left torso and upper extremity). Negative for dizziness, seizures, syncope and headaches.   Hematological: Negative for adenopathy. Does not bruise/bleed easily.   Psychiatric/Behavioral: Negative for agitation, confusion and sleep disturbance.     Objective:     Vital Signs (Most Recent):  Temp: 98.2 °F (36.8 °C) (03/03/18 1513)  Pulse: 66 (03/03/18 1513)  Resp: 18 (03/03/18 1513)  BP: 117/65 (03/03/18 1513)  SpO2: 96 % (03/03/18 1513) Vital Signs (24h Range):  Temp:  [96.6 °F (35.9 °C)-98.4 °F (36.9 °C)] 98.2 °F (36.8 °C)  Pulse:  [54-95] 66  Resp:  [16-18] 18  SpO2:  [92 %-96 %] 96 %  BP: (105-142)/(64-93) 117/65     Weight: 75.8 kg (167 lb 1.7 oz)  Body mass index is 28.68 kg/m².    Intake/Output Summary (Last 24 hours) at 03/03/18 1537  Last data filed at 03/03/18 0400   Gross per 24 hour   Intake              580 ml   Output                0 ml   Net              580 ml      Physical Exam   Constitutional: He is oriented to person, place, and time. He appears well-developed and well-nourished.   HENT:   Head: Normocephalic and atraumatic.   Eyes: Conjunctivae are normal.   Neck: Neck supple. No JVD present.   Cardiovascular: Normal rate, regular rhythm and normal heart sounds.    Pulmonary/Chest: Effort normal and breath sounds normal. He has no wheezes.   Abdominal: Soft. Bowel sounds are normal. He exhibits no distension. There is no tenderness.   Musculoskeletal: Normal range of motion.    Neurological: He is alert and oriented to person, place, and time.   4/5 strength to left upper and lower extremities. 5/5 strength in all other extremities.    Skin: Skin is warm and dry. No rash noted.   Psychiatric: He has a normal mood and affect. Thought content normal. He is not agitated.   Nursing note and vitals reviewed.    Significant Labs:   CBC:     Recent Labs  Lab 03/03/18  0454   WBC 14.66*   HGB 15.6   HCT 43.8        CMP:     Recent Labs  Lab 03/02/18  0501 03/03/18  0454    139   K 4.0 4.2    102   CO2 24 27   * 141*   BUN 12 16   CREATININE 0.8 0.8   CALCIUM 8.8 8.8   PROT 6.3 6.3   ALBUMIN 3.5 3.5   BILITOT 0.3 0.5   ALKPHOS 66 61   AST 10 10   ALT 22 24   ANIONGAP 11 10   EGFRNONAA >60 >60       Significant Imaging:   Imaging Results          FL Lumbar Puncture (xpd) (Final result)  Result time 02/27/18 15:00:15    Final result by Trudy Leger MD (02/27/18 15:00:15)                 Impression:      1. Successful fluoroscopically guided lumbar puncture.      Electronically signed by: TRUDY LEGER MD  Date:     02/27/18  Time:    15:00              Narrative:    Procedure:  Lumbar puncture, diagnostic    Clinical History:     Multiple sclerosis.    Technique/findings: After the risks, benefits and options of the planned procedure were explained to the patient in full detail, the patient expressed complete understanding and gave signed informed consent. procedure was performed by David Aguiar PA-C under the direct supervision of Dr. VASHTI Leger.  The left paraspinous space was localized with fluoroscopy. The skin was marked with indelible ink.  The skin overlying this area was prepped and draped in the usual sterile fashion. A timeout was performed. 1% lidocaine was used as a local anesthetic, and a 22 gauge needle were used to perform a lumbar puncture at L3-L4.  Blunt cc of clear CSF was removed with an opening pressure of 21 cm of water.  The needle was then  removed.  A sterile dressing was applied.  The patient tolerated the procedure well without complication, departing the fluoro suite  in unchanged condition.     Fluoroscopic time was  0.1 minutes  and 1 image was obtained.                                Assessment/Plan:      * MS (multiple sclerosis)    MRI and CSF MS profile consistent with MS  Cont IV steroids  PT/OT recommends outpatient therapy              Cocaine abuse    Counseled on cessation           Constipation    Cont Miralax and colace          Depression    He admits to hopelessness and wishing to die. Denies suicide ideation  Monitor   Cont Paxil            Tobacco abuse    Smoking cessation strongly encouraged  Nicotine patch           VTE Risk Mitigation         Ordered     enoxaparin injection 40 mg  Daily     Route:  Subcutaneous        02/28/18 1638     Medium Risk of VTE  Once      02/27/18 1331              Yoselyn Jorge NP  Department of Hospital Medicine   [facility]

## 2018-03-03 NOTE — PROGRESS NOTES
Progress note  Neurology      Neurology follow up:  For: New onset multiple sclerosis .    SUBJECTIVE:      HPI:     He is stable but c/o: 1. incoordination with. left hand , 2.  Balance issue on walking. 3. Weakness on the left side.  Which has improved in the left lower extremities. 4. Numbness on the left side.    History reviewed. No pertinent past medical history.  Past Surgical History:   Procedure Laterality Date    TONSILLECTOMY       Family History   Problem Relation Age of Onset    Hypertension Mother     COPD Mother     Heart disease Father     Diabetes Sister     Stroke Brother      Social History   Substance Use Topics    Smoking status: Current Every Day Smoker     Packs/day: 1.00     Types: Cigarettes    Smokeless tobacco: Not on file    Alcohol use Yes      Comment: seldom       Review of patient's allergies indicates:   Allergen Reactions    Cat/feline products     Chocolate flavor Itching      Patient Active Problem List   Diagnosis    Numbness and tingling    Tobacco abuse    MS (multiple sclerosis)    Depression    Constipation         Scheduled Meds:   clonazePAM  0.5 mg Oral BID    enoxaparin  40 mg Subcutaneous Daily    ergocalciferol  50,000 Units Oral Q7 Days    famotidine  20 mg Oral BID    hydrocortisone  25 mg Rectal BID    methylPREDNISolone (SOLU-Medrol) IVPB (doses > 250 mg)  500 mg Intravenous BID    nicotine  1 patch Transdermal Daily    paroxetine  20 mg Oral Daily    polyethylene glycol  17 g Oral Daily    senna-docusate 8.6-50 mg  1 tablet Oral BID     Continuous Infusions:  PRN Meds:acetaminophen, aluminum-magnesium hydroxide-simethicone, hydrocodone-acetaminophen 10-325mg, methocarbamol, ondansetron, promethazine (PHENERGAN) IVPB    Review of Systems   Constitutional: Negative for fever and weight loss.   HENT: Negative for ear pain, hearing loss and tinnitus.    Eyes: Negative for blurred vision, double vision, photophobia, pain, discharge and  redness.   Respiratory: Negative for cough and shortness of breath.    Cardiovascular: Negative for chest pain, palpitations, claudication and leg swelling.   Gastrointestinal: Negative for abdominal pain, heartburn, nausea and vomiting.   Genitourinary: Negative for dysuria, flank pain, frequency and urgency.   Musculoskeletal: Negative for back pain, falls, joint pain, myalgias and neck pain.   Skin: Negative for itching and rash.   Neurological: Positive for tingling, sensory change, focal weakness and headaches. Negative for dizziness, tremors, speech change, seizures, loss of consciousness and weakness.   Endo/Heme/Allergies: Does not bruise/bleed easily.   Psychiatric/Behavioral: Negative for depression, hallucinations, memory loss and suicidal ideas. The patient is not nervous/anxious and does not have insomnia.          OBJECTIVE:     Vital Signs (Most Recent)  Temp: 97.4 °F (36.3 °C) (03/03/18 0735)  Pulse: (!) 54 (03/03/18 0735)  Resp: 16 (03/03/18 0735)  BP: 105/64 (03/03/18 0735)  SpO2: 95 % (03/03/18 0735)    Vital Signs Range (Last 24H):  Temp:  [96.6 °F (35.9 °C)-98.4 °F (36.9 °C)]   Pulse:  [54-95]   Resp:  [16-18]   BP: (105-142)/(64-93)   SpO2:  [92 %-98 %]       Physical Exam   Constitutional: He is oriented to person, place, and time. He appears well-developed and well-nourished. No distress.   HENT:   Head: Normocephalic.   Right Ear: External ear normal.   Left Ear: External ear normal.   Mouth/Throat: Oropharynx is clear and moist.   Eyes: Conjunctivae and EOM are normal. Pupils are equal, round, and reactive to light.   Neck: Normal range of motion. Neck supple. No tracheal deviation present. No thyromegaly present.   Cardiovascular: Regular rhythm, normal heart sounds and intact distal pulses.    Pulmonary/Chest: Effort normal and breath sounds normal. No respiratory distress.   Abdominal: Soft. Bowel sounds are normal. There is no tenderness.   Musculoskeletal: He exhibits no edema or  tenderness.   Lymphadenopathy:     He has no cervical adenopathy.   Neurological: He is alert and oriented to person, place, and time. He has normal reflexes. He displays no tremor and normal reflexes. No cranial nerve deficit or sensory deficit. He exhibits normal muscle tone. Coordination normal. He displays no Babinski's sign on the right side. He displays no Babinski's sign on the left side.   Reflex Scores:       Tricep reflexes are 2+ on the right side and 2+ on the left side.       Bicep reflexes are 2+ on the right side and 2+ on the left side.       Brachioradialis reflexes are 2+ on the right side and 2+ on the left side.       Patellar reflexes are 2+ on the right side and 2+ on the left side.       Achilles reflexes are 2+ on the right side and 2+ on the left side.  Skin: Skin is warm and dry. No rash noted. He is not diaphoretic. No erythema. No pallor.   Psychiatric: He has a normal mood and affect. His behavior is normal. Judgment and thought content normal.       Strength  Deltoids Triceps Biceps Wrist Extension Wrist Flexion Hand    Upper: R 5/5 5/5 5/5 5/5 5/5 5/5    L 4/5 4+/5 4/5 4+/5 4+/5 4+/5     Iliopsoas Quadriceps Knee  Flexion Tibialis  anterior Gastro- cnemius EHL   Lower: R 5/5 5/5 5/5 5/5 5/5 5/5    L 4+/5 4+/5 4+/5 4+/5 5/5 5/5         Laboratory:  Lab Results   Component Value Date    WBC 14.66 (H) 03/03/2018    HGB 15.6 03/03/2018    HCT 43.8 03/03/2018     03/03/2018    ALT 24 03/03/2018    AST 10 03/03/2018     03/03/2018    K 4.2 03/03/2018     03/03/2018    CREATININE 0.8 03/03/2018    BUN 16 03/03/2018    CO2 27 03/03/2018    TSH 0.686 02/28/2018    INR 1.0 02/27/2018   Results for RODGER NGUYEN (MRN [id number]) as of 3/3/2018 11:23   Ref. Range 2/27/2018 14:53   Color, CSF Latest Ref Range: Colorless  Colorless   Heme [place] Latest Units: mL 5.0   Appearance, CSF Latest Ref Range: Clear  Clear   WBC, CSF Latest Ref Range: 0 - 5 /cu mm 2   RBC, CSF  "Latest Ref Range: 0 /cu mm 2 (A)   Glucose, CSF Latest Ref Range: 40 - 70 mg/dL 74 (H)   LDH, CSF Latest Units: U/L 26   Protein, CSF Latest Ref Range: 15 - 40 mg/dL 50 (H)   CSF Bands Latest Units: bands 4   Serum Bands Latest Units: bands 0   Olig Bands Interpretation, CSF Latest Ref Range: <4 bands 4 (A)   IgG Index, CSF Latest Ref Range: <=0.85  0.63   Albumin, CSF Latest Ref Range: <=27.0 mg/dL 26.4   IGG/ALBUMIN RATIO, CSF Latest Ref Range: <=0.21  0.12   IgG Synthetic Rate Latest Ref Range: <=12 mg/24 h 3.65   Albumin, Serum Latest Ref Range: 3200 - 4800 mg/dL 3740   IgG, CSF Latest Ref Range: <=8.1 mg/dL 3.1   Body Fluid Source, Refrigerated Unknown CSF       ASSESSMENT/PLAN:     Assessment:   Multiple sclerosis: now CSF came positive for multiple sclerosis . He is on steroid and last dose will be tomorrow. He can get his last dose early and can go home with PT/OT as out patient 3 times a week for a month. He can have Time off for a month . Medication instructions are given below. Please, follow the instruction.      Patient Active Problem List   Diagnosis    Numbness and tingling    Tobacco abuse    MS (multiple sclerosis)    Depression    Constipation         Plan:  1. Prednisone 20 mg tab              4 tab for  9 days.         "                         "                        3 tab for 3 days.         "                            "                     2 tab for 3 days.         "                        20 mg tab         1 tab for 3 days.         Than discontinue .  2. Ergocalciferol  50 ,000 unit , once in a week for 1 month.  3. Calcium Carbonate  2 tab 3 times a day for a month.  4. Diazepam 2 mg tab every 8 hours PRN for anxiety ( # 20 tab)  5. Pepcid 40 mg daily po  6. Neurology appointment. In 2 weeks.               "

## 2018-03-03 NOTE — PT/OT/SLP PROGRESS
Physical Therapy Treatment    Patient Name:  Logan Haywood   MRN:  [id number]    Recommendations:     Discharge Recommendations:      Discharge Equipment Recommendations: cane, straight   Barriers to discharge: None    Assessment:     Pt continues with decreased foot clearance during gait.  Decreased assistance required- SPV for last 100 ft of gait    Rehab Prognosis:  good; patient would benefit from acute skilled PT services to address these deficits and reach maximum level of function.      Recent Surgery: * No surgery found *      Plan:     During this hospitalization, patient to be seen 5 x/week to address the above listed problems via gait training, therapeutic activities, therapeutic exercises  · Plan of Care Expires:  03/08/18   Plan of Care Reviewed with: patient    Subjective     Communicated with nursing prior to session.  Patient found supine upon PT entry to room, agreeable to treatment.      Chief Complaint: decreased use of L hand  Patient comments/goals: able to go home  Pain/Comfort:  · Pain Rating 1: 0/10    Patients cultural, spiritual, Sikh conflicts given the current situation:      Objective:     Patient found with:       General Precautions: Standard, fall   Orthopedic Precautions:N/A   Braces:       Functional Mobility:  · Bed Mobility:     · Supine to Sit: independence  · Transfers:     · Sit to Stand:  independence with no AD  · Gait: 400 feet with CGA, 100 feet with SPV      AM-PAC 6 CLICK MOBILITY  Turning over in bed (including adjusting bedclothes, sheets and blankets)?: 4  Sitting down on and standing up from a chair with arms (e.g., wheelchair, bedside commode, etc.): 4  Moving from lying on back to sitting on the side of the bed?: 4  Moving to and from a bed to a chair (including a wheelchair)?: 3  Need to walk in hospital room?: 3  Climbing 3-5 steps with a railing?: 1  Total Score: 19       Therapeutic Activities and Exercises:   AMB with decreased foot clearance.  Able  to self correct without LOB for last 120 feet    Patient left supine with all lines intact and call button in reach.with nursing in room    GOALS:    Physical Therapy Goals        Problem: Physical Therapy Goal    Goal Priority Disciplines Outcome Goal Variances Interventions   Physical Therapy Goal     PT/OT, PT Ongoing (interventions implemented as appropriate)     Description:  Goals to be met by: 18     Patient will increase functional independence with mobility by performin. Sit to stand transfer with Supervision  2. Bed to chair transfer with Supervision, without LOB  3. Gait  2 x 150 feet with Supervision normal foot clearance on L, no LOB  4. Lower extremity exercise program x20 reps per handout, with supervision  5. Complete precision/repetition exercises with 80% accuracy                      Time Tracking:     PT Received On: 18  PT Start Time: 1005     PT Stop Time: 1030  PT Total Time (min): 25 min     Billable Minutes: Gait Training 30    Treatment Type: Treatment              Javy Ferrell, PT  2018

## 2018-03-03 NOTE — PLAN OF CARE
Problem: Physical Therapy Goal  Goal: Physical Therapy Goal  Goals to be met by: 18     Patient will increase functional independence with mobility by performin. Sit to stand transfer with Supervision  2. Bed to chair transfer with Supervision, without LOB  3. Gait  2 x 150 feet with Supervision normal foot clearance on L, no LOB  4. Lower extremity exercise program x20 reps per handout, with supervision  5. Complete precision/repetition exercises with 80% accuracy     Outcome: Ongoing (interventions implemented as appropriate)  Pt amb 400 feet without AD with CGA.  Amb 120 feet with SPV. Cont with decreased foot clearance

## 2018-03-03 NOTE — PT/OT/SLP PROGRESS
"Occupational Therapy  Treatment    Logan Haywood   MRN: [id number]   Admitting Diagnosis: MS (multiple sclerosis)    OT Date of Treatment: 03/03/18   OT Start Time: 0950  OT Stop Time: 1010  OT Total Time (min): 20 min    Billable Minutes:  Therapeutic Exercise  x 20 min    General Precautions: Standard, fall  Orthopedic Precautions: N/A  Braces: N/A         Subjective:  Communicated with pt and nurse- Shaila prior to session.    Pain/Comfort  Pain Rating 1: 0/10    Objective:  Patient found with: telemetry     Functional Mobility:  Bed Mobility:       Transfers:        Functional Ambulation:     Activities of Daily Living:     Feeding adaptive equipment:      UE adaptive equipment:      LE adaptive equipment:                     Bathing adaptive equipment:     Balance:   Static Sit: NORMAL: No deviations seen in posture held statically  Dynamic Sit: GOOD: Maintains balance through MODERATE excursions of active trunk movement  Static Stand:   Dynamic stand:     Therapeutic Activities and Exercises:  Pt seen in room, completed therapeutic ex to BUE via red theraband ex  2 x 10 reps to increase fx strength to impact self care performance. Pt tolerated tx well.     AM-PAC 6 CLICK ADL   How much help from another person does this patient currently need?   1 = Unable, Total/Dependent Assistance  2 = A lot, Maximum/Moderate Assistance  3 = A little, Minimum/Contact Guard/Supervision  4 = None, Modified [place]/Independent    Putting on and taking off regular lower body clothing? : 3  Bathing (including washing, rinsing, drying)?: 3  Toileting, which includes using toilet, bedpan, or urinal? : 3  Putting on and taking off regular upper body clothing?: 3  Taking care of personal grooming such as brushing teeth?: 3  Eating meals?: 3  Total Score: 18     AM-PAC Raw Score CMS "G-Code Modifier Level of Impairment Assistance   6 % Total / Unable   7 - 8 CM 80 - 100% Maximal Assist   9-13 CL 60 - 80% Moderate Assist "   14 - 19 CK 40 - 60% Moderate Assist   20 - 22 CJ 20 - 40% Minimal Assist   23 CI 1-20% SBA / CGA   24 CH 0% Independent/ Mod I       Patient left supine with all lines intact and call button in reach    ASSESSMENT:  Logan Haywood is a 47 y.o. male with a medical diagnosis of MS (multiple sclerosis) and presents with impaired coordination and strength to LUE.    Rehab identified problem list/impairments: Rehab identified problem list/impairments: weakness, impaired self care skills, decreased coordination, decreased upper extremity function    Rehab potential is good.    Activity tolerance: Good    Discharge recommendations:       Barriers to discharge: Barriers to Discharge: None    Equipment recommendations: none     GOALS:    Occupational Therapy Goals        Problem: Occupational Therapy Goal    Goal Priority Disciplines Outcome Interventions   Occupational Therapy Goal     OT, PT/OT Ongoing (interventions implemented as appropriate)    Description:  ot goals to be met by 3-8-18  1. s with ue dressing  2. s with le dressing  3. Pt will tolerate  1 set x 15 reps b ue rom exercise with min resistance  4. Pt will perform fm coordination activity with min rewsistance                      Plan:  Patient to be seen 3 x/week to address the above listed problems via self-care/home management, therapeutic activities, therapeutic exercises  Plan of Care expires:    Plan of Care reviewed with: patient         Josejorge alberto Moreno, OT  03/03/2018

## 2018-03-03 NOTE — SUBJECTIVE & OBJECTIVE
Interval History: Left sided weakness improved       Review of Systems   Constitutional: Positive for fatigue. Negative for appetite change, chills, diaphoresis and fever.   HENT: Negative for congestion, ear pain, mouth sores, sore throat and trouble swallowing.    Eyes: Negative for pain and visual disturbance.   Respiratory: Negative for cough, chest tightness and shortness of breath.    Cardiovascular: Negative for chest pain, palpitations and leg swelling.   Gastrointestinal: Positive for constipation. Negative for abdominal pain, diarrhea and nausea.   Endocrine: Negative for cold intolerance, heat intolerance, polydipsia and polyuria.   Genitourinary: Negative for dysuria, frequency and hematuria.   Musculoskeletal: Negative for arthralgias, back pain, myalgias and neck pain.   Skin: Negative for pallor, rash and wound.   Allergic/Immunologic: Negative for environmental allergies and immunocompromised state.   Neurological: Positive for weakness (left sided ) and numbness (left torso and upper extremity). Negative for dizziness, seizures, syncope and headaches.   Hematological: Negative for adenopathy. Does not bruise/bleed easily.   Psychiatric/Behavioral: Negative for agitation, confusion and sleep disturbance.     Objective:     Vital Signs (Most Recent):  Temp: 98.2 °F (36.8 °C) (03/03/18 1513)  Pulse: 66 (03/03/18 1513)  Resp: 18 (03/03/18 1513)  BP: 117/65 (03/03/18 1513)  SpO2: 96 % (03/03/18 1513) Vital Signs (24h Range):  Temp:  [96.6 °F (35.9 °C)-98.4 °F (36.9 °C)] 98.2 °F (36.8 °C)  Pulse:  [54-95] 66  Resp:  [16-18] 18  SpO2:  [92 %-96 %] 96 %  BP: (105-142)/(64-93) 117/65     Weight: 75.8 kg (167 lb 1.7 oz)  Body mass index is 28.68 kg/m².    Intake/Output Summary (Last 24 hours) at 03/03/18 1537  Last data filed at 03/03/18 0400   Gross per 24 hour   Intake              580 ml   Output                0 ml   Net              580 ml      Physical Exam   Constitutional: He is oriented to person,  place, and time. He appears well-developed and well-nourished.   HENT:   Head: Normocephalic and atraumatic.   Eyes: Conjunctivae are normal.   Neck: Neck supple. No JVD present.   Cardiovascular: Normal rate, regular rhythm and normal heart sounds.    Pulmonary/Chest: Effort normal and breath sounds normal. He has no wheezes.   Abdominal: Soft. Bowel sounds are normal. He exhibits no distension. There is no tenderness.   Musculoskeletal: Normal range of motion.   Neurological: He is alert and oriented to person, place, and time.   4/5 strength to left upper and lower extremities. 5/5 strength in all other extremities.    Skin: Skin is warm and dry. No rash noted.   Psychiatric: He has a normal mood and affect. Thought content normal. He is not agitated.   Nursing note and vitals reviewed.    Significant Labs:   CBC:     Recent Labs  Lab 03/03/18  0454   WBC 14.66*   HGB 15.6   HCT 43.8        CMP:     Recent Labs  Lab 03/02/18  0501 03/03/18  0454    139   K 4.0 4.2    102   CO2 24 27   * 141*   BUN 12 16   CREATININE 0.8 0.8   CALCIUM 8.8 8.8   PROT 6.3 6.3   ALBUMIN 3.5 3.5   BILITOT 0.3 0.5   ALKPHOS 66 61   AST 10 10   ALT 22 24   ANIONGAP 11 10   EGFRNONAA >60 >60       Significant Imaging:   Imaging Results          FL Lumbar Puncture (xpd) (Final result)  Result time 02/27/18 15:00:15    Final result by Trudy Leger MD (02/27/18 15:00:15)                 Impression:      1. Successful fluoroscopically guided lumbar puncture.      Electronically signed by: TRUDY LEGER MD  Date:     02/27/18  Time:    15:00              Narrative:    Procedure:  Lumbar puncture, diagnostic    Clinical History:     Multiple sclerosis.    Technique/findings: After the risks, benefits and options of the planned procedure were explained to the patient in full detail, the patient expressed complete understanding and gave signed informed consent. procedure was performed by David Aguiar PA-C under the  direct supervision of Dr. VASHTI Leger.  The left paraspinous space was localized with fluoroscopy. The skin was marked with indelible ink.  The skin overlying this area was prepped and draped in the usual sterile fashion. A timeout was performed. 1% lidocaine was used as a local anesthetic, and a 22 gauge needle were used to perform a lumbar puncture at L3-L4.  Blunt cc of clear CSF was removed with an opening pressure of 21 cm of water.  The needle was then removed.  A sterile dressing was applied.  The patient tolerated the procedure well without complication, departing the fluoro suite  in unchanged condition.     Fluoroscopic time was  0.1 minutes  and 1 image was obtained.

## 2018-03-03 NOTE — ASSESSMENT & PLAN NOTE
MRI and CSF MS profile consistent with MS  Cont IV steroids  PT/OT recommends outpatient therapy

## 2018-03-03 NOTE — PLAN OF CARE
Problem: Patient Care Overview  Goal: Plan of Care Review  Outcome: Ongoing (interventions implemented as appropriate)  Remained free from injury. Tolerating diet. Pain controlled with po medication. Blood glucose monitoring WNL. Ambulating independently. Patient reminded that this is a non-smoking facility. Patient informed that he will have to leave AMA if he leaves the floor to smoke. Normal sinus- sinus tach on the monitor. 12 hour chart check complete.

## 2018-03-04 VITALS
DIASTOLIC BLOOD PRESSURE: 85 MMHG | BODY MASS INDEX: 28.53 KG/M2 | TEMPERATURE: 98 F | WEIGHT: 167.13 LBS | HEIGHT: 64 IN | HEART RATE: 113 BPM | OXYGEN SATURATION: 95 % | SYSTOLIC BLOOD PRESSURE: 131 MMHG | RESPIRATION RATE: 20 BRPM

## 2018-03-04 LAB
ALBUMIN SERPL BCP-MCNC: 3.4 G/DL
ALP SERPL-CCNC: 59 U/L
ALT SERPL W/O P-5'-P-CCNC: 21 U/L
ANION GAP SERPL CALC-SCNC: 11 MMOL/L
AST SERPL-CCNC: 10 U/L
BASOPHILS # BLD AUTO: 0.01 K/UL
BASOPHILS NFR BLD: 0.1 %
BILIRUB SERPL-MCNC: 0.5 MG/DL
BUN SERPL-MCNC: 19 MG/DL
CALCIUM SERPL-MCNC: 8.6 MG/DL
CHLORIDE SERPL-SCNC: 101 MMOL/L
CMV SPEC QL SHELL VIAL CULT: NO GROWTH
CO2 SERPL-SCNC: 25 MMOL/L
CREAT SERPL-MCNC: 0.7 MG/DL
DIFFERENTIAL METHOD: ABNORMAL
EOSINOPHIL # BLD AUTO: 0 K/UL
EOSINOPHIL NFR BLD: 0 %
ERYTHROCYTE [DISTWIDTH] IN BLOOD BY AUTOMATED COUNT: 12.5 %
EST. GFR  (AFRICAN AMERICAN): >60 ML/MIN/1.73 M^2
EST. GFR  (NON AFRICAN AMERICAN): >60 ML/MIN/1.73 M^2
GLUCOSE SERPL-MCNC: 135 MG/DL
GRAM STN SPEC: NORMAL
GRAM STN SPEC: NORMAL
HCT VFR BLD AUTO: 44 %
HGB BLD-MCNC: 15.8 G/DL
LYMPHOCYTES # BLD AUTO: 1.2 K/UL
LYMPHOCYTES NFR BLD: 10.1 %
MCH RBC QN AUTO: 31.1 PG
MCHC RBC AUTO-ENTMCNC: 35.9 G/DL
MCV RBC AUTO: 87 FL
MONOCYTES # BLD AUTO: 0.4 K/UL
MONOCYTES NFR BLD: 3 %
NEUTROPHILS # BLD AUTO: 10.2 K/UL
NEUTROPHILS NFR BLD: 86.8 %
PLATELET # BLD AUTO: 195 K/UL
PMV BLD AUTO: 10.6 FL
POCT GLUCOSE: 108 MG/DL (ref 70–110)
POTASSIUM SERPL-SCNC: 4.1 MMOL/L
PROT SERPL-MCNC: 6.2 G/DL
RBC # BLD AUTO: 5.08 M/UL
SODIUM SERPL-SCNC: 137 MMOL/L
WBC # BLD AUTO: 11.79 K/UL

## 2018-03-04 PROCEDURE — 25000003 PHARM REV CODE 250: Performed by: PSYCHIATRY & NEUROLOGY

## 2018-03-04 PROCEDURE — 80053 COMPREHEN METABOLIC PANEL: CPT

## 2018-03-04 PROCEDURE — 25000003 PHARM REV CODE 250: Performed by: NURSE PRACTITIONER

## 2018-03-04 PROCEDURE — 63600175 PHARM REV CODE 636 W HCPCS: Performed by: PSYCHIATRY & NEUROLOGY

## 2018-03-04 PROCEDURE — 25000003 PHARM REV CODE 250: Performed by: PHYSICIAN ASSISTANT

## 2018-03-04 PROCEDURE — 99233 SBSQ HOSP IP/OBS HIGH 50: CPT | Mod: ,,, | Performed by: PSYCHIATRY & NEUROLOGY

## 2018-03-04 PROCEDURE — 36415 COLL VENOUS BLD VENIPUNCTURE: CPT

## 2018-03-04 PROCEDURE — 97530 THERAPEUTIC ACTIVITIES: CPT

## 2018-03-04 PROCEDURE — 85025 COMPLETE CBC W/AUTO DIFF WBC: CPT

## 2018-03-04 PROCEDURE — 97112 NEUROMUSCULAR REEDUCATION: CPT

## 2018-03-04 PROCEDURE — 96367 TX/PROPH/DG ADDL SEQ IV INF: CPT

## 2018-03-04 PROCEDURE — 96365 THER/PROPH/DIAG IV INF INIT: CPT

## 2018-03-04 RX ORDER — ERGOCALCIFEROL 1.25 MG/1
50000 CAPSULE ORAL
Qty: 4 CAPSULE | Refills: 0 | Status: SHIPPED | OUTPATIENT
Start: 2018-03-07 | End: 2018-09-05

## 2018-03-04 RX ORDER — CALCIUM CARBONATE 500(1250)
1 TABLET ORAL DAILY
Qty: 30 TABLET | Refills: 0 | Status: SHIPPED | OUTPATIENT
Start: 2018-03-04 | End: 2018-03-04

## 2018-03-04 RX ORDER — POLYETHYLENE GLYCOL 3350 17 G/17G
17 POWDER, FOR SOLUTION ORAL DAILY
Qty: 30 PACKET | Refills: 0 | Status: SHIPPED | OUTPATIENT
Start: 2018-03-05 | End: 2018-06-14

## 2018-03-04 RX ORDER — PAROXETINE HYDROCHLORIDE 20 MG/1
20 TABLET, FILM COATED ORAL DAILY
Qty: 30 TABLET | Refills: 0 | Status: SHIPPED | OUTPATIENT
Start: 2018-03-05 | End: 2018-03-04

## 2018-03-04 RX ORDER — AMOXICILLIN 250 MG
1 CAPSULE ORAL 2 TIMES DAILY
Qty: 60 TABLET | Refills: 0 | Status: SHIPPED | OUTPATIENT
Start: 2018-03-04 | End: 2018-03-04

## 2018-03-04 RX ORDER — CLONAZEPAM 0.5 MG/1
0.5 TABLET ORAL NIGHTLY
Qty: 30 TABLET | Refills: 0 | Status: SHIPPED | OUTPATIENT
Start: 2018-03-04 | End: 2018-03-19 | Stop reason: SDUPTHER

## 2018-03-04 RX ORDER — PAROXETINE HYDROCHLORIDE 20 MG/1
20 TABLET, FILM COATED ORAL DAILY
Qty: 30 TABLET | Refills: 0 | Status: SHIPPED | OUTPATIENT
Start: 2018-03-05 | End: 2018-06-14

## 2018-03-04 RX ORDER — AMOXICILLIN 250 MG
1 CAPSULE ORAL 2 TIMES DAILY
Qty: 60 TABLET | Refills: 0 | Status: SHIPPED | OUTPATIENT
Start: 2018-03-04 | End: 2018-06-14

## 2018-03-04 RX ORDER — FAMOTIDINE 20 MG/1
20 TABLET, FILM COATED ORAL 2 TIMES DAILY
Qty: 60 TABLET | Refills: 0 | Status: SHIPPED | OUTPATIENT
Start: 2018-03-04 | End: 2018-03-04

## 2018-03-04 RX ORDER — POLYETHYLENE GLYCOL 3350 17 G/17G
17 POWDER, FOR SOLUTION ORAL DAILY
Qty: 30 PACKET | Refills: 0 | Status: SHIPPED | OUTPATIENT
Start: 2018-03-05 | End: 2018-03-04

## 2018-03-04 RX ORDER — CALCIUM CARBONATE 500(1250)
1 TABLET ORAL DAILY
Qty: 30 TABLET | Refills: 0 | Status: SHIPPED | OUTPATIENT
Start: 2018-03-04 | End: 2018-09-05

## 2018-03-04 RX ORDER — ERGOCALCIFEROL 1.25 MG/1
50000 CAPSULE ORAL
Qty: 4 CAPSULE | Refills: 0 | Status: SHIPPED | OUTPATIENT
Start: 2018-03-07 | End: 2018-03-04

## 2018-03-04 RX ORDER — PREDNISONE 20 MG/1
TABLET ORAL
Qty: 54 TABLET | Refills: 0 | Status: SHIPPED | OUTPATIENT
Start: 2018-03-04 | End: 2018-04-10

## 2018-03-04 RX ORDER — PREDNISONE 20 MG/1
TABLET ORAL
Qty: 54 TABLET | Refills: 0 | Status: SHIPPED | OUTPATIENT
Start: 2018-03-04 | End: 2018-03-04

## 2018-03-04 RX ORDER — FAMOTIDINE 20 MG/1
20 TABLET, FILM COATED ORAL 2 TIMES DAILY
Qty: 60 TABLET | Refills: 0 | Status: SHIPPED | OUTPATIENT
Start: 2018-03-04 | End: 2018-06-14

## 2018-03-04 RX ADMIN — STANDARDIZED SENNA CONCENTRATE AND DOCUSATE SODIUM 1 TABLET: 8.6; 5 TABLET, FILM COATED ORAL at 08:03

## 2018-03-04 RX ADMIN — HYDROCODONE BITARTRATE AND ACETAMINOPHEN 1 TABLET: 10; 325 TABLET ORAL at 03:03

## 2018-03-04 RX ADMIN — DEXTROSE 500 MG: 50 INJECTION, SOLUTION INTRAVENOUS at 08:03

## 2018-03-04 RX ADMIN — POLYETHYLENE GLYCOL 3350 17 G: 17 POWDER, FOR SOLUTION ORAL at 08:03

## 2018-03-04 RX ADMIN — PAROXETINE HYDROCHLORIDE 20 MG: 20 TABLET, FILM COATED ORAL at 08:03

## 2018-03-04 RX ADMIN — FAMOTIDINE 20 MG: 20 TABLET ORAL at 08:03

## 2018-03-04 RX ADMIN — HYDROCODONE BITARTRATE AND ACETAMINOPHEN 1 TABLET: 10; 325 TABLET ORAL at 10:03

## 2018-03-04 RX ADMIN — CLONAZEPAM 0.5 MG: 0.5 TABLET ORAL at 08:03

## 2018-03-04 RX ADMIN — HYDROCORTISONE ACETATE 25 MG: 25 SUPPOSITORY RECTAL at 08:03

## 2018-03-04 NOTE — PROGRESS NOTES
Progress note  Neurology      Neurology follow up:  For: New onset multiple sclerosis .    SUBJECTIVE:      HPI:     He is stable but c/o: 1. incoordination with. left hand , 2.  Balance issue on walking. 3. Weakness on the left side.  Which has improved in the left lower extremities. 4. Numbness on the left side.  He has several questions on the discharge plan.     History reviewed. No pertinent past medical history.  Past Surgical History:   Procedure Laterality Date    TONSILLECTOMY       Family History   Problem Relation Age of Onset    Hypertension Mother     COPD Mother     Heart disease Father     Diabetes Sister     Stroke Brother      Social History   Substance Use Topics    Smoking status: Current Every Day Smoker     Packs/day: 1.00     Types: Cigarettes    Smokeless tobacco: Not on file    Alcohol use Yes      Comment: seldom       Review of patient's allergies indicates:   Allergen Reactions    Cat/feline products     Chocolate flavor Itching      Patient Active Problem List   Diagnosis    Numbness and tingling    Tobacco abuse    MS (multiple sclerosis)    Depression    Constipation    Cocaine abuse         Scheduled Meds:   clonazePAM  0.5 mg Oral BID    enoxaparin  40 mg Subcutaneous Daily    ergocalciferol  50,000 Units Oral Q7 Days    famotidine  20 mg Oral BID    hydrocortisone  25 mg Rectal BID    nicotine  1 patch Transdermal Daily    paroxetine  20 mg Oral Daily    polyethylene glycol  17 g Oral Daily    senna-docusate 8.6-50 mg  1 tablet Oral BID     Continuous Infusions:  PRN Meds:acetaminophen, aluminum-magnesium hydroxide-simethicone, hydrocodone-acetaminophen 10-325mg, methocarbamol, ondansetron, promethazine (PHENERGAN) IVPB    Review of Systems   Constitutional: Negative for fever and weight loss.   HENT: Negative for ear pain, hearing loss and tinnitus.    Eyes: Negative for blurred vision, double vision, photophobia, pain, discharge and redness.    Respiratory: Negative for cough and shortness of breath.    Cardiovascular: Negative for chest pain, palpitations, claudication and leg swelling.   Gastrointestinal: Negative for abdominal pain, heartburn, nausea and vomiting.   Genitourinary: Negative for dysuria, flank pain, frequency and urgency.   Musculoskeletal: Negative for back pain, falls, joint pain, myalgias and neck pain.   Skin: Negative for itching and rash.   Neurological: Positive for tingling, sensory change, focal weakness and headaches. Negative for dizziness, tremors, speech change, seizures, loss of consciousness and weakness.   Endo/Heme/Allergies: Does not bruise/bleed easily.   Psychiatric/Behavioral: Negative for depression, hallucinations, memory loss and suicidal ideas. The patient is not nervous/anxious and does not have insomnia.          OBJECTIVE:     Vital Signs (Most Recent)  Temp: 97.4 °F (36.3 °C) (03/04/18 0737)  Pulse: 67 (03/04/18 0737)  Resp: 14 (03/04/18 0737)  BP: 117/79 (03/04/18 0737)  SpO2: 97 % (03/04/18 0737)    Vital Signs Range (Last 24H):  Temp:  [97.4 °F (36.3 °C)-98.2 °F (36.8 °C)]   Pulse:  []   Resp:  [14-18]   BP: (113-136)/(65-93)   SpO2:  [94 %-98 %]       Physical Exam   Constitutional: He is oriented to person, place, and time. He appears well-developed and well-nourished. No distress.   HENT:   Head: Normocephalic.   Right Ear: External ear normal.   Left Ear: External ear normal.   Mouth/Throat: Oropharynx is clear and moist.   Eyes: Conjunctivae and EOM are normal. Pupils are equal, round, and reactive to light.   Neck: Normal range of motion. Neck supple. No tracheal deviation present. No thyromegaly present.   Cardiovascular: Regular rhythm, normal heart sounds and intact distal pulses.    Pulmonary/Chest: Effort normal and breath sounds normal. No respiratory distress.   Abdominal: Soft. Bowel sounds are normal. There is no tenderness.   Musculoskeletal: He exhibits no edema or tenderness.    Lymphadenopathy:     He has no cervical adenopathy.   Neurological: He is alert and oriented to person, place, and time. He has normal reflexes. He displays no tremor and normal reflexes. No cranial nerve deficit or sensory deficit. He exhibits normal muscle tone. Coordination normal. He displays no Babinski's sign on the right side. He displays no Babinski's sign on the left side.   Reflex Scores:       Tricep reflexes are 2+ on the right side and 2+ on the left side.       Bicep reflexes are 2+ on the right side and 2+ on the left side.       Brachioradialis reflexes are 2+ on the right side and 2+ on the left side.       Patellar reflexes are 2+ on the right side and 2+ on the left side.       Achilles reflexes are 2+ on the right side and 2+ on the left side.  Skin: Skin is warm and dry. No rash noted. He is not diaphoretic. No erythema. No pallor.   Psychiatric: He has a normal mood and affect. His behavior is normal. Judgment and thought content normal.       Strength  Deltoids Triceps Biceps Wrist Extension Wrist Flexion Hand    Upper: R 5/5 5/5 5/5 5/5 5/5 5/5    L 4/5 4+/5 4/5 4+/5 4+/5 4+/5     Iliopsoas Quadriceps Knee  Flexion Tibialis  anterior Gastro- cnemius EHL   Lower: R 5/5 5/5 5/5 5/5 5/5 5/5    L 4+/5 4+/5 4+/5 4+/5 5/5 5/5         Laboratory:  Lab Results   Component Value Date    WBC 11.79 03/04/2018    HGB 15.8 03/04/2018    HCT 44.0 03/04/2018     03/04/2018    ALT 21 03/04/2018    AST 10 03/04/2018     03/04/2018    K 4.1 03/04/2018     03/04/2018    CREATININE 0.7 03/04/2018    BUN 19 03/04/2018    CO2 25 03/04/2018    TSH 0.686 02/28/2018    INR 1.0 02/27/2018   Results for RODGER NGUYEN (MRN [id number]) as of 3/3/2018 11:23   Ref. Range 2/27/2018 14:53   Color, CSF Latest Ref Range: Colorless  Colorless   Heme Aliquot Latest Units: mL 5.0   Appearance, CSF Latest Ref Range: Clear  Clear   WBC, CSF Latest Ref Range: 0 - 5 /cu mm 2   RBC, CSF Latest Ref Range:  "0 /cu mm 2 (A)   Glucose, CSF Latest Ref Range: 40 - 70 mg/dL 74 (H)   LDH, CSF Latest Units: U/L 26   Protein, CSF Latest Ref Range: 15 - 40 mg/dL 50 (H)   CSF Bands Latest Units: bands 4   Serum Bands Latest Units: bands 0   Olig Bands Interpretation, CSF Latest Ref Range: <4 bands 4 (A)   IgG Index, CSF Latest Ref Range: <=0.85  0.63   Albumin, CSF Latest Ref Range: <=27.0 mg/dL 26.4   IGG/ALBUMIN RATIO, CSF Latest Ref Range: <=0.21  0.12   IgG Synthetic Rate Latest Ref Range: <=12 mg/24 h 3.65   Albumin, Serum Latest Ref Range: 3200 - 4800 mg/dL 3740   IgG, CSF Latest Ref Range: <=8.1 mg/dL 3.1   Body Fluid Source, Refrigerated Unknown CSF       ASSESSMENT/PLAN:     Assessment:   Multiple sclerosis: now CSF came positive for multiple sclerosis . He is on steroid and last dose will be tomorrow. He can get his last dose early and can go home with PT/OT as out patient 3 times a week for a month. He can have Time off for a month . Medication instructions are given below. Please, follow the instruction.      Patient Active Problem List   Diagnosis    Numbness and tingling    Tobacco abuse    MS (multiple sclerosis)    Depression    Constipation    Cocaine abuse         Plan:  Discussed with charge nurse regarding discharge plan and instruction.  1. Prednisone 20 mg tab              4 tab for  9 days.         "                         "                        3 tab for 3 days.         "                            "                     2 tab for 3 days.         "                        20 mg tab         1 tab for 3 days.         Than discontinue .  2. Ergocalciferol  50 ,000 unit , once in a week for 1 month.  3. Calcium Carbonate  2 tab 3 times a day for a month.  4. Diazepam 2 mg tab every 8 hours PRN for anxiety ( # 20 tab)  5. Pepcid 40 mg daily po  6. Neurology appointment. In 2 weeks.               "

## 2018-03-04 NOTE — PROGRESS NOTES
Discharge instructions given. Patient educated to call [facility] for outpatient services and printed out OT and PT info. Printed out prescription for clonopin. IV removed and intact. Heart monitor removed and sent to telemetry. Patient voiced no other concerns. Patient discharged.

## 2018-03-04 NOTE — PLAN OF CARE
Problem: Physical Therapy Goal  Goal: Physical Therapy Goal  Goals to be met by: 18     Patient will increase functional independence with mobility by performin. Sit to stand transfer with Supervision  2. Bed to chair transfer with Supervision, without LOB  3. Gait  2 x 150 feet with Supervision normal foot clearance on L, no LOB  4. Lower extremity exercise program x20 reps per handout, with supervision  5. Complete precision/repetition exercises with 80% accuracy     Outcome: Ongoing (interventions implemented as appropriate)  Patient progressing toward all goals.

## 2018-03-04 NOTE — CONSULTS
Patient provided with copy of ambulatory referrals for OP/PT and with number [phone] to schedule appointment.   Tahmina Acosta LMSW, ACMARGARITO-Joce, CCM  3/4/2018

## 2018-03-04 NOTE — PLAN OF CARE
Problem: Patient Care Overview  Goal: Plan of Care Review  Outcome: Ongoing (interventions implemented as appropriate)  Remained free from injury. Tolerating diet. Ambulating independently. Pain controlled with po medication. Ambulating independently. Fiance at bedside. 12 hour chart check complete.

## 2018-03-04 NOTE — PROGRESS NOTES
3/4/2018    Patient: Logan Haywood    YOB: 1970    To whom it may concern,    Logan Haywood was admitted to the hospital on 2/27/2018 and was discharged on 3/4/2018. Patient was under my care at the hospital and is cleared to return to work on April 2, 2018 with no known restrictions at this time. If you have any questions or concerns, please don't hesitate to contact the department of hospital medicine at [facility] [phone].     Sincerely,    Fredy Garrido MD/Yoselyn Jorge NP    Department of Hospital Medicine

## 2018-03-04 NOTE — DISCHARGE SUMMARY
[facility] Hospital Medicine  Discharge Summary      Patient Name: Logan Haywood  MRN: [id number]  Admission Date: 2/27/2018  Hospital Length of Stay: 5 days  Discharge Date and Time:  03/04/2018 4:40 PM  Attending Physician: Dr. Garrido   Discharging Provider: Yoselyn Jorge NP  Primary Care Provider: Primary Doctor No      HPI:   Mr Haywood is a 47 year old male with PMHx of tobacco use. Patient was seen in the Emergency Room on yesterday as well, however left AMA. He presented to [facility] Emergency Room with numbness and weakness to the left side of body. Associated symptoms include left side neck pain. He has numbness  to his left upper extremity as well as his anterior and posterior torso. He does not have numbness in his left lower extremity. Denies associated symptoms shortness of breath or chest pain. MRI done yesterday showed findings most concerning for demyelinating disease and multiple sclerosis. Patient underwent lumbar puncture today as well. He has been evaluated by Neurology today as well.     * No surgery found *      Hospital Course:   The pt was admitted with left sided weakness. MRI Brain and C-spine confirmed evidence of demyelinating disease. Care discussed with neurology. Pt underwent LP- MS CSF profile also confirmed MS.  Pt placed on high dose IV Steroids. Slow improvement of symptoms- also complaiedn of right upper back pain. PT/OT evaluated pt. Pt requested outpatient therapy. Pt verbalized depression-Denies SI. Pt placed on paxil.   Pt discharged on Prednisone taper,ergocalciferol, calcium carbonate, pepcid, Klonopin as recommended by Neurology. Pt will follow up with neurology in 2 weeks. Pt was extensively counseled on tobacco and cocaine cessation. He verbalized understanding.      Consults:   Consults         Status Ordering Provider     Inpatient consult to Neurology  Once     Provider:  Genny Baeza MD    Completed YAMIL POPE     Inpatient consult to  Social Work  Once     Provider:  (Not yet assigned)    Completed KEY MALCOLM     Inpatient consult to Social Work  Once     Provider:  (Not yet assigned)    Completed MEÑO TORRES     IP consult to case management  Once     Provider:  (Not yet assigned)    Completed KEY MALCOLM            Final Active Diagnoses:    Diagnosis Date Noted POA    PRINCIPAL PROBLEM:  MS (multiple sclerosis) [G35] 02/27/2018 Yes    Cocaine abuse [F14.10] 03/03/2018 Yes    Constipation [K59.00] 03/02/2018 Yes    Depression [F32.9] 02/27/2018 Unknown    Tobacco abuse [Z72.0] 02/26/2018 Yes      Problems Resolved During this Admission:    Diagnosis Date Noted Date Resolved POA       Discharged Condition: stable    Disposition: Home or Self Care    Follow Up:  Follow-up Information     Angel Simon MD. Go in 3 days.    Specialty:  Family Medicine  Why:  appt scheduled  Contact information:  [address] [facility] [address] [phone]             Genny Baeza MD In 2 weeks.    Specialty:  Neurology  Why:  message sent to staff to call patient with appointment time and date  Contact information:  [address]  [place] [address] [phone]                 Patient Instructions:     Ambulatory consult to Neurology   Referral Priority: Routine Referral Type: Consultation   Referral Reason: Specialty Services Required    Requested Specialty: Neurology    Number of Visits Requested: 1      Ambulatory consult to Physical Therapy   Referral Priority: Routine Referral Type: Physical Medicine   Referral Reason: Specialty Services Required    Requested Specialty: Physical Therapy    Number of Visits Requested: 1      Ambulatory consult to Occupational Therapy   Referral Priority: Routine Referral Type: Occupational Therapy   Referral Reason: Specialty Services Required    Requested Specialty: Occupational Therapy    Number of Visits Requested: 1          Significant Diagnostic Studies:   Imaging Results           FL Lumbar Puncture (xpd) (Final result)  Result time 02/27/18 15:00:15    Final result by Trudy Leger MD (02/27/18 15:00:15)                 Impression:      1. Successful fluoroscopically guided lumbar puncture.      Electronically signed by: TRUDY LEGER MD  Date:     02/27/18  Time:    15:00              Narrative:    Procedure:  Lumbar puncture, diagnostic    Clinical History:     Multiple sclerosis.    Technique/findings: After the risks, benefits and options of the planned procedure were explained to the patient in full detail, the patient expressed complete understanding and gave signed informed consent. procedure was performed by David Aguiar PA-C under the direct supervision of Dr. VASHTI Leger.  The left paraspinous space was localized with fluoroscopy. The skin was marked with indelible ink.  The skin overlying this area was prepped and draped in the usual sterile fashion. A timeout was performed. 1% lidocaine was used as a local anesthetic, and a 22 gauge needle were used to perform a lumbar puncture at L3-L4.  Blunt cc of clear CSF was removed with an opening pressure of 21 cm of water.  The needle was then removed.  A sterile dressing was applied.  The patient tolerated the procedure well without complication, departing the fluoro suite  in unchanged condition.     Fluoroscopic time was  0.1 minutes  and 1 image was obtained.                              Pending Diagnostic Studies:     Procedure Component Value Units Date/Time    Freeze and Hold, [facility] [place] [[id number]] Collected:  02/27/18 1453    Order Status:  Sent Lab Status:  No result     Specimen:  CSF (Spinal Fluid) from Cerebrospinal Fluid          Medications:  Reconciled Home Medications:   Discharge Medication List as of 3/4/2018 11:29 AM      START taking these medications    Details   clonazePAM (KLONOPIN) 0.5 MG tablet Take 1 tablet (0.5 mg total) by mouth every evening., Starting Sun 3/4/2018, Until Mon 3/4/2019, Print          CONTINUE these medications which have CHANGED    Details   calcium carbonate (OS-MAEGAN) 500 mg calcium (1,250 mg) tablet Take 1 tablet (500 mg total) by mouth once daily., Starting Sun 3/4/2018, Until Mon 3/4/2019, Normal      ergocalciferol (ERGOCALCIFEROL) 50,000 unit Cap Take 1 capsule (50,000 Units total) by mouth every 7 days., Starting Wed 3/7/2018, Normal      famotidine (PEPCID) 20 MG tablet Take 1 tablet (20 mg total) by mouth 2 (two) times daily., Starting Sun 3/4/2018, Until Mon 3/4/2019, Normal      paroxetine (PAXIL) 20 MG tablet Take 1 tablet (20 mg total) by mouth once daily., Starting Mon 3/5/2018, Until Tue 3/5/2019, Normal      polyethylene glycol (GLYCOLAX) 17 gram PwPk Take 17 g by mouth once daily., Starting Mon 3/5/2018, Normal      predniSONE (DELTASONE) 20 MG tablet 4 tab for 9 days, 3 tab for 3 days, 2 tab for 3 days,1 tab for 3 days, than discontinue ., Normal      senna-docusate 8.6-50 mg (PERICOLACE) 8.6-50 mg per tablet Take 1 tablet by mouth 2 (two) times daily., Starting Sun 3/4/2018, Normal         STOP taking these medications       diclofenac (VOLTAREN) 50 MG EC tablet Comments:   Reason for Stopping:         promethazine-dextromethorphan (PROMETHAZINE-DM) 6.25-15 mg/5 mL Syrp Comments:   Reason for Stopping:         silver sulfADIAZINE 1% (SILVADENE) 1 % cream Comments:   Reason for Stopping:               Indwelling Lines/Drains at time of discharge:   Lines/Drains/Airways          No matching active lines, drains, or airways          Time spent on the discharge of patient: 41 minutes  Patient was seen and examined on the date of discharge and determined to be suitable for discharge.         Yoselyn Jorge NP  Department of Hospital Medicine  [facility]

## 2018-03-04 NOTE — PLAN OF CARE
Problem: Patient Care Overview  Goal: Plan of Care Review  Outcome: Ongoing (interventions implemented as appropriate)  Patient remains free of falls and safety precautions maintained. Afebrile. Pain controlled. Patient NSR. Had an episode of 140 heart rate, went to check on patient and he was standing brushing his teeth . NP notified of elevation. Patient still tachy but 106 now. Patient to be discharged today. Will continue to monitor. 12 hour chart check.

## 2018-03-04 NOTE — PLAN OF CARE
Problem: Patient Care Overview  Goal: Plan of Care Review  Outcome: Ongoing (interventions implemented as appropriate)  Remains free from injury. Pain managed with PO meds. Psychosocial needs assessed and being met, states no longer feeling as depressed as previously. Ambulating independently. Steroids administered as ordered (dose 9/10). Vital signs stable. Chart reviewed. Will continue to monitor.

## 2018-03-04 NOTE — PT/OT/SLP PROGRESS
Physical Therapy  Treatment    Logan Haywood   MRN: [id number]   Admitting Diagnosis: MS (multiple sclerosis)    PT Received On: 03/04/18  PT Start Time: 0944     PT Stop Time: 1009    PT Total Time (min): 25 min       Billable Minutes:  Therapeutic Activity 12 mins and Neuromuscular Re-education 13 mins    Treatment Type: Treatment  PT/PTA: PT             General Precautions: Standard, fall  Orthopedic Precautions: N/A   Braces: N/A         Subjective:  Communicated with EPIC and nurse (Liz) prior to session.  Patient agreeable to participate with PT.  Makes inappropriate comments toward female staff frequently.    Pain/Comfort  Pain Rating 1: 0/10    Objective:   Patient found with: telemetry, peripheral IV    Functional Mobility:  Bed Mobility:    Supine -> sit and seated scooting to edge of bed with SPV.    Transfers:   Patient able to transfer sit <-> stand and stand-pivot without AD with SBA/SPV.  Unsteady.    Gait:    Patient ambulated in hallway 400 feet without AD with SBA. Unsteady and several episodes of L knee buckling, from which patient was able to self-recover.      Balance:   Static Sit: NORMAL: No deviations seen in posture held statically  Dynamic Sit: GOOD: Maintains balance through MODERATE excursions of active trunk movement  Static Stand: FAIR: Maintains without assist but unable to take challenges  Dynamic stand: FAIR: Needs CONTACT GUARD during gait     Therapeutic Activities and Exercises:  Patient participated in bed mobility and transfer training.  While seated at edge of bed, patient donned socks with SPV, though did struggle with coordination/strength of L hand to complete task.  Patient also participated in seated neuro re-ed activities, including graded movements for grasping/releasing/bringing cup tray <-> mouth.  B UE and LE neuro re-ed exercises with emphasis on smooth, coordinated, reciprocal movements.    AM-PAC 6 CLICK MOBILITY  How much help from another person does this  patient currently need?   1 = Unable, Total/Dependent Assistance  2 = A lot, Maximum/Moderate Assistance  3 = A little, Minimum/Contact Guard/Supervision  4 = None, Modified [place]/Independent    Turning over in bed (including adjusting bedclothes, sheets and blankets)?: 4  Sitting down on and standing up from a chair with arms (e.g., wheelchair, bedside commode, etc.): 4  Moving from lying on back to sitting on the side of the bed?: 4  Moving to and from a bed to a chair (including a wheelchair)?: 3  Need to walk in hospital room?: 3  Climbing 3-5 steps with a railing?: 1  Total Score: 19    AM-PAC Raw Score CMS G-Code Modifier Level of Impairment Assistance   6 % Total / Unable   7 - 9 CM 80 - 100% Maximal Assist   10 - 14 CL 60 - 80% Moderate Assist   15 - 19 CK 40 - 60% Moderate Assist   20 - 22 CJ 20 - 40% Minimal Assist   23 CI 1-20% SBA / CGA   24 CH 0% Independent/ Mod I     Patient left up in chair with all lines intact, call button in reach and nurse and family member/friend present.    Assessment:  Patient making steady progress toward all goals.    Rehab identified problem list/impairments: Rehab identified problem list/impairments: weakness, impaired self care skills, impaired functional mobilty, gait instability, impaired sensation, impaired balance, decreased lower extremity function, decreased upper extremity function, decreased coordination, impaired coordination    Rehab potential is good.    Activity tolerance: Good    Discharge recommendations: Discharge Facility/Level Of Care Needs: outpatient PT     Barriers to discharge:  none    Equipment recommendations: Equipment Needed After Discharge: none     GOALS:    Physical Therapy Goals        Problem: Physical Therapy Goal    Goal Priority Disciplines Outcome Goal Variances Interventions   Physical Therapy Goal     PT/OT, PT Ongoing (interventions implemented as appropriate)     Description:  Goals to be met by: 03/08/18     Patient  will increase functional independence with mobility by performin. Sit to stand transfer with Supervision  2. Bed to chair transfer with Supervision, without LOB  3. Gait  2 x 150 feet with Supervision normal foot clearance on L, no LOB  4. Lower extremity exercise program x20 reps per handout, with supervision  5. Complete precision/repetition exercises with 80% accuracy                      PLAN:    Patient to be seen 5 x/week  to address the above listed problems via gait training, therapeutic activities, therapeutic exercises, neuromuscular re-education  Plan of Care expires: 18  Plan of Care reviewed with: patient         Magali Flowers, PT, DPT  2018

## 2018-03-05 ENCOUNTER — TELEPHONE (OUTPATIENT)
Dept: NEPHROLOGY | Facility: CLINIC | Age: 48
End: 2018-03-05

## 2018-03-05 NOTE — TELEPHONE ENCOUNTER
----- Message from Liz Prieto LPN sent at 3/4/2018  9:54 AM CST -----  No available appointments. Patient to be seen in one week for Hospital follow-up. Please call patient with appointment time and date. Thank you

## 2018-03-06 ENCOUNTER — PATIENT OUTREACH (OUTPATIENT)
Dept: ADMINISTRATIVE | Facility: CLINIC | Age: 48
End: 2018-03-06

## 2018-03-06 ENCOUNTER — TELEPHONE (OUTPATIENT)
Dept: NEPHROLOGY | Facility: CLINIC | Age: 48
End: 2018-03-06

## 2018-03-06 NOTE — TELEPHONE ENCOUNTER
----- Message from Doreen Lopez sent at 3/6/2018  9:32 AM CST -----  Contact: Pt  Pt calling in regards to wanting to speak with the nurse concerning scheduling a hospital f/u within the next 2 weeks with the Dr.    Pt can be reached at .[phone] (xxlq)

## 2018-03-06 NOTE — PATIENT INSTRUCTIONS
Discharge Instructions for Multiple Sclerosis  You have been diagnosed with multiple sclerosis (MS), a disease of the brain, the spinal cord, or both. MS involves the destruction of the covering of the nerves (myelin sheath). When the nerves are damaged, messages from the brain are not transmitted very well. You may not be able to move your body as well as you did before, and you may lose some of your ability to feel things, such as heat or cold. Some people may have vision problems or trouble emptying their bladder. Here are some things you can do to feel better.  Activity  Do's and don'ts:   · Get plenty of rest. Extreme tiredness is a common symptom of MS.  · Plan your activities in advance.  · Avoid excessive heat.  · Use a cane or other aid to help you get around and conserve energy, if needed.  · Stretching can be useful with medicines to help symptoms of stiff muscles.    · Exercise. Aerobic exercise may improve your strength, muscle tone, balance, and coordination. A physical therapist can help you determine which exercises are safe for you.    · Swimming may be a good exercise in which your temperature doesn't increase. Never swim alone.    Other home care  More do's and don'ts:   · Take your medicine exactly as directed by your healthcare provider. Dont skip doses.  · Use hot tubs or long hot baths with caution. If you soak too long in hot water, your muscles may become weak. Dont get in a hot tub unless theres someone nearby who can pull you out if necessary.  · If you become overheated and your symptoms get worse, cool down for a few hours. This will help you return to normal.  · Consider installing an air-conditioning system in your home if you dont already have one.  · Eat a well-balanced diet. Talk to your healthcare provider about taking vitamins.  · Avoid constipation:  ¨ Eat a diet high in fiber.  ¨ Drink 6 to 8 glasses of water every day, unless directed otherwise.  ¨ Use an over-the-counter  laxative as directed by your healthcare provider.  · Let your healthcare provider know:  ¨ About any pain you are having  ¨ About any sexual issues you are having  ¨ If you laugh or cry inappropriately (such as cry when you are really happy)  ¨ If you feel sad or depressed  ¨ If you lose your urine (incontinence)  Follow-up  Make a follow-up appointment.  When to call your healthcare provider  Call your healthcare provider right away if you have any of the following:  · Extreme tiredness or increasing weakness  · Confusion or unusual behavior  · New neurologic symptoms, such as weakness or numbness in the face, arms, or legs  · Double vision or loss of vision  · Trouble urinating or change in the color or odor of your urine  · Fever over 100.4°F (38.0°C)      Date Last Reviewed: 11/8/2015  © 3511-5398 [employer]. [address] [place] [address]. All rights reserved. This information is not intended as a substitute for professional medical care. Always follow your healthcare professional's instructions.

## 2018-03-09 ENCOUNTER — OFFICE VISIT (OUTPATIENT)
Dept: NEUROLOGY | Facility: CLINIC | Age: 48
End: 2018-03-09
Payer: COMMERCIAL

## 2018-03-09 VITALS
SYSTOLIC BLOOD PRESSURE: 100 MMHG | HEART RATE: 104 BPM | DIASTOLIC BLOOD PRESSURE: 62 MMHG | WEIGHT: 156.06 LBS | BODY MASS INDEX: 26.64 KG/M2 | HEIGHT: 64 IN

## 2018-03-09 DIAGNOSIS — G35 MS (MULTIPLE SCLEROSIS): Primary | ICD-10-CM

## 2018-03-09 PROCEDURE — 99999 PR PBB SHADOW E&M-EST. PATIENT-LVL III: CPT | Mod: PBBFAC,,, | Performed by: PSYCHIATRY & NEUROLOGY

## 2018-03-09 PROCEDURE — 99215 OFFICE O/P EST HI 40 MIN: CPT | Mod: S$GLB,,, | Performed by: PSYCHIATRY & NEUROLOGY

## 2018-03-09 RX ORDER — CALCIUM CARBONATE/VITAMIN D3 500MG-5MCG
TABLET ORAL
Refills: 0 | COMMUNITY
Start: 2018-03-04 | End: 2018-09-13

## 2018-03-09 NOTE — LETTER
March 9, 2018                 [facility] Neurology  Neurology  [address] [place] [address]  Phone: [phone]   March 9, 2018     Patient: Logan Haywood   YOB: 1970   Date of Visit: 3/9/2018       To Whom it May Concern:    Logan Haywood was seen in my clinic on 3/9/2018. He is recently diagnosed with new onset Multiple sclerosis and discharged from the hospital on 3/4/2018 . He is very weak and unstable with his medical condition. It will take some time for recovery. It is my recommendation that he should be off from work for at least 30 days from today. We will see him before he goes for working  again.    If you have any questions or concerns, please don't hesitate to call.    Sincerely,         Genny Baeza MD

## 2018-03-09 NOTE — PROGRESS NOTES
Progress note  Neurology      Neurology follow up:  For: New onset MS    SUBJECTIVE:      HPI:   This is a 47-year-old gentleman with newly diagnosed MS, has been discharged   from the hospital on 03/04/2018.  During the hospital course, he had five days   of steroids, 5000 mg b.i.d.  He also has physical therapy.  After the discharge,   he has been given short course of p.o. steroids that he is still on, but he is   here today in the followup visit complaining that he is still weak, numb on his   left side.  His right side also started numbness in the fingers.  He is telling   that his concentration and attention also is very poor and all is new.  He does   not get any energy or stamina.  He wanted to have a quick fix.  He does not feel   that he can go back to work.  History reviewed. No pertinent past medical history.  Past Surgical History:   Procedure Laterality Date    TONSILLECTOMY       Family History   Problem Relation Age of Onset    Hypertension Mother     COPD Mother     Heart disease Father     Diabetes Sister     Stroke Brother      Social History   Substance Use Topics    Smoking status: Current Every Day Smoker     Packs/day: 1.00     Types: Cigarettes    Smokeless tobacco: Not on file    Alcohol use Yes      Comment: seldom       Review of patient's allergies indicates:   Allergen Reactions    Cat/feline products     Chocolate flavor Itching      Patient Active Problem List   Diagnosis    Numbness and tingling    Tobacco abuse    MS (multiple sclerosis)    Depression    Constipation    Cocaine abuse         Review of Systems   Constitutional: Negative for fever and weight loss.   HENT: Negative for ear pain, hearing loss and tinnitus.    Eyes: Negative for blurred vision, double vision, photophobia, pain, discharge and redness.   Respiratory: Negative for cough and shortness of breath.    Cardiovascular: Negative for chest pain, palpitations, claudication and leg swelling.    Gastrointestinal: Negative for abdominal pain, heartburn, nausea and vomiting.   Genitourinary: Negative for dysuria, flank pain, frequency and urgency.   Musculoskeletal: Positive for falls. Negative for back pain, joint pain, myalgias and neck pain.   Skin: Negative for itching and rash.   Neurological: Positive for tingling, sensory change and focal weakness. Negative for dizziness, tremors, speech change, seizures, loss of consciousness, weakness and headaches.   Endo/Heme/Allergies: Does not bruise/bleed easily.   Psychiatric/Behavioral: Positive for depression and memory loss. Negative for hallucinations and suicidal ideas. The patient is not nervous/anxious and does not have insomnia.          OBJECTIVE:     Vital Signs (Most Recent)  Pulse: 104 (03/09/18 1127)  BP: 100/62 (03/09/18 1127)    Physical Exam   Constitutional: He is oriented to person, place, and time. He appears well-developed and well-nourished. No distress.   HENT:   Head: Normocephalic.   Right Ear: External ear normal.   Left Ear: External ear normal.   Mouth/Throat: Oropharynx is clear and moist.   Eyes: Conjunctivae and EOM are normal. Pupils are equal, round, and reactive to light.   Neck: Normal range of motion. Neck supple. No tracheal deviation present. No thyromegaly present.   Cardiovascular: Regular rhythm, normal heart sounds and intact distal pulses.    Pulmonary/Chest: Effort normal and breath sounds normal. No respiratory distress.   Abdominal: Soft. Bowel sounds are normal. There is no tenderness.   Musculoskeletal: He exhibits no edema or tenderness.   Lymphadenopathy:     He has no cervical adenopathy.   Neurological: He is alert and oriented to person, place, and time. He has normal reflexes. He displays no tremor and normal reflexes. A sensory deficit is present. No cranial nerve deficit. He exhibits normal muscle tone. Coordination and gait abnormal. He displays no Babinski's sign on the right side. He displays no  Babinski's sign on the left side.   Reflex Scores:       Tricep reflexes are 2+ on the right side and 2+ on the left side.       Bicep reflexes are 2+ on the right side and 2+ on the left side.       Brachioradialis reflexes are 2+ on the right side and 2+ on the left side.       Patellar reflexes are 2+ on the right side and 2+ on the left side.       Achilles reflexes are 2+ on the right side and 2+ on the left side.  Gait is slow and staggering. FNF testing  He can not do with his left hand. Coordination is poor in the left hand.   Skin: Skin is warm and dry. No rash noted. He is not diaphoretic. No erythema. No pallor.   Psychiatric: He has a normal mood and affect. His behavior is normal. Judgment and thought content normal.         Strength  Deltoids Triceps Biceps Wrist Extension Wrist Flexion Hand    Upper: R 5/5 5/5 5/5 5/5 5/5 5/5    L 4/5 4/5 4+/5 4/5 4/5 4-/5     Iliopsoas Quadriceps Knee  Flexion Tibialis  anterior Gastro- cnemius EHL   Lower: R 5/5 5/5 5/5 5/5 5/5 5/5    L 4+/5 4+/5 4+/5 4+/5 4+/5 4+/5         Laboratory:  Lab Results   Component Value Date    WBC 11.79 03/04/2018    HGB 15.8 03/04/2018    HCT 44.0 03/04/2018     03/04/2018    ALT 21 03/04/2018    AST 10 03/04/2018     03/04/2018    K 4.1 03/04/2018     03/04/2018    CREATININE 0.7 03/04/2018    BUN 19 03/04/2018    CO2 25 03/04/2018    TSH 0.686 02/28/2018    INR 1.0 02/27/2018     Results for RODGER NGUYEN (MRN [id number]) as of 3/9/2018 12:07   Ref. Range 2/27/2018 14:53   Color, CSF Latest Ref Range: Colorless  Colorless   Heme Aliquot Latest Units: mL 5.0   Appearance, CSF Latest Ref Range: Clear  Clear   WBC, CSF Latest Ref Range: 0 - 5 /cu mm 2   RBC, CSF Latest Ref Range: 0 /cu mm 2 (A)   Glucose, CSF Latest Ref Range: 40 - 70 mg/dL 74 (H)   LDH, CSF Latest Units: U/L 26   Protein, CSF Latest Ref Range: 15 - 40 mg/dL 50 (H)   Myelin Basic Prot, CSF Latest Ref Range: <4.1 mcg/L <2.0   CSF Bands Latest  Units: bands 4   Serum Bands Latest Units: bands 0   Olig Bands Interpretation, CSF Latest Ref Range: <4 bands 4 (A)   IgG Index, CSF Latest Ref Range: <=0.85  0.63   Albumin, CSF Latest Ref Range: <=27.0 mg/dL 26.4   IGG/ALBUMIN RATIO, CSF Latest Ref Range: <=0.21  0.12   IgG Synthetic Rate Latest Ref Range: <=12 mg/24 h 3.65   Albumin, Serum Latest Ref Range: 3200 - 4800 mg/dL 3740   IgG, CSF Latest Ref Range: <=8.1 mg/dL 3.1         Diagnostic Results:  MRI of C-spine:  Impression          There are four separate foci of abnormal T2 signal within the cervical cord as discussed above.  The one at the level of C2 demonstrates abnormal enhancement.  These findings are concerning for demyelinating disease such as multiple sclerosis.    Abnormal enhancement may be seen in active demyelination     MRI of the brain:  Impression          Findings are most concerning for demyelinating disease and multiple sclerosis.  One of the lesions within the subcortical white matter of the left temporal lobe demonstrates abnormal enhancement as may be seen with active demyelination.           ASSESSMENT/PLAN:     Assessment:   Encounter Diagnosis   Name Primary?    MS (multiple sclerosis) Yes       Patient Active Problem List   Diagnosis    Numbness and tingling    Tobacco abuse    MS (multiple sclerosis)    Depression    Constipation    Cocaine abuse         Plan  He was advised to go to [place] clinic [place].      A letter to job wasgiven.  Brochure for MS and rebef were given.  Time spent: 30 minutes in face to face discussion concerning diagnosis, prognosis, review of lab and test results, benefits of treatment as well as management of disease, counseling of patient and coordination of care between various health care providers . Greater than half the time spent was used for coordination of care and counseling of patient. This note may have some spelling or wording mistakes which might have been overlooked during proof reading.

## 2018-03-13 ENCOUNTER — INITIAL CONSULT (OUTPATIENT)
Dept: NEUROLOGY | Facility: CLINIC | Age: 48
End: 2018-03-13
Payer: COMMERCIAL

## 2018-03-13 ENCOUNTER — TELEPHONE (OUTPATIENT)
Dept: NEUROLOGY | Facility: CLINIC | Age: 48
End: 2018-03-13

## 2018-03-13 VITALS
SYSTOLIC BLOOD PRESSURE: 110 MMHG | BODY MASS INDEX: 26.63 KG/M2 | WEIGHT: 156 LBS | HEART RATE: 98 BPM | DIASTOLIC BLOOD PRESSURE: 82 MMHG | HEIGHT: 64 IN

## 2018-03-13 DIAGNOSIS — G63 NEUROPATHY DUE TO MEDICAL CONDITION: ICD-10-CM

## 2018-03-13 DIAGNOSIS — G35 MULTIPLE SCLEROSIS, RELAPSING-REMITTING: Primary | ICD-10-CM

## 2018-03-13 DIAGNOSIS — R26.81 GAIT INSTABILITY: ICD-10-CM

## 2018-03-13 DIAGNOSIS — Z51.81 ENCOUNTER FOR THERAPEUTIC DRUG MONITORING: ICD-10-CM

## 2018-03-13 PROCEDURE — 99215 OFFICE O/P EST HI 40 MIN: CPT | Mod: S$GLB,,, | Performed by: PSYCHIATRY & NEUROLOGY

## 2018-03-13 PROCEDURE — 99999 PR PBB SHADOW E&M-EST. PATIENT-LVL III: CPT | Mod: PBBFAC,,, | Performed by: PSYCHIATRY & NEUROLOGY

## 2018-03-13 RX ORDER — GABAPENTIN 100 MG/1
CAPSULE ORAL
Qty: 90 CAPSULE | Refills: 1 | Status: SHIPPED | OUTPATIENT
Start: 2018-03-13 | End: 2018-05-24

## 2018-03-13 NOTE — PROGRESS NOTES
I saw Logan Haywood as a new patient today for multiple sclerosis.  He comes in to [facility] care and is referred by Dr. Baeza.  He is accompanied by his brother.     History of Present Illness  The patient is a 47 y.o. RH male with history of mood disorders and substance abuse, who presents to clinic for MS evaluation. In speaking to patient, he would not like to completely transfer care. Instead, he'd like to come for visit 1-2x year for check up on MS. Fine with keeping current provider as primary neurologist if possible.      At least for past 4-5 years, he has had numbness and painful tingling in feet and restless leg movements at night. Has always had issues with fatigue. No prior weakness.    He was recently admitted to hospital 2/2018 and diagnosed with MS. Initially presented 2/27 to [facility] ED with numbness and weakness to the left side of body along with left-sided pain radiating down left body w/o association with neck flexion. MRI brain demonstrated multiple elliptical foci of T2 hyperintensity perpendicular to the lateral ventricles and some subcortical in both frontal lobes, most consistent with demyelination. There was one enhancing lesion in the subcortical region of the lateral left temporal lobe. He also had several lesions in C-spine but only one enhancing at C2. He was advised to stay for LP and IV steroids, but patient chose to leave AMA. He returned to ED 2/27 for rest of w/u and treatment. He was treated with IV steroids and started on paroxetine for depression as he reported SI and clonazepam for restless legs/sleep.    Since discharge, he was seen by Dr. Baeza for follow-up and treatment with Rebif was discussed with patient. He states that he may have right hand with tiny bit of numbness in fingertips, but no new significant symptoms. When he lays down on left side, he feels altered sensation like there's a pillow between him.    No PT/OT since discharge.      Review of systems:   Fatigue: Yes  "- "severe"   Sleep Disturbance: Yes - no prior sleep study   Bladder Dysfunction: No retention. Polyuria. Urge incontinence.  Bowel Dysfunction: Yes - near accidents. 3 months prior to ED presentation, with bowel incontinence   Spasticity: No   Visual Symptoms: Yes - wears glasses, feels eyesight is worsening. No ophthalmologist   Cognitive: Yes - sometimes with difficulty getting words out   Mood Disorder: Yes - worry a lot, considered suicide, decreased appetite, anxiety, loss of energy. States that suicidal thoughts are "passing thoughts" and would never act on it.   Gait Disturbance: Yes - unsteady due to left leg weakness, bump into walls   Falls: No  Hand Dysfunction: Yes - left hand weakness, cannot  anything  Pain: Yes - neck pain, back pain (near left shoulder blade), aching joints, plantar fasciitis in bilateral feet   Sexual Dysfunction: Not Assessed   Skin Breakdown: Yes - hair loss   Tremors: No   Dysphagia: Yes - difficulty getting food down.   Dysarthria: No but occasionally will trip on words  Heat sensitivity: Yes - increased fatigue   Any un-met adaptive needs? Yes - needs PT/OT   Copay Assist? Yes - will need      History reviewed. No pertinent past medical history.    Past Surgical History:   Procedure Laterality Date    TONSILLECTOMY         Family History   Problem Relation Age of Onset    Hypertension Mother     COPD Mother     Heart disease Father     Diabetes Sister     Stroke Brother      Social History:  Single  Brother helping with care  Quit smoking with diagnosis 3/11/18, 20+pack years  No alcohol.  Initially denied drugs, but states that he has remote cocaine use and more recent cocaine use just after diagnosis because he was anxious. Reports that he no longer plans to use.    Review of patient's allergies indicates:   Allergen Reactions    Cat/feline products     Chocolate flavor Itching       Medication List with Changes/Refills   Current Medications    CALCIUM CARBONATE " "(OS-MAEGAN) 500 MG CALCIUM (1,250 MG) TABLET    Take 1 tablet (500 mg total) by mouth once daily.    CLONAZEPAM (KLONOPIN) 0.5 MG TABLET    Take 1 tablet (0.5 mg total) by mouth every evening.    ERGOCALCIFEROL (ERGOCALCIFEROL) 50,000 UNIT CAP    Take 1 capsule (50,000 Units total) by mouth every 7 days.    FAMOTIDINE (PEPCID) 20 MG TABLET    Take 1 tablet (20 mg total) by mouth 2 (two) times daily.    OYSCO 500/D 500 MG(1,250MG) -200 UNIT PER TABLET    TK 1 T PO  ONCE QD    PAROXETINE (PAXIL) 20 MG TABLET    Take 1 tablet (20 mg total) by mouth once daily.    POLYETHYLENE GLYCOL (GLYCOLAX) 17 GRAM PWPK    Take 17 g by mouth once daily.    PREDNISONE (DELTASONE) 20 MG TABLET    4 tab for 9 days, 3 tab for 3 days, 2 tab for 3 days,1 tab for 3 days, than discontinue .    SENNA-DOCUSATE 8.6-50 MG (PERICOLACE) 8.6-50 MG PER TABLET    Take 1 tablet by mouth 2 (two) times daily.         Physical Exam    Vitals:    03/13/18 1303   BP: 110/82   Pulse: 98   Weight: 70.8 kg (156 lb)   Height: 5' 4" (1.626 m)       In general, the patient is well nourished. Very irritable during history and exam. Kept saying that he couldn't perform strength testing. Pessimistic about recovery.    Fundi are normal bilaterally.    25 foot timed walk: 8.26 sec without assistance    MENTAL STATUS: language is fluent, normal verbal comprehension, short-term and remote memory is intact, attention is normal, patient is alert and oriented x 3, fund of knowlege is appropriate by vocabulary.     CRANIAL NERVE EXAM:  There is no internuclear ophthalmoplegia.  Extraocular muscles are intact. Pupils are equal, round, and reactive to light. No facial asymmetry. Facial sensation is intact bilaterally. There is no dysarthria. Uvula is midline, and palate moves symmetrically. Shoulder shrug intact bilaterlly. Tongue protrusion is midline. Hearing is grossly intact.    MOTOR EXAM: Normal bulk and tone throughout UE and LE bilaterally. No pronator drift; rapid " "sequential movements are normal; Strength is  5/5 on right. Left strength as follows, effort dependent:  Deltoid 3/4-. Biceps, triceps 4-. WF 3+/4-,  4-. LHF 4-, KE / KF / DF 4+    REFLEXES: 2+ at knees, 1+ in UE's; toes are down right, equivocal left    SENSORY EXAM: decreased light touch on left, feels "different"    COORDINATION: Normal finger-to-nose and HKS on right exam, ataxia not out of proportion to weakness on left    GAIT: unsteady, falls to left, hemiparetic gait.      IMAGING (personally reviewed):  MRI Brain w/wo 2/26/18: There is no restricted diffusion to suggest acute infarction. There are multiple elliptical foci of T2 FLAIR hyperintensity that are perpendicular to the lateral ventricles and are most consistent with demyelinating plaques of multiple sclerosis.  There is also subcortical involvement within both frontal lobes.  There is a focus of abnormal enhancement associated with one of the lesions within the subcortical region of the lateral left temporal lobe, best demonstrated on axial postcontrast series 3 image 12 and T2 FLAIR series 6 image 12.  The focus of enhancement measures 6 mm. No additional foci of abnormal enhancement. The posterior fossa structures are unremarkable. No definite abnormal signal within the optic nerves or optic chiasm.  MRI C spine 2/26/18 There is a focus of abnormal T2 signal within the cervical cord at the level of C2 with associated abnormal enhancement.  There is focal abnormal T2 signal within the right hemicord at C3-C4, central cord at the level of C5-C6, and the right hemicord at the level of mid C7 without abnormal enhancement.   MRI T spine 2/26/18 No abnormal cord signal or cord deformity.    LABS:  Lab Results   Component Value Date    WBC 11.79 03/04/2018    HGB 15.8 03/04/2018    HCT 44.0 03/04/2018    MCV 87 03/04/2018     03/04/2018         Chemistry        Component Value Date/Time     03/04/2018 0456    K 4.1 03/04/2018 0456    CL " 101 03/04/2018 0456    CO2 25 03/04/2018 0456    BUN 19 03/04/2018 0456    CREATININE 0.7 03/04/2018 0456     (H) 03/04/2018 0456        Component Value Date/Time    CALCIUM 8.6 (L) 03/04/2018 0456    ALKPHOS 59 03/04/2018 0456    AST 10 03/04/2018 0456    ALT 21 03/04/2018 0456    BILITOT 0.5 03/04/2018 0456    ESTGFRAFRICA >60 03/04/2018 0456    EGFRNONAA >60 03/04/2018 0456          Lab Results   Component Value Date    LABPROT 10.1 02/27/2018    ALBUMIN 3.4 (L) 03/04/2018     Results for RODGER NGUYEN (MRN [id number]) as of 3/9/2018 12:07    Ref. Range 2/27/2018 14:53   Color, CSF Latest Ref Range: Colorless  Colorless   Heme Aliquot Latest Units: mL 5.0   Appearance, CSF Latest Ref Range: Clear  Clear   WBC, CSF Latest Ref Range: 0 - 5 /cu mm 2   RBC, CSF Latest Ref Range: 0 /cu mm 2 (A)   Glucose, CSF Latest Ref Range: 40 - 70 mg/dL 74 (H)   LDH, CSF Latest Units: U/L 26   Protein, CSF Latest Ref Range: 15 - 40 mg/dL 50 (H)   Myelin Basic Prot, CSF Latest Ref Range: <4.1 mcg/L <2.0   CSF Bands Latest Units: bands 4   Serum Bands Latest Units: bands 0   Olig Bands Interpretation, CSF Latest Ref Range: <4 bands 4 (A)   IgG Index, CSF Latest Ref Range: <=0.85  0.63   Albumin, CSF Latest Ref Range: <=27.0 mg/dL 26.4   IGG/ALBUMIN RATIO, CSF Latest Ref Range: <=0.21  0.12   IgG Synthetic Rate Latest Ref Range: <=12 mg/24 h 3.65   Albumin, Serum Latest Ref Range: 3200 - 4800 mg/dL 3740   IgG, CSF Latest Ref Range: <=8.1 mg/dL 3.1       Diagnosis/Assessment/Plan:       1. Multiple sclerosis  · Assessment: Agree with diagnosis based on clinical history, MRI, and CSF studies. Would benefit from DMT for relapsing disease. Do not agree with Rebif given patient's mood disorder, very depressive and pessimistic, and interferons can have associated increased depression. Would likely benefit from Copaxone or Tecfidera.  A PARQ discussion was held for several medications and all questions/concerns were addressed to  patient's satisfaction. Patient has needle aversion and would like to trial Tecfidera at this time.  · Imaging: no new imaging at this time. Will need repeat brain MRI in 6 months +/-spine imaging if new significant symptoms.  · Labs: MS mimickers ordered and Tecfidera pre-screening safety labs ordered  · DMT: Tecfidera, given paperwork for such    2. MS Symptomatic management:  · Fatigue/sleep: encouraged sleep study, but patient states that he is tired of testing as this time. Will defer to next visit  · Bladder dysfunction: no issues  · Visual symptoms: will need yearly eye exam  · Cognitive: may need neuropsych testing in future but suspect mood and sleep disorder contributing  · Mood disorder: unclear if primary or secondary to substance use. Advised to abstain from cocaine and can re-evaluate at next visit.  · Gait disturbance: PT/OT already planned for outpatient visit      Over 50% of the 80 min appt was spent counseling the patient about MS and the management of disease and symptoms    F/u in 3 months    Maria Teresa Mills MD

## 2018-03-13 NOTE — TELEPHONE ENCOUNTER
----- Message from Maria Teresa Mills MD sent at 3/13/2018  2:48 PM CDT -----  Would like to start Tecfidera

## 2018-03-13 NOTE — LETTER
March 20, 2018      Genny Baeza MD  [address] [place] [address]           [place]  [address]  [facility] [address]  Phone: [phone]          Patient: Logan Haywood   MR Number: [id number]   YOB: 1970   Date of Visit: 3/13/2018       Dear Dr. Genny Baeza:    Thank you for referring Logan Haywood to me for evaluation. Attached you will find relevant portions of my assessment and plan of care.    If you have questions, please do not hesitate to call me. I look forward to following Logan Haywood along with you.    Sincerely,    Maria Teresa Mills MD    Enclosure  CC:  No Recipients    If you would like to receive this communication electronically, please contact [email or web address] or [phone] to request more information on [employer] access.    For providers and/or their staff who would like to refer a patient to [facility], please contact us through our one-stop-shop provider referral line, [facility], at [phone].    If you feel you have received this communication in error or would no longer like to receive these types of communications, please e-mail [email or web address]

## 2018-03-19 ENCOUNTER — LAB VISIT (OUTPATIENT)
Dept: LAB | Facility: HOSPITAL | Age: 48
End: 2018-03-19
Attending: FAMILY MEDICINE
Payer: COMMERCIAL

## 2018-03-19 ENCOUNTER — OFFICE VISIT (OUTPATIENT)
Dept: INTERNAL MEDICINE | Facility: CLINIC | Age: 48
End: 2018-03-19
Payer: COMMERCIAL

## 2018-03-19 VITALS
OXYGEN SATURATION: 96 % | TEMPERATURE: 99 F | WEIGHT: 157.19 LBS | BODY MASS INDEX: 26.84 KG/M2 | HEART RATE: 109 BPM | SYSTOLIC BLOOD PRESSURE: 116 MMHG | HEIGHT: 64 IN | DIASTOLIC BLOOD PRESSURE: 74 MMHG

## 2018-03-19 DIAGNOSIS — R26.81 GAIT INSTABILITY: ICD-10-CM

## 2018-03-19 DIAGNOSIS — G35 MS (MULTIPLE SCLEROSIS): Primary | ICD-10-CM

## 2018-03-19 DIAGNOSIS — G63 NEUROPATHY DUE TO MEDICAL CONDITION: ICD-10-CM

## 2018-03-19 DIAGNOSIS — Z72.0 TOBACCO ABUSE: ICD-10-CM

## 2018-03-19 DIAGNOSIS — G35 MULTIPLE SCLEROSIS, RELAPSING-REMITTING: ICD-10-CM

## 2018-03-19 DIAGNOSIS — K59.00 CONSTIPATION, UNSPECIFIED CONSTIPATION TYPE: ICD-10-CM

## 2018-03-19 DIAGNOSIS — Z51.81 ENCOUNTER FOR THERAPEUTIC DRUG MONITORING: ICD-10-CM

## 2018-03-19 DIAGNOSIS — Z13.220 SCREENING FOR LIPID DISORDERS: ICD-10-CM

## 2018-03-19 LAB
ALBUMIN SERPL BCP-MCNC: 3.5 G/DL
ALP SERPL-CCNC: 54 U/L
ALT SERPL W/O P-5'-P-CCNC: 23 U/L
ANION GAP SERPL CALC-SCNC: 9 MMOL/L
AST SERPL-CCNC: 11 U/L
BASOPHILS # BLD AUTO: 0.03 K/UL
BASOPHILS NFR BLD: 0.2 %
BILIRUB SERPL-MCNC: 0.4 MG/DL
BUN SERPL-MCNC: 11 MG/DL
CALCIUM SERPL-MCNC: 10.1 MG/DL
CERULOPLASMIN SERPL-MCNC: 23 MG/DL
CHLORIDE SERPL-SCNC: 105 MMOL/L
CO2 SERPL-SCNC: 29 MMOL/L
CREAT SERPL-MCNC: 0.9 MG/DL
DIFFERENTIAL METHOD: ABNORMAL
EOSINOPHIL # BLD AUTO: 0 K/UL
EOSINOPHIL NFR BLD: 0.2 %
ERYTHROCYTE [DISTWIDTH] IN BLOOD BY AUTOMATED COUNT: 12.2 %
EST. GFR  (AFRICAN AMERICAN): >60 ML/MIN/1.73 M^2
EST. GFR  (NON AFRICAN AMERICAN): >60 ML/MIN/1.73 M^2
FOLATE SERPL-MCNC: 25.7 NG/ML
GLUCOSE SERPL-MCNC: 122 MG/DL
HCT VFR BLD AUTO: 44.8 %
HCYS SERPL-SCNC: 6.9 UMOL/L
HGB BLD-MCNC: 14.9 G/DL
IMM GRANULOCYTES # BLD AUTO: 0.06 K/UL
IMM GRANULOCYTES NFR BLD AUTO: 0.5 %
LYMPHOCYTES # BLD AUTO: 3.5 K/UL
LYMPHOCYTES NFR BLD: 28.1 %
MCH RBC QN AUTO: 30.2 PG
MCHC RBC AUTO-ENTMCNC: 33.3 G/DL
MCV RBC AUTO: 91 FL
MONOCYTES # BLD AUTO: 0.6 K/UL
MONOCYTES NFR BLD: 4.6 %
NEUTROPHILS # BLD AUTO: 8.2 K/UL
NEUTROPHILS NFR BLD: 66.4 %
NRBC BLD-RTO: 0 /100 WBC
PLATELET # BLD AUTO: 209 K/UL
PMV BLD AUTO: 10.4 FL
POTASSIUM SERPL-SCNC: 3 MMOL/L
PROT SERPL-MCNC: 6.4 G/DL
RBC # BLD AUTO: 4.93 M/UL
SODIUM SERPL-SCNC: 143 MMOL/L
VIT B12 SERPL-MCNC: 453 PG/ML
WBC # BLD AUTO: 12.37 K/UL

## 2018-03-19 PROCEDURE — 99204 OFFICE O/P NEW MOD 45 MIN: CPT | Mod: S$GLB,,, | Performed by: FAMILY MEDICINE

## 2018-03-19 PROCEDURE — 82525 ASSAY OF COPPER: CPT

## 2018-03-19 PROCEDURE — 82390 ASSAY OF CERULOPLASMIN: CPT

## 2018-03-19 PROCEDURE — 36415 COLL VENOUS BLD VENIPUNCTURE: CPT

## 2018-03-19 PROCEDURE — 99999 PR PBB SHADOW E&M-EST. PATIENT-LVL IV: CPT | Mod: PBBFAC,,, | Performed by: FAMILY MEDICINE

## 2018-03-19 PROCEDURE — 83090 ASSAY OF HOMOCYSTEINE: CPT

## 2018-03-19 PROCEDURE — 86360 T CELL ABSOLUTE COUNT/RATIO: CPT

## 2018-03-19 PROCEDURE — 82746 ASSAY OF FOLIC ACID SERUM: CPT

## 2018-03-19 PROCEDURE — 86334 IMMUNOFIX E-PHORESIS SERUM: CPT

## 2018-03-19 PROCEDURE — 82607 VITAMIN B-12: CPT

## 2018-03-19 PROCEDURE — 86592 SYPHILIS TEST NON-TREP QUAL: CPT

## 2018-03-19 PROCEDURE — 85025 COMPLETE CBC W/AUTO DIFF WBC: CPT

## 2018-03-19 PROCEDURE — 84165 PROTEIN E-PHORESIS SERUM: CPT | Mod: 26,,, | Performed by: PATHOLOGY

## 2018-03-19 PROCEDURE — 80053 COMPREHEN METABOLIC PANEL: CPT

## 2018-03-19 PROCEDURE — 86359 T CELLS TOTAL COUNT: CPT

## 2018-03-19 PROCEDURE — 84165 PROTEIN E-PHORESIS SERUM: CPT

## 2018-03-19 PROCEDURE — 86334 IMMUNOFIX E-PHORESIS SERUM: CPT | Mod: 26,,, | Performed by: PATHOLOGY

## 2018-03-19 PROCEDURE — 86618 LYME DISEASE ANTIBODY: CPT

## 2018-03-19 RX ORDER — CLONAZEPAM 0.5 MG/1
0.5 TABLET ORAL NIGHTLY
Qty: 15 TABLET | Refills: 0 | Status: SHIPPED | OUTPATIENT
Start: 2018-03-19 | End: 2018-05-24 | Stop reason: ALTCHOICE

## 2018-03-19 NOTE — PROGRESS NOTES
"Subjective:       Patient ID: Logan Haywood is a 47 y.o. male.    Chief Complaint: Hospital Follow Up (Multiple Sclerosis) and Establish Care    HPI     Discharge Information     Discharge Date:  3/4/18        Primary Discharge Diagnosis:  MS       How patient is feeling since discharge from the hospital? Unimproved.          Medication & Order Review     Discharge Medication Review:    Medication reconciliation performed Yes   If no, state reason why not performed: N/A       Did patient have any difficulty/problems filling prescriptions?  no              Patient declines Home health                 48 yo M    PMH of MS, Had asthma as a child, insomnia, allergic rhinitis -     Hospital discharged 3/4/18 - diagnosis of MS  Prior to this he was taking no medications.    Reports had drastic decline prior to ER visit   States 4 days prior to hospital had L hand weakness. Felt numbness traveling upwards - arm is numb.    Reports needs to stretch legs.   Takes Klonopin in past - for this and it did help - but he has no refills.   Feels like " wants to pull legs off "   Not cramping.    MS - recently diagnosis  Neurologist - Saw Dr. Baeza in hospital - but also saw MD in [facility].  Uncertain about follow up.    Establish Care -     Formerly sold Cars for a living.   Stays with his brother.    Smokes - very long time  Per patient.  Pack / day - down to 1/2 pack /day  No alcohol  Formerly took cocaine -   Took cocaine on day of diagnosis.    Takes senna for constipation.  Knows he should take Miralax.      Just started anti-depressant with hospital discharge.  Still taking steroids - "not much"  May have missed some.  Has noted Right side fingers also numb.    Notes poor concentration    Has seen Dr. Baeza and Dr. Mills ( [facility])      Review of Systems   Constitutional: Negative for chills and fever.   HENT: Negative for trouble swallowing.    Eyes: Negative for visual disturbance.   Respiratory: Negative for shortness " of breath.    Cardiovascular: Negative for chest pain.   Gastrointestinal: Positive for constipation. Negative for diarrhea.   Genitourinary: Negative for difficulty urinating.   Musculoskeletal: Negative for gait problem.   Skin: Negative for rash.   Neurological: Negative for dizziness and light-headedness.   Psychiatric/Behavioral: Negative for dysphoric mood.       Objective:       Vitals:    03/19/18 1333   BP: 116/74   Pulse: 109   Temp: 98.8 °F (37.1 °C)       Physical Exam   Constitutional: He is oriented to person, place, and time. He appears well-developed and well-nourished. No distress.   HENT:   Head: Normocephalic and atraumatic.   Right Ear: Hearing, tympanic membrane, external ear and ear canal normal.   Left Ear: Hearing, tympanic membrane, external ear and ear canal normal.   Nose: Nose normal. Right sinus exhibits no maxillary sinus tenderness and no frontal sinus tenderness. Left sinus exhibits no maxillary sinus tenderness and no frontal sinus tenderness.   Mouth/Throat: Uvula is midline, oropharynx is clear and moist and mucous membranes are normal.   Eyes: Conjunctivae are normal. Right eye exhibits no discharge. Left eye exhibits no discharge.   Neck: Trachea normal, normal range of motion and full passive range of motion without pain.   Cardiovascular: Normal rate, regular rhythm, normal heart sounds and intact distal pulses.    Pulmonary/Chest: Effort normal and breath sounds normal. No respiratory distress. He has no decreased breath sounds. He has no wheezes.   Abdominal: Soft. Normal appearance and bowel sounds are normal. He exhibits no distension and no mass. There is no tenderness. There is no guarding. No hernia.   Musculoskeletal: Normal range of motion. He exhibits no edema or deformity.   Lymphadenopathy:     He has no cervical adenopathy.   Neurological: He is alert and oriented to person, place, and time.   Skin: Skin is warm, dry and intact. No rash noted. No erythema. No  pallor.   Psychiatric: He has a normal mood and affect. His speech is normal and behavior is normal. Thought content normal.       Assessment:       1. MS (multiple sclerosis)    2. Constipation, unspecified constipation type    3. Tobacco abuse    4. Screening for lipid disorders        Plan:   MS (multiple sclerosis)    Following Neurology.  Still taking some steroids. Advised finish per neuro instructions.  PT is set for tomorrow.   Labs are pending - ordered by Neuro.  States Klonopin aids symptoms at night  15 tablets until see neuro.  Discussed side effects - avoid alcohol / drugs.     Constipation, unspecified constipation type  Miralax    Tobacco abuse  Advised cessation.  Encouraged nicotine gum.  Has quit before.      Screening for HLD  Lipid panel    Patient may need disability   Concerned about his ability.     Patient declined all immunizations.  Declined home health.    Follow-up in about 6 weeks (around 4/30/2018).

## 2018-03-20 ENCOUNTER — CLINICAL SUPPORT (OUTPATIENT)
Dept: REHABILITATION | Facility: HOSPITAL | Age: 48
End: 2018-03-20
Payer: COMMERCIAL

## 2018-03-20 DIAGNOSIS — R26.9 ABNORMALITY OF GAIT: Primary | ICD-10-CM

## 2018-03-20 DIAGNOSIS — R29.898 LEFT HAND WEAKNESS: ICD-10-CM

## 2018-03-20 DIAGNOSIS — G35 MS (MULTIPLE SCLEROSIS): Primary | ICD-10-CM

## 2018-03-20 LAB
ABSOLUTE CD3: 2639 CELLS/UL (ref 700–2100)
ABSOLUTE CD8: 707 CELLS/UL (ref 200–900)
ALBUMIN SERPL ELPH-MCNC: 3.74 G/DL
ALPHA1 GLOB SERPL ELPH-MCNC: 0.29 G/DL
ALPHA2 GLOB SERPL ELPH-MCNC: 0.74 G/DL
B-GLOBULIN SERPL ELPH-MCNC: 0.71 G/DL
CD3%: 72.2 % (ref 55–83)
CD3+CD4+ CELLS # BLD: 1893 CELLS/UL (ref 300–1400)
CD3+CD4+ CELLS NFR BLD: 51.8 % (ref 28–57)
CD4/CD8 RATIO: 2.68 (ref 0.9–3.6)
CD8 % SUPPRESSOR T CELL: 19.4 % (ref 10–39)
GAMMA GLOB SERPL ELPH-MCNC: 0.51 G/DL
INTERPRETATION SERPL IFE-IMP: NORMAL
PATHOLOGIST INTERPRETATION IFE: NORMAL
PATHOLOGIST INTERPRETATION SPE: NORMAL
PROT SERPL-MCNC: 6 G/DL
RPR SER QL: NORMAL

## 2018-03-20 PROCEDURE — 97162 PT EVAL MOD COMPLEX 30 MIN: CPT

## 2018-03-20 PROCEDURE — 97165 OT EVAL LOW COMPLEX 30 MIN: CPT

## 2018-03-20 PROCEDURE — 97110 THERAPEUTIC EXERCISES: CPT

## 2018-03-20 NOTE — PROGRESS NOTES
Occupational Therapy -Hand / Wrist  Evaluation    Patient: Logan Haywood  MRN: [id number]  Date of Evaluation: 3/20/2018  Referring Physician:  Yoselyn Jorge NP     Diagnosis:   1. MS (multiple sclerosis)     2. Left hand weakness       Precautions:  Universal; avoid heat and fatigue    Start Time: 2:05 pm  End Time: 3:15 pm    Referral Orders:   Eval and treat     Occupation:  Sales  Working presently:  no    Past Medical History/Physical Systems Review: No past medical history on file.  Medications: Logan Haywood has a current medication list which includes the following prescription(s): calcium carbonate, clonazepam, ergocalciferol, famotidine, gabapentin, oysco 500/d, paroxetine, polyethylene glycol, prednisone, senna-docusate 8.6-50 mg, and sennosides/docusate sodium.    Hand dominance: Right    Date of onset: Gradual onset  History of Present Illness/Mechanism of Injury: Feels that he may have had symptoms for over a year as he had trouble with his foot and back, but these things gradually got better. L hand paresthesias began in late February 4-5 days prior to going to ER on 2/26/18.    Prior Therapy:  none      Visit #1 of 20, expires 12/31/2018    Subjective:  Pt reports several people have told him he needs to find a  but no one has helped him find one. Pt reports he does not know if he will be able come to therapy due to inability to work and future loss of insurance. He states he has not read up on diagnosis but knows he should. He does not think it will be feasible for his brother to continue helping him for very long. He moved in with brother after diagnosis.     Pain   At rest: 6-7 out of 10  With work/ Activity: increases   Sleeping: unable to sleep  Location of Pain : hands, dorsal forearm, and lateral brachium    Objective:   Observation: Hand at rest postures as a claw hand.     Sensation: Hypersensitivity reported lateral brachium, dorsal Fa. Paresthesias reported.      Edema:  Mild visible edema noted based on decreased wrist and finger crease visibility L vs R.    Range of Motion:   Decreased coordination for all motions.   Full passive PIP extension, though painful. Pt unable to extend PIPs actively. Limited functioning of intrinsic mm for int +, digit abd/add. Decreased coordination noted with attempts at opposition to finger tips.     PIP extension lag with attempt at full extension:  IF 45   MF 60   RF 60  SF  60         MF DIP 20    Wrist extension/flexion occurs via tenodesis.   RD/UD WNL but with extension substitution.    Pain with end range elbow extension at medial elbow.    Shoulder flexion 110     Abd 90     Functional IR and ER                    MMT: intrinsic weakness, loss of eccentric control noted at elbow.                                       and Pinch Strength (in pounds, psi's):   Left Right    II 18 61    III     Lateral 3.5 4   Tripod 2.5 7   Tip 1 6       Prior Level of Function: Independent    Functional Limitations:   Self Care / ADL: Limited use of L UE for ADLs: donning socks and shoes, pants, buttoning; washing R side;   Work/Activities: Limited use of L UE for driving, typing  (work requires walking, typing, driving); limited ability for housework, taking out trash, laundry, meal prep,   Leisure: none    Quick DASH administered. Pt exhibits 47% impairment.  Quick DASH Questionnaire    Activity:  1. Open a tight jar: moderate Difficulty  2. Do heavy household chores: moderate Difficulty  3. Carry a shopping bag or briefcase: no Difficulty  4. Wash your back: moderate Difficulty  5.Use a knife to cut food:moderate Difficulty  6.. Recreational activities requiring force through arm: moderate Difficulty    7. Social Limitation: moderateLimited  8. Work/ADL Limitation: unable     Severity of Symptoms (over the past week)  9. Pain: severe  10. Tingling: moderate    11. Sleeping Limitation: severe Difficulty    Score:47        Treatment  Included: OT evaluation and instruction in written HEP including gentle AROM as tolerated  Modalities: none  Manual: (0 min) none  There Ex: (20 min)    Gentle AROM 2-3 reps each:   -Elbow E/F 3 ways   -FA and Wrist all planes   -jt blocks fingers   -place/hold wave   -AROM fist, straight fist, lifts, PIP extension with MP flexed   -AAROM spreads   -Desensitization  Education: Pt educated re: avoiding heat, avoiding over-fatigue. Discussed gentle motion within pain tolerance and attempting to control eccentric motion. Issued [employer] catalog for adaptive equipment to assist with ADL independence, however pt reports he would not be able to afford to purchase anything.  Educated pt on energy conservation and prioritizing tasks each day. 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 Called [facility] and was told that pt should call [facility] directly for resources and case management. If he loses his insurance, then [facility] will be able to assist him with applying for Medicaid and locating other resources. Supplied pt with instructions and phone numbers to begin process.      Patient demonstrates good understanding of HEP however reports motion is painful.      Assessment:  Pt is a 47 y.o. year old male who presents to occupational therapy after being diagnosed with MS approx one month prior. Pt exhibits the following deficits:     Decreased edcuation re: diagnosis  Need for   Edema   Increased pain  Hypersensitivity  Paresthesias  Decreased Sensation  Decreased ROM   Joint stiffness  Decreased  and pinch strength   Decreased muscle strength   Decreased functional hand use of L non-dominant UE    These impairments are limiting the patient's ability to perform ADLs, work, and leisure tasks without limitations. Pt will benefit from skilled OT intervention  to address the above deficits and improve functional use of the L upper extremity.      Co-morbidities which may impact the plan of care and potentially impede the patient's progress in therapy include: progressive nature of diagnosis  Patients CLINICAL PRESENTATION is STABLE.     Short Term Goals: (4 weeks)  1. Pt will be independent with HEP in 2 visits.  2. Pt will report decreased pain to a 4-5/10 with AROM.  3. Pt will make appointment with  with [facility] or [facility] before 2nd therapy visit.    Long Term Goals: (by discharge)  1. Pt will exhibit improved eccentric control of elbow by discharge to prevent dropping of objects.  2. Pt will maximize PIP extension to allow functional grasp as an assist for ADLs.  3. Pt will be I with energy conservation principles to prioritize schedule to ensure important tasks are accomplished.   4. Maximize functional pinch strength to allow opening containers.   5. Maximize L  strength to enable assist for ADLs.     Plan:   Initiate Occupational Therapy 1-2  times per week for 4-6 weeks to decrease pain and edema, and increase A/PROM, strength, and functional use of L upper extremity.    Treatment will include:  Modalities to decrease pain (moist heat, paraffin, fluidotherapy, US, iontophoresis), edema control, scar mobilization, manual therapy/joint mobilizations,A/PROM, therapeutic exercises/activities, strengthening, desensitization/sensory re-education, HEP/education as well as any other treatments deemed necessary based on the patient's needs or progress.               I certify the need for these services furnished under this plan of treatment and while under my care    GOLD Jennings, HANNAH   Occupational Therapist      ____________________________________                         __________________  Physician/Referring Practitioner                                               Date of Signature

## 2018-03-20 NOTE — PROGRESS NOTES
PHYSICAL THERAPY INITIAL OUTPATIENT EVALUATION    Referring Provider:  Yoselyn Jorge    Diagnosis:         ICD-10-CM ICD-9-CM    1. Abnormality of gait R26.9 781.2      Orders:  Evaluate and Treat    Date of Initial Evaluation: 3/20/18    Visit # 1 of 20    SUBJECTIVE: Patient reports that he was recently diagnosed with MS about a month ago. He was having numbness on his entire L side, but mainly his L Ue. He reports not having any noticeable symptoms before that time and when went to the ED he was diagnosed with Ms. He reports he has not fallen, but does find himself bumping/hitting walls and becomes very fatigued- even just walking to get his mail. He has a lot of other worries in his life such as work, needing disability, financially not sound, and having to live with his brother. PLOF includes: IND with everything.    OBJECTIVE    Gait: Pt ambulates with ataxic gait pattern, wide CÉSAR, varied step length     Neuro/Sensation: impaired to L side, can still feel deep pressure     ROM:  Decreased L hip FL to L side    Strength:    Strength Right Left   Hip Flexion 4 3   Knee Extension 4 3+   Ankle DF 4 3+   Ankle PF 4 3+   Knee Flexors 4 3+   Hip Abduction       4       3+  Hip Extension       3+       3     Sit to stand: able to perform without UE support    Function:  Patient reports 43% impairment on the optimal questionnaire on initial evaluation.    TUG assessment: >14 seconds    ASSESSMENT:  The patient is a 47 y.o. year old male referred to physical therapy with complaints of difficulty walking and impaired endurance.  Patient presents with the following objective findings: ataxic gait pattern, impaired balance, decreased core/LE strength, numbness to L side, and increased lower back pain.  These impairments are limiting patient's ability to perform ADLs, work, and wanted activities due to increased chance for falls and impaired endurance to be able to perform.  Patient's prognosis is good for stated goals.   Patient will benefit from skilled physical therapy intervention to address impairments and education on progression of disease, maintain mobility, and importance of working on cardiopulmonary efficiency.    Co-morbidities which may impact the plan of care and potentially impede the patient's progress in therapy include: MS (recently diagnosed)  Patients CLINICAL PRESENTATION is EVOLVING.     Short Term Goals:    1.IND with HEP  2.Improve strength by 1/2 grade    Long Term Goals:  1.Improve to low fall risk on standardized balance assessment  2.Improve strength by 1 grade  3. Improve quality of gait    TREATMENT PROVIDED:  -Evaluation  -Therapeutic Exercise: 5 min  Went over HEP of bridges, LAQ, sit<>stands, hip AB  -Education/HEP/disease: 5 min    PLAN:  Patient will benefit from physical therapy (1-2) x/week for (4-6) weeks including manual therapy, therapeutic exercise, therapeutic/functional activities, neuromuscular re-education, gait training, modalities, and patient education.    Thank you for this referral.    These services are reasonable and necessary for the conditions set forth above while under my care.    Daniel Sweet, PT, DPT

## 2018-03-21 LAB — B BURGDOR AB SER IA-ACNC: 0.04 INDEX VALUE

## 2018-03-22 LAB — COPPER SERPL-MCNC: 873 UG/L (ref 665–1480)

## 2018-03-26 DIAGNOSIS — G35 MULTIPLE SCLEROSIS: Primary | ICD-10-CM

## 2018-03-26 NOTE — TELEPHONE ENCOUNTER
Rx called in as prescribed (called in both starter pack and maintenance dose) to [facility] at [facility].

## 2018-03-27 RX ORDER — DIMETHYL FUMARATE 240 MG/1
240 CAPSULE ORAL 2 TIMES DAILY
Qty: 180 CAPSULE | Refills: 0 | Status: SHIPPED | OUTPATIENT
Start: 2018-03-27 | End: 2019-01-24

## 2018-04-10 ENCOUNTER — OFFICE VISIT (OUTPATIENT)
Dept: NEUROLOGY | Facility: CLINIC | Age: 48
End: 2018-04-10
Payer: COMMERCIAL

## 2018-04-10 VITALS
DIASTOLIC BLOOD PRESSURE: 70 MMHG | SYSTOLIC BLOOD PRESSURE: 110 MMHG | WEIGHT: 158.75 LBS | BODY MASS INDEX: 27.1 KG/M2 | HEIGHT: 64 IN | HEART RATE: 112 BPM

## 2018-04-10 DIAGNOSIS — R20.0 NUMBNESS AND TINGLING: ICD-10-CM

## 2018-04-10 DIAGNOSIS — G35 MS (MULTIPLE SCLEROSIS): Primary | ICD-10-CM

## 2018-04-10 DIAGNOSIS — R20.2 NUMBNESS AND TINGLING: ICD-10-CM

## 2018-04-10 PROCEDURE — 99999 PR PBB SHADOW E&M-EST. PATIENT-LVL III: CPT | Mod: PBBFAC,,, | Performed by: PSYCHIATRY & NEUROLOGY

## 2018-04-10 PROCEDURE — 99215 OFFICE O/P EST HI 40 MIN: CPT | Mod: S$GLB,,, | Performed by: PSYCHIATRY & NEUROLOGY

## 2018-04-10 RX ORDER — ZOLPIDEM TARTRATE 5 MG/1
5 TABLET ORAL NIGHTLY PRN
Qty: 30 TABLET | Refills: 1 | Status: SHIPPED | OUTPATIENT
Start: 2018-04-10 | End: 2018-04-10 | Stop reason: SDUPTHER

## 2018-04-10 RX ORDER — ZOLPIDEM TARTRATE 5 MG/1
5 TABLET ORAL NIGHTLY PRN
Qty: 30 TABLET | Refills: 1 | Status: SHIPPED | OUTPATIENT
Start: 2018-04-10 | End: 2018-06-14

## 2018-04-10 RX ORDER — HYDROCODONE BITARTRATE AND ACETAMINOPHEN 10; 325 MG/1; MG/1
1 TABLET ORAL EVERY 12 HOURS PRN
Qty: 15 TABLET | Refills: 0 | Status: SHIPPED | OUTPATIENT
Start: 2018-04-10 | End: 2018-06-14

## 2018-04-11 NOTE — PROGRESS NOTES
HISTORY OF PRESENT ILLNESS:  This is a 47-year-old gentleman recently diagnosed   with MS, now on [employer] and taking expert opinion from [place] in the MS   Clinic.  States last week that he was in pain, so at present given to take   Tylenol, ibuprofen, or gabapentin, but he came here today to express his pain.    He was restless in the room and also arguing with that he is in pain, he needs   pain medications.  He does not use to taking any pain medications.  So in the   hospital and also in the discharge, we discussed that limit pain medication, we   should not give pain medications, but today he was in pain and he is asking for   medications, so advice was given that for MS patients, they have to live with   gabapentin, Tylenol or ibuprofen, but it should not be possible to continue pain   medications as desired by the patient.  So, because of his pain sensation, some   Norco and also Ambien was given so that he can have some rest with advice that   it should not be refilled.  Any further medications or care, please keep appointment   with Dr. Mills, she is our MS specialist.  Also, advice given that he does not   need to see me often until he gets stabilized by the MS Clinic.  He said that he   did not improve at all, he still has numbness in his hand, he cannot work with   his left hand, it is not acting right.  He is still going to physical therapy   and occupational therapy.

## 2018-04-16 ENCOUNTER — PATIENT OUTREACH (OUTPATIENT)
Dept: ADMINISTRATIVE | Facility: HOSPITAL | Age: 48
End: 2018-04-16

## 2018-05-02 ENCOUNTER — NURSE TRIAGE (OUTPATIENT)
Dept: ADMINISTRATIVE | Facility: CLINIC | Age: 48
End: 2018-05-02

## 2018-05-02 DIAGNOSIS — G89.29 OTHER CHRONIC PAIN: ICD-10-CM

## 2018-05-02 DIAGNOSIS — G35 MULTIPLE SCLEROSIS: Primary | ICD-10-CM

## 2018-05-02 NOTE — TELEPHONE ENCOUNTER
Reason for Disposition   Nursing judgment    Protocols used: ST NO PROTOCOL CALL: SICK ADULT-A-[place]  Mr. Haywood states he is experiencing pain. Patient is requesting pain medication. He states he has numbness and tingling that has been present since his hospitalization. Patient states his main concern is the pain. Mr. Haywood advised to go to the ED.

## 2018-05-02 NOTE — TELEPHONE ENCOUNTER
Call returned to pt. He states he is in extreme pain along his left side. Has been taking gabapentin and states that it simply stopped the tingling sensation but the numbness and pain has never gone away. He was given 15 Norco by Dr Baeza which helped him tremendously. He is also extremely fatigued much of the time.    Advised pt that will speak with Dr Mills and get back with him. If his pain becomes intolerable, he should go to the ED. Verbalizes understanding.

## 2018-05-04 ENCOUNTER — TELEPHONE (OUTPATIENT)
Dept: NEUROLOGY | Facility: CLINIC | Age: 48
End: 2018-05-04

## 2018-05-04 NOTE — TELEPHONE ENCOUNTER
Returned call to Logan, who said he is still having pain in both feet and the left side of his body. Advised he could go up on the gabapentin to 300mg TID to treat neuropathic pain. He is aware Dr. Mills does not prescribe opiates because neuropathic pain does not respond to opiate medication. He would like to see a pain management to address his pain. He is also aware of if the pain is unbearable he can be seen in the ER.

## 2018-05-04 NOTE — TELEPHONE ENCOUNTER
----- Message from [place] TAYLOR Rina sent at 5/4/2018  2:36 PM CDT -----  Needs Medical Advice    Who Called: Pt  Communication Preference (Emmie response to Pt. (or) Call Back # and timeframe): [phone]  Additional Information: Pt states he's in extreme pain and would like to speak w/ the doctor about being prescribed something else besides gabapetnin. Pt states he hasn't slept for 2 days bc of the pain. Pt is asking for a call back today. (pt said pain is feet, all the way up to left side of body)

## 2018-05-06 ENCOUNTER — HOSPITAL ENCOUNTER (EMERGENCY)
Facility: HOSPITAL | Age: 48
Discharge: HOME | End: 2018-05-06
Attending: EMERGENCY MEDICINE
Payer: MEDICAID

## 2018-05-06 VITALS
RESPIRATION RATE: 20 BRPM | HEART RATE: 89 BPM | SYSTOLIC BLOOD PRESSURE: 113 MMHG | OXYGEN SATURATION: 97 % | HEIGHT: 64 IN | TEMPERATURE: 98 F | WEIGHT: 165.44 LBS | DIASTOLIC BLOOD PRESSURE: 79 MMHG | BODY MASS INDEX: 28.24 KG/M2

## 2018-05-06 DIAGNOSIS — M79.10 MYALGIA: Primary | ICD-10-CM

## 2018-05-06 LAB
ALBUMIN SERPL BCP-MCNC: 4 G/DL
ALP SERPL-CCNC: 60 U/L
ALT SERPL W/O P-5'-P-CCNC: 62 U/L
ANION GAP SERPL CALC-SCNC: 9 MMOL/L
AST SERPL-CCNC: 32 U/L
BASOPHILS # BLD AUTO: 0.02 K/UL
BASOPHILS NFR BLD: 0.3 %
BILIRUB SERPL-MCNC: 0.4 MG/DL
BILIRUB UR QL STRIP: NEGATIVE
BUN SERPL-MCNC: 12 MG/DL
CALCIUM SERPL-MCNC: 9 MG/DL
CHLORIDE SERPL-SCNC: 108 MMOL/L
CLARITY UR: CLEAR
CO2 SERPL-SCNC: 25 MMOL/L
COLOR UR: YELLOW
CREAT SERPL-MCNC: 0.8 MG/DL
DIFFERENTIAL METHOD: ABNORMAL
EOSINOPHIL # BLD AUTO: 1.1 K/UL
EOSINOPHIL NFR BLD: 16.5 %
ERYTHROCYTE [DISTWIDTH] IN BLOOD BY AUTOMATED COUNT: 14 %
EST. GFR  (AFRICAN AMERICAN): >60 ML/MIN/1.73 M^2
EST. GFR  (NON AFRICAN AMERICAN): >60 ML/MIN/1.73 M^2
FLUAV AG SPEC QL IA: NEGATIVE
FLUBV AG SPEC QL IA: NEGATIVE
GLUCOSE SERPL-MCNC: 98 MG/DL
GLUCOSE UR QL STRIP: NEGATIVE
HCT VFR BLD AUTO: 42.4 %
HGB BLD-MCNC: 14.5 G/DL
HGB UR QL STRIP: NEGATIVE
KETONES UR QL STRIP: NEGATIVE
LEUKOCYTE ESTERASE UR QL STRIP: NEGATIVE
LYMPHOCYTES # BLD AUTO: 1.2 K/UL
LYMPHOCYTES NFR BLD: 18.2 %
MCH RBC QN AUTO: 31 PG
MCHC RBC AUTO-ENTMCNC: 34.2 G/DL
MCV RBC AUTO: 91 FL
MONOCYTES # BLD AUTO: 0.5 K/UL
MONOCYTES NFR BLD: 7.3 %
NEUTROPHILS # BLD AUTO: 3.7 K/UL
NEUTROPHILS NFR BLD: 57.7 %
NITRITE UR QL STRIP: NEGATIVE
PH UR STRIP: 6 [PH] (ref 5–8)
PLATELET # BLD AUTO: 165 K/UL
PMV BLD AUTO: 9.5 FL
POTASSIUM SERPL-SCNC: 3.8 MMOL/L
PROT SERPL-MCNC: 6.9 G/DL
PROT UR QL STRIP: NEGATIVE
RBC # BLD AUTO: 4.68 M/UL
SODIUM SERPL-SCNC: 142 MMOL/L
SP GR UR STRIP: >=1.03 (ref 1–1.03)
SPECIMEN SOURCE: NORMAL
URN SPEC COLLECT METH UR: ABNORMAL
UROBILINOGEN UR STRIP-ACNC: NEGATIVE EU/DL
WBC # BLD AUTO: 6.43 K/UL

## 2018-05-06 PROCEDURE — 25000003 PHARM REV CODE 250: Performed by: EMERGENCY MEDICINE

## 2018-05-06 PROCEDURE — 80053 COMPREHEN METABOLIC PANEL: CPT

## 2018-05-06 PROCEDURE — 99283 EMERGENCY DEPT VISIT LOW MDM: CPT | Mod: 25

## 2018-05-06 PROCEDURE — 63600175 PHARM REV CODE 636 W HCPCS: Performed by: EMERGENCY MEDICINE

## 2018-05-06 PROCEDURE — 96374 THER/PROPH/DIAG INJ IV PUSH: CPT

## 2018-05-06 PROCEDURE — 81003 URINALYSIS AUTO W/O SCOPE: CPT

## 2018-05-06 PROCEDURE — 87400 INFLUENZA A/B EACH AG IA: CPT

## 2018-05-06 PROCEDURE — 85025 COMPLETE CBC W/AUTO DIFF WBC: CPT

## 2018-05-06 RX ORDER — HYDROCODONE BITARTRATE AND ACETAMINOPHEN 5; 325 MG/1; MG/1
1 TABLET ORAL
Status: COMPLETED | OUTPATIENT
Start: 2018-05-06 | End: 2018-05-06

## 2018-05-06 RX ORDER — MORPHINE SULFATE 4 MG/ML
4 INJECTION, SOLUTION INTRAMUSCULAR; INTRAVENOUS
Status: COMPLETED | OUTPATIENT
Start: 2018-05-06 | End: 2018-05-06

## 2018-05-06 RX ORDER — OXYCODONE AND ACETAMINOPHEN 5; 325 MG/1; MG/1
1 TABLET ORAL EVERY 4 HOURS PRN
Qty: 20 TABLET | Refills: 0 | Status: SHIPPED | OUTPATIENT
Start: 2018-05-06 | End: 2018-06-14

## 2018-05-06 RX ADMIN — HYDROCODONE BITARTRATE AND ACETAMINOPHEN 1 TABLET: 5; 325 TABLET ORAL at 07:05

## 2018-05-06 RX ADMIN — MORPHINE SULFATE 4 MG: 4 INJECTION INTRAVENOUS at 09:05

## 2018-05-06 NOTE — ED PROVIDER NOTES
"SCRIBE #1 NOTE: I, Cliff Marquez, am scribing for, and in the presence of, Ozzy Paulson MD. I have scribed the entire note.      History      Chief Complaint   Patient presents with    Generalized Body Aches     pt states, "I was recently diagnosed with MS," pt c/o generalized "throbbing" body pain, unrelieved by gabapentin       Review of patient's allergies indicates:   Allergen Reactions    Cat/feline products     Chocolate flavor Itching        HPI   HPI    5/6/2018, 7:12 AM   History obtained from the patient      History of Present Illness: Logan Haywood is a 47 y.o. male patient who presents to the Emergency Department for an evaluation of generalized body aches which onset gradually a few weeks ago. Pt reports he has been suffering from generalized body aches since being dx with MS. Pt describes the pain as a "throbbing" sensation. Pt reports he is Rx gabapentin for his sx but denies any relief stating he has even begun to take excessive amounts of his meds with no relief. Pt states he only got relief after his last Ed visit when he was prescribed pain medication. Symptoms are constant and moderate in severity. Exacerbated by nothing and relieved by pain medication. No associated sxs reported. Patient denies any fever, chills, CP, SOB, abd pan, N/V/D, and all other sxs at this time. Pt denies tx PTA. No further complaints or concerns at this time.       Arrival mode: Personal vehicle    PCP: Angel Simon MD       Past Medical History:  No past medical history on file.    Past Surgical History:  Past Surgical History:   Procedure Laterality Date    TONSILLECTOMY           Family History:  Family History   Problem Relation Age of Onset    Hypertension Mother     COPD Mother     Heart disease Father     Diabetes Sister     Stroke Brother        Social History:  Social History     Social History Main Topics    Smoking status: Current Every Day Smoker     Packs/day: 1.00     Types: " Cigarettes    Smokeless tobacco: Unknown    Alcohol use Yes      Comment: seldom    Drug use: No    Sexual activity: Unknown       ROS   Review of Systems   Constitutional: Negative for chills and fever.        (+) Generalized body aches   HENT: Negative for congestion and sore throat.    Respiratory: Negative for cough and shortness of breath.    Cardiovascular: Negative for chest pain.   Gastrointestinal: Negative for abdominal pain, nausea and vomiting.   Genitourinary: Negative for dysuria and hematuria.   Musculoskeletal: Negative for back pain.   Skin: Negative for rash.   Neurological: Negative for dizziness, weakness, light-headedness and headaches.   Hematological: Does not bruise/bleed easily.     Physical Exam      Initial Vitals   BP Pulse Resp Temp SpO2   -- -- -- -- --      MAP       --          Physical Exam  Nursing Notes and Vital Signs Reviewed.  Constitutional: Patient is in no acute distress. Well-developed and well-nourished.  Head: Atraumatic. Normocephalic.  Eyes: PERRL. EOM intact. Conjunctivae are not pale. No scleral icterus.  ENT: Mucous membranes are moist. Oropharynx is clear and symmetric.    Neck: Supple. Full ROM. No lymphadenopathy.  Cardiovascular: Regular rate. Regular rhythm. No murmurs, rubs, or gallops. Distal pulses are 2+ and symmetric.  Pulmonary/Chest: No respiratory distress. Clear to auscultation bilaterally. No wheezing or rales.  Abdominal: Soft and non-distended.  There is no tenderness.   Musculoskeletal: Moves all extremities. No obvious deformities. No edema. No calf tenderness.  Skin: Warm and dry.  Neurological:  Alert, awake, and appropriate.  Normal speech.  No acute focal neurological deficits are appreciated.  Psychiatric: Normal affect. Good eye contact. Appropriate in content.    ED Course    Procedures  ED Vital Signs:  Vitals:    05/06/18 0720   BP: 113/79   Pulse: 89   Resp: 20   Temp: 97.7 °F (36.5 °C)   TempSrc: Oral   SpO2: 97%   Weight: 75 kg (165  "lb 7.3 oz)   Height: 5' 4" (1.626 m)       Abnormal Lab Results:  Labs Reviewed   CBC W/ AUTO DIFFERENTIAL - Abnormal; Notable for the following:        Result Value    Eos # 1.1 (*)     Eosinophil% 16.5 (*)     All other components within normal limits   COMPREHENSIVE METABOLIC PANEL - Abnormal; Notable for the following:     ALT 62 (*)     All other components within normal limits   URINALYSIS - Abnormal; Notable for the following:     Specific Gravity, UA >=1.030 (*)     All other components within normal limits   INFLUENZA A AND B ANTIGEN        All Lab Results:  Results for orders placed or performed during the hospital encounter of 05/06/18   CBC auto differential   Result Value Ref Range    WBC 6.43 3.90 - 12.70 K/uL    RBC 4.68 4.60 - 6.20 M/uL    Hemoglobin 14.5 14.0 - 18.0 g/dL    Hematocrit 42.4 40.0 - 54.0 %    MCV 91 82 - 98 fL    MCH 31.0 27.0 - 31.0 pg    MCHC 34.2 32.0 - 36.0 g/dL    RDW 14.0 11.5 - 14.5 %    Platelets 165 150 - 350 K/uL    MPV 9.5 9.2 - 12.9 fL    Gran # (ANC) 3.7 1.8 - 7.7 K/uL    Lymph # 1.2 1.0 - 4.8 K/uL    Mono # 0.5 0.3 - 1.0 K/uL    Eos # 1.1 (H) 0.0 - 0.5 K/uL    Baso # 0.02 0.00 - 0.20 K/uL    Gran% 57.7 38.0 - 73.0 %    Lymph% 18.2 18.0 - 48.0 %    Mono% 7.3 4.0 - 15.0 %    Eosinophil% 16.5 (H) 0.0 - 8.0 %    Basophil% 0.3 0.0 - 1.9 %    Differential Method Automated    Comprehensive metabolic panel   Result Value Ref Range    Sodium 142 136 - 145 mmol/L    Potassium 3.8 3.5 - 5.1 mmol/L    Chloride 108 95 - 110 mmol/L    CO2 25 23 - 29 mmol/L    Glucose 98 70 - 110 mg/dL    BUN, Bld 12 6 - 20 mg/dL    Creatinine 0.8 0.5 - 1.4 mg/dL    Calcium 9.0 8.7 - 10.5 mg/dL    Total Protein 6.9 6.0 - 8.4 g/dL    Albumin 4.0 3.5 - 5.2 g/dL    Total Bilirubin 0.4 0.1 - 1.0 mg/dL    Alkaline Phosphatase 60 55 - 135 U/L    AST 32 10 - 40 U/L    ALT 62 (H) 10 - 44 U/L    Anion Gap 9 8 - 16 mmol/L    eGFR if African American >60 >60 mL/min/1.73 m^2    eGFR if non  >60 >60 " mL/min/1.73 m^2   Urinalysis   Result Value Ref Range    Specimen UA Urine, Clean Catch     Color, UA Yellow Yellow, Straw, Dara    Appearance, UA Clear Clear    pH, UA 6.0 5.0 - 8.0    Specific Gravity, UA >=1.030 (A) 1.005 - 1.030    Protein, UA Negative Negative    Glucose, UA Negative Negative    Ketones, UA Negative Negative    Bilirubin (UA) Negative Negative    Occult Blood UA Negative Negative    Nitrite, UA Negative Negative    Urobilinogen, UA Negative <2.0 EU/dL    Leukocytes, UA Negative Negative   Influenza antigen Nasopharyngeal Swab   Result Value Ref Range    Influenza A Ag, EIA Negative Negative    Influenza B Ag, EIA Negative Negative    Flu A & B Source Nasopharyngeal Swab               The Emergency Provider reviewed the vital signs and test results, which are outlined above.    ED Discussion     8:52 AM: Reassessed pt at this time. Pt is awake, alert, and in NAD. Pt states his condition has improved at this time. Discussed with pt all pertinent ED information and results. Discussed pt dx and plan of tx. Gave pt all f/u and return to the ED instructions. All questions and concerns were addressed at this time. Pt expresses understanding of information and instructions, and is comfortable with plan to discharge. Pt is stable for discharge.    I discussed with patient and/or family/caretaker that evaluation in the ED does not suggest any emergent or life threatening medical conditions requiring immediate intervention beyond what was provided in the ED, and I believe patient is safe for discharge.  Regardless, an unremarkable evaluation in the ED does not preclude the development or presence of a serious of life threatening condition. As such, patient was instructed to return immediately for any worsening or change in current symptoms.      ED Medication(s):  Medications   hydrocodone-acetaminophen 5-325mg per tablet 1 tablet (1 tablet Oral Given 5/6/18 0730)   morphine injection 4 mg (4 mg  Intravenous Given 5/6/18 0901)       New Prescriptions    OXYCODONE-ACETAMINOPHEN (PERCOCET) 5-325 MG PER TABLET    Take 1 tablet by mouth every 4 (four) hours as needed for Pain.       Follow-up Information     Angel Simon MD In 2 days.    Specialty:  Family Medicine  Contact information:  [address] [address] [place] [address]  [phone]                     Medical Decision Making              Scribe Attestation:   Scribe #1: I performed the above scribed service and the documentation accurately describes the services I performed. I attest to the accuracy of the note.    Attending:   Physician Attestation Statement for Scribe #1: I, Ozzy Paulson MD, personally performed the services described in this documentation, as scribed by Cliff Marquez, in my presence, and it is both accurate and complete.          Clinical Impression       ICD-10-CM ICD-9-CM   1. Myalgia M79.1 729.1       Disposition:   Disposition: Discharged  Condition: Stable         Ozzy Paulson MD  05/06/18 0907

## 2018-05-09 ENCOUNTER — CLINICAL SUPPORT (OUTPATIENT)
Dept: SMOKING CESSATION | Facility: CLINIC | Age: 48
End: 2018-05-09
Payer: COMMERCIAL

## 2018-05-09 DIAGNOSIS — F17.210 MODERATE SMOKER (20 OR LESS PER DAY): Primary | ICD-10-CM

## 2018-05-09 PROCEDURE — 99404 PREV MED CNSL INDIV APPRX 60: CPT | Mod: S$GLB,,,

## 2018-05-09 PROCEDURE — 99999 PR PBB SHADOW E&M-EST. PATIENT-LVL I: CPT | Mod: PBBFAC,,,

## 2018-05-09 RX ORDER — BUPROPION HYDROCHLORIDE 150 MG/1
150 TABLET, EXTENDED RELEASE ORAL 2 TIMES DAILY
Qty: 60 TABLET | Refills: 0 | Status: SHIPPED | OUTPATIENT
Start: 2018-05-09 | End: 2018-06-14

## 2018-05-09 RX ORDER — IBUPROFEN 200 MG
1 TABLET ORAL DAILY
Qty: 28 PATCH | Refills: 0 | Status: SHIPPED | OUTPATIENT
Start: 2018-05-09 | End: 2018-06-14

## 2018-05-09 NOTE — Clinical Note
Patient seen for the tobacco cessation program. He is a cigarette smoker of 1 PPD X 32 years. Patient has a recent diagnosis of muscular dystrophy and wants to stop smoking for his health.  He is motivated to quit the use of tobacco and has agreed to attend our 6 week tobacco cessation program.

## 2018-05-10 NOTE — PROGRESS NOTES
Patient seen for the tobacco cessation program. He is a cigarette smoker of 1 PPD X 32 years. Patient has a recent diagnosis of multiple sclerosis and wants to stop smoking for his health.  He is motivated to quit the use of tobacco and has agreed to attend our 6 week tobacco cessation program.

## 2018-05-23 ENCOUNTER — TELEPHONE (OUTPATIENT)
Dept: NEUROLOGY | Facility: CLINIC | Age: 48
End: 2018-05-23

## 2018-05-23 ENCOUNTER — DOCUMENTATION ONLY (OUTPATIENT)
Dept: NEUROLOGY | Facility: CLINIC | Age: 48
End: 2018-05-23

## 2018-05-23 NOTE — TELEPHONE ENCOUNTER
----- Message from Evan Munoz sent at 5/23/2018 11:49 AM CDT -----  Contact: Tyler Lopez @[phone]  Caller is calling to inform that pt has been without medication (dimethyl fumarate (TECFIDERA) 240 mg CpDR )  for a month he's experiencing an MS flair caller advise pt to go to the ER he declined.

## 2018-05-23 NOTE — TELEPHONE ENCOUNTER
"Call placed to [facility]. [facility] states that pt received a follow up call from the pharmacy nurse. While talking with the pt, the nurse stated that pt reported more difficulty concentration and word finding. States she told the pt that it sounded like an MS flair and that he should contact his doctor's office. States he refused as he has appt scheduled on 5/24/18 and that it is not necessarily a new issue. Inquired as to why pt not started on Tecfidera. [facility] states that there were some "insurance issues" in April. They contacted the pt on 4/27 to ship out medication, but he informed them that he was going on Medicaid. Pt now has Medicaid, and they are unable to fill it as they are not in network. When inquired as to why prescription was not filled nor was contact made with the office in regards to issues with rx, [facility] wasn't able to provide answers.    Call placed to [facility] and spoke with Maurice. He states that pt was provided/shipped the Quick Start pack at the beginning of April (2 wk supply). Nurse educator spoke with pt in mid-April and pt confirmed he was taking the starter pack. States that no other updates are in pt's record until recently when they began receiving calls from [facility] that pt has not received medication. Another starter pack has been shipped to pt yesterday. They will continue to provide medication for 2 weeks at a time until resolved. Rx has been sent to [facility].    PA initiated for Tecfidera via [email or web address]  "

## 2018-05-23 NOTE — PROGRESS NOTES
Fax received from [facility]. Tecfidera 240mg PA has been approved from 5/23/18-5/23/19 with request number [id number].

## 2018-05-24 ENCOUNTER — LAB VISIT (OUTPATIENT)
Dept: LAB | Facility: HOSPITAL | Age: 48
End: 2018-05-24
Attending: PSYCHIATRY & NEUROLOGY
Payer: MEDICAID

## 2018-05-24 ENCOUNTER — OFFICE VISIT (OUTPATIENT)
Dept: NEUROLOGY | Facility: CLINIC | Age: 48
End: 2018-05-24
Payer: MEDICAID

## 2018-05-24 VITALS
SYSTOLIC BLOOD PRESSURE: 140 MMHG | HEIGHT: 64 IN | WEIGHT: 155.88 LBS | BODY MASS INDEX: 26.61 KG/M2 | HEART RATE: 107 BPM | DIASTOLIC BLOOD PRESSURE: 82 MMHG

## 2018-05-24 DIAGNOSIS — F32.9 REACTIVE DEPRESSION: ICD-10-CM

## 2018-05-24 DIAGNOSIS — N31.9 NEUROGENIC BLADDER: ICD-10-CM

## 2018-05-24 DIAGNOSIS — R53.83 OTHER FATIGUE: ICD-10-CM

## 2018-05-24 DIAGNOSIS — R20.2 NUMBNESS AND TINGLING: ICD-10-CM

## 2018-05-24 DIAGNOSIS — R20.0 NUMBNESS AND TINGLING: ICD-10-CM

## 2018-05-24 DIAGNOSIS — G35 MULTIPLE SCLEROSIS: Primary | ICD-10-CM

## 2018-05-24 DIAGNOSIS — G35 MULTIPLE SCLEROSIS: ICD-10-CM

## 2018-05-24 LAB
25(OH)D3+25(OH)D2 SERPL-MCNC: 34 NG/ML
ALBUMIN SERPL BCP-MCNC: 4.7 G/DL
ALP SERPL-CCNC: 92 U/L
ALT SERPL W/O P-5'-P-CCNC: 21 U/L
ANION GAP SERPL CALC-SCNC: 10 MMOL/L
AST SERPL-CCNC: 16 U/L
BASOPHILS # BLD AUTO: 0.04 K/UL
BASOPHILS NFR BLD: 0.4 %
BILIRUB SERPL-MCNC: 0.9 MG/DL
BUN SERPL-MCNC: 9 MG/DL
CALCIUM SERPL-MCNC: 10.5 MG/DL
CHLORIDE SERPL-SCNC: 103 MMOL/L
CO2 SERPL-SCNC: 27 MMOL/L
CREAT SERPL-MCNC: 1 MG/DL
DIFFERENTIAL METHOD: ABNORMAL
EOSINOPHIL # BLD AUTO: 0.1 K/UL
EOSINOPHIL NFR BLD: 0.6 %
ERYTHROCYTE [DISTWIDTH] IN BLOOD BY AUTOMATED COUNT: 12.5 %
EST. GFR  (AFRICAN AMERICAN): >60 ML/MIN/1.73 M^2
EST. GFR  (NON AFRICAN AMERICAN): >60 ML/MIN/1.73 M^2
GLUCOSE SERPL-MCNC: 97 MG/DL
HCT VFR BLD AUTO: 52.7 %
HGB BLD-MCNC: 17.7 G/DL
IMM GRANULOCYTES # BLD AUTO: 0.07 K/UL
IMM GRANULOCYTES NFR BLD AUTO: 0.7 %
LYMPHOCYTES # BLD AUTO: 1.6 K/UL
LYMPHOCYTES NFR BLD: 15.9 %
MCH RBC QN AUTO: 30.2 PG
MCHC RBC AUTO-ENTMCNC: 33.6 G/DL
MCV RBC AUTO: 90 FL
MONOCYTES # BLD AUTO: 0.6 K/UL
MONOCYTES NFR BLD: 6.5 %
NEUTROPHILS # BLD AUTO: 7.4 K/UL
NEUTROPHILS NFR BLD: 75.9 %
NRBC BLD-RTO: 0 /100 WBC
PLATELET # BLD AUTO: 274 K/UL
PMV BLD AUTO: 9.9 FL
POTASSIUM SERPL-SCNC: 4.2 MMOL/L
PROT SERPL-MCNC: 8.5 G/DL
RBC # BLD AUTO: 5.86 M/UL
SODIUM SERPL-SCNC: 140 MMOL/L
WBC # BLD AUTO: 9.76 K/UL

## 2018-05-24 PROCEDURE — 82306 VITAMIN D 25 HYDROXY: CPT

## 2018-05-24 PROCEDURE — 85025 COMPLETE CBC W/AUTO DIFF WBC: CPT

## 2018-05-24 PROCEDURE — 86359 T CELLS TOTAL COUNT: CPT

## 2018-05-24 PROCEDURE — 99214 OFFICE O/P EST MOD 30 MIN: CPT | Mod: S$PBB,,, | Performed by: PSYCHIATRY & NEUROLOGY

## 2018-05-24 PROCEDURE — 99999 PR PBB SHADOW E&M-EST. PATIENT-LVL IV: CPT | Mod: PBBFAC,,, | Performed by: PSYCHIATRY & NEUROLOGY

## 2018-05-24 PROCEDURE — 36415 COLL VENOUS BLD VENIPUNCTURE: CPT

## 2018-05-24 PROCEDURE — 86360 T CELL ABSOLUTE COUNT/RATIO: CPT

## 2018-05-24 PROCEDURE — 80053 COMPREHEN METABOLIC PANEL: CPT

## 2018-05-24 PROCEDURE — 99214 OFFICE O/P EST MOD 30 MIN: CPT | Mod: PBBFAC | Performed by: PSYCHIATRY & NEUROLOGY

## 2018-05-24 NOTE — PROGRESS NOTES
Subjective:       Patient ID: Logan Haywood is a 47 y.o. male who presents today for a routine clinic visit for MS followup.      MS HPI- initial note 3/2018 below:  47 y.o. RH male with history of mood disorders and substance abuse, who presents to clinic for MS evaluation. In speaking to patient, he would not like to completely transfer care. Instead, he'd like to come for visit 1-2x year for check up on MS. Fine with keeping current provider as primary neurologist if possible.  At least for past 4-5 years, he has had numbness and painful tingling in feet and restless leg movements at night. Has always had issues with fatigue. No prior weakness.  He was recently admitted to hospital 2/2018 and diagnosed with MS. Initially presented 2/27 to [facility] ED with numbness and weakness to the left side of body along with left-sided pain radiating down left body w/o association with neck flexion. MRI brain demonstrated multiple elliptical foci of T2 hyperintensity perpendicular to the lateral ventricles and some subcortical in both frontal lobes, most consistent with demyelination. There was one enhancing lesion in the subcortical region of the lateral left temporal lobe. He also had several lesions in C-spine but only one enhancing at C2. He was advised to stay for LP and IV steroids, but patient chose to leave AMA. He returned to ED 2/27 for rest of w/u and treatment. He was treated with IV steroids and started on paroxetine for depression as he reported SI and clonazepam for restless legs/sleep.  Since discharge, he was seen by Dr. Baeza for follow-up and treatment with Rebif was discussed with patient. He states that he may have right hand with tiny bit of numbness in fingertips, but no new significant symptoms. When he lays down on left side, he feels altered sensation like there's a pillow between him.       TODAY/SUBJECTIVE:  · DMT: Started Tecfidera 3/2018, but out of medication after 2 week period. Just  "reapproved 5/23/18 - 5/23/19. States that he didn't know to call when he ran out.  · Side effects from DMT? States that he doesn't know of any  · Taking vitamin D3 as recommended? No, completed the high dose therapy, but reports that he doesn't have money to buy OTC medication. Dose: n/a  · Has applied for social security.   · Can't sleep due pain in left side, back (left mid-back) and feet. Back feels better if he pushes in on it. Feels like a "throbbing" or burning pain.  Tried gabapentin 400mg tid (from sister). Went to see pain specialist who wanted him to trial higher doses of GBP or steroid injections, but he refused. Asking for narcotic agents today, which we discussed that I do not provide.  · Still has numbness on left side and tips of right fingers getting numb.  · Fatigue all the time. Even daily activities makes it worse.  · Started stuttering with he gets nervous.  · Still having urge bowel and bladder incontinence.  · Still doing rubber band therapy but doesn't want to do PT/OT again    SOCIAL HISTORY  Social History   Substance Use Topics    Smoking status: Current Every Day Smoker     Packs/day: 1.00     Years: 32.00     Types: Cigarettes    Smokeless tobacco: Never Used    Alcohol use Yes      Comment: seldom     Living arrangements - the patient currently stays with brother, but states that this is temporary. Can stay with mother if needed.  Employment: Unemployed. Has applied for social security in meantime.    Review of systems:   · Fatigue: Yes - "severe"   · Sleep Disturbance: Yes - no prior sleep study   · Bladder Dysfunction: No retention. Polyuria. Urge incontinence.  · Bowel Dysfunction: Yes - near accidents. 3 months prior to ED presentation, with bowel incontinence   · Spasticity: No   · Visual Symptoms: Yes - wears glasses, feels eyesight is worsening. No ophthalmologist   · Cognitive: Yes - sometimes with difficulty getting words out   · Mood Disorder: Yes - worry a lot, considered " "suicide, decreased appetite, anxiety, loss of energy. States that suicidal thoughts are "passing thoughts" and would never act on it.   · Gait Disturbance: Yes - unsteady due to left leg weakness, bump into walls   · Falls: No  · Hand Dysfunction: Yes - left hand weakness, cannot  anything  · Pain: Yes - neck pain, back pain (near left shoulder blade), aching joints, plantar fasciitis in bilateral feet   · Sexual Dysfunction: Not Assessed   · Skin Breakdown: Yes - hair loss   · Tremors: No   · Dysphagia: Yes - difficulty getting food down.   · Dysarthria: No but occasionally will trip on words  · Heat sensitivity: Yes - increased fatigue   · Any un-met adaptive needs? No, refuses further pt/ot  · Copay Assist? Yes        Objective:        1. 25 foot timed walk: 5.78 sec w/o assistive device (previously 8.26 sec w/o assistive device)  No flowsheet data found.  2. SDMT:  3. 9 Hole Peg Test:  No flowsheet data found.    Neurologic Exam      MENTAL STATUS: grossly intact. Normal language, attention, and memory.   CRANIAL NERVE EXAM: Extraocular muscles are intact.  No facial asymmetry. tongue midline. Shoulder shrug normal b/l. There is no dysarthria.   MOTOR EXAM: Normal bulk and tone throughout UE and LE bilaterally. Rapid sequential movements are normal. Strength is 5/5 in all groups in the lower extremities and upper extremities except 5- left deltoid and HF.   SENSORY EXAM: decreased LT left arm compared to right and back of head feels numb  COORDINATION: Normal finger-to-nose exam.   GAIT: Narrow based. A little unstable.      Imaging:       Results for orders placed during the hospital encounter of 02/26/18   MRI Cervical Spine W WO Cont    Impression      There are four separate foci of abnormal T2 signal within the cervical cord as discussed above.  The one at the level of C2 demonstrates abnormal enhancement.  These findings are concerning for demyelinating disease such as multiple sclerosis.    Abnormal " enhancement may be seen in active demyelination.      Electronically signed by: GAMALIEL LAMBERT M.D.  Date:     02/26/18  Time:    17:05      No new imaging      Labs:     No results found for: [id number]  No results found for: JCVINDEX, JCVANTIBODY  Lab Results   Component Value Date    AI1FWJIN 72.2 03/19/2018    ABSOLUTECD3 2639 (H) 03/19/2018    NK6OSIEK 19.4 03/19/2018    ABSOLUTECD8 707 03/19/2018    MK6JIWMZ 51.8 03/19/2018    ABSOLUTECD4 1893 (H) 03/19/2018    LABCD48 2.68 03/19/2018     Lab Results   Component Value Date    WBC 6.43 05/06/2018    RBC 4.68 05/06/2018    HGB 14.5 05/06/2018    HCT 42.4 05/06/2018    MCV 91 05/06/2018    MCH 31.0 05/06/2018    MCHC 34.2 05/06/2018    RDW 14.0 05/06/2018     05/06/2018    MPV 9.5 05/06/2018    GRAN 3.7 05/06/2018    GRAN 57.7 05/06/2018    LYMPH 1.2 05/06/2018    LYMPH 18.2 05/06/2018    MONO 0.5 05/06/2018    MONO 7.3 05/06/2018    EOS 1.1 (H) 05/06/2018    BASO 0.02 05/06/2018    EOSINOPHIL 16.5 (H) 05/06/2018    BASOPHIL 0.3 05/06/2018       Chemistry        Component Value Date/Time     05/06/2018 0750    K 3.8 05/06/2018 0750     05/06/2018 0750    CO2 25 05/06/2018 0750    BUN 12 05/06/2018 0750    CREATININE 0.8 05/06/2018 0750    GLU 98 05/06/2018 0750        Component Value Date/Time    CALCIUM 9.0 05/06/2018 0750    ALKPHOS 60 05/06/2018 0750    AST 32 05/06/2018 0750    ALT 62 (H) 05/06/2018 0750    BILITOT 0.4 05/06/2018 0750    ESTGFRAFRICA >60 05/06/2018 0750    EGFRNONAA >60 05/06/2018 0750              Diagnosis/Assessment/Plan:   1. Multiple sclerosis  · Assessment: Diagnosed 2/2018 based on clinical history, MRI, and CSF studies. Started on Tecfidera, which he seems to tolerate, but out of medication for one month. No concern for new lesions at this time; exam actually improved, just not at baseline. We discussed realistic expectations of healing and recovery.  · Imaging: no new imaging at this time. Will need repeat brain  MRI in 6 months +/-spine imaging if new significant symptoms.  · Labs: DMT safety labs ordered  · DMT: continue Tecfidera, encouraged phoning in if any disruptions in medication  · The the patient was counseled about the importance of vitamin D supplementation in multiple sclerosis, and the fact that recent data suggests that high circulating blood levels of vitamin D has an ameliorating effect on the disease course in multiple sclerosis in general.     2. MS Symptomatic management:  · Fatigue/sleep: still may benefit from sleep study, but patient overwhelmed with testing  · Bladder dysfunction: bowel/bladder incontinence. Urology referral  · Visual symptoms: will need yearly eye exam  · Cognitive: may need neuropsych testing in future but suspect mood and sleep disorder contributing  · Mood disorder: unclear if primary or secondary to substance use. Will re-evaluate next visit. Given social work phone number to call for help with bills and adjustment to new diagnosis.  · Pain: encouraged follow up with pain clinic. Does not want to continue GBP.  · Gait disturbance: completed some outpatient PT/OT but stopped and does not want to continue at this time      Over 50% of this 40 minute visit was spent in direct face to face counseling of the patient about MS, DMT management, and MS symptom management.         There are no diagnoses linked to this encounter.

## 2018-05-25 LAB
ABSOLUTE CD3: 1274 CELLS/UL (ref 700–2100)
ABSOLUTE CD8: 385 CELLS/UL (ref 200–900)
CD3%: 73.1 % (ref 55–83)
CD3+CD4+ CELLS # BLD: 860 CELLS/UL (ref 300–1400)
CD3+CD4+ CELLS NFR BLD: 49.3 % (ref 28–57)
CD4/CD8 RATIO: 2.24 (ref 0.9–3.6)
CD8 % SUPPRESSOR T CELL: 22.1 % (ref 10–39)

## 2018-05-25 RX ORDER — GABAPENTIN 100 MG/1
CAPSULE ORAL
Qty: 90 CAPSULE | Refills: 0 | OUTPATIENT
Start: 2018-05-25

## 2018-05-30 ENCOUNTER — DOCUMENTATION ONLY (OUTPATIENT)
Dept: NEUROLOGY | Facility: CLINIC | Age: 48
End: 2018-05-30

## 2018-05-30 NOTE — PROGRESS NOTES
Faxed Urgent Prescription Request for Tecfidera to [facility] - [phone]

## 2018-06-11 ENCOUNTER — PATIENT OUTREACH (OUTPATIENT)
Dept: ADMINISTRATIVE | Facility: HOSPITAL | Age: 48
End: 2018-06-11

## 2018-06-11 NOTE — PROGRESS NOTES
Called to reschedule missed lab appointment. Schedule lab appointment on 06/14/18 at 09:20 am. I also schedule a follow up appointment on the same day at 11:50 am. Patient in agreement and vocalize understanding. I will send appointment reminder in the mail today.

## 2018-06-14 ENCOUNTER — OFFICE VISIT (OUTPATIENT)
Dept: INTERNAL MEDICINE | Facility: CLINIC | Age: 48
End: 2018-06-14
Payer: MEDICAID

## 2018-06-14 VITALS
BODY MASS INDEX: 27.25 KG/M2 | WEIGHT: 159.63 LBS | HEART RATE: 92 BPM | TEMPERATURE: 98 F | OXYGEN SATURATION: 98 % | HEIGHT: 64 IN | RESPIRATION RATE: 20 BRPM | SYSTOLIC BLOOD PRESSURE: 118 MMHG | DIASTOLIC BLOOD PRESSURE: 84 MMHG

## 2018-06-14 DIAGNOSIS — F32.A DEPRESSION, UNSPECIFIED DEPRESSION TYPE: ICD-10-CM

## 2018-06-14 DIAGNOSIS — G89.29 OTHER CHRONIC PAIN: ICD-10-CM

## 2018-06-14 DIAGNOSIS — G35 MS (MULTIPLE SCLEROSIS): Primary | ICD-10-CM

## 2018-06-14 DIAGNOSIS — H53.8 BLURRY VISION: ICD-10-CM

## 2018-06-14 PROCEDURE — 99999 PR PBB SHADOW E&M-EST. PATIENT-LVL V: CPT | Mod: PBBFAC,,, | Performed by: FAMILY MEDICINE

## 2018-06-14 PROCEDURE — 99215 OFFICE O/P EST HI 40 MIN: CPT | Mod: PBBFAC | Performed by: FAMILY MEDICINE

## 2018-06-14 PROCEDURE — 99213 OFFICE O/P EST LOW 20 MIN: CPT | Mod: S$PBB,,, | Performed by: FAMILY MEDICINE

## 2018-06-14 RX ORDER — ESCITALOPRAM OXALATE 10 MG/1
10 TABLET ORAL DAILY
Qty: 30 TABLET | Refills: 0 | Status: SHIPPED | OUTPATIENT
Start: 2018-06-14 | End: 2018-07-11 | Stop reason: SDUPTHER

## 2018-06-14 NOTE — PATIENT INSTRUCTIONS
Dr. Simon Instructions      You need to find a pain management doctor.  I will try for [facility] but medicaid complicates this and I dont' know when you will be able to see him.    Call insurance company and try and find who would take your insurance for pain management.  If find someone let me know I will place an external consultation request. ( referral)    Return in 1 month for depression medicine evaluation ( that is the lexapro ).    I will not prescribe long term pain medicine.  I will consider short term if you are honest with me and have a clean drug test.

## 2018-06-14 NOTE — PROGRESS NOTES
Subjective:       Patient ID: Logan Haywood is a 47 y.o. male.    Chief Complaint: Care coordination visit (med refill)    HPI     46 yo M    Recent diagnosis of MS.  Depression.    Reports hurts all over and fatigues easily.  Even walking back and forth from fridge needs to rest prior to eating.    Sleep is disturbed due to pain.     Follows Dr. Mills in [facility]  Follows Dr. Baeza as local neurologist.    Staying with Mother.  Money is an issue.    Not on pain medicine.  He cannot sleep because of the pain.    Not using drugs.    Patient stopped using his depression medicine.        Review of Systems   Constitutional: Negative for chills and fever.   HENT: Negative for trouble swallowing.    Eyes: Negative for visual disturbance.   Respiratory: Negative for shortness of breath.    Cardiovascular: Negative for chest pain.   Genitourinary: Negative for difficulty urinating.   Skin: Negative for rash.   Neurological: Positive for dizziness and weakness. Negative for light-headedness.   Psychiatric/Behavioral: Positive for dysphoric mood.       Objective:       Vitals:    06/14/18 0926   BP: 118/84   Pulse: 92   Resp: 20   Temp: 97.5 °F (36.4 °C)       Physical Exam   Constitutional: He is oriented to person, place, and time. He appears well-developed and well-nourished. No distress.   HENT:   Head: Normocephalic and atraumatic.   Right Ear: Hearing, tympanic membrane, external ear and ear canal normal.   Left Ear: Hearing, tympanic membrane, external ear and ear canal normal.   Nose: Nose normal. Right sinus exhibits no maxillary sinus tenderness and no frontal sinus tenderness. Left sinus exhibits no maxillary sinus tenderness and no frontal sinus tenderness.   Mouth/Throat: Uvula is midline, oropharynx is clear and moist and mucous membranes are normal.   Eyes: Conjunctivae are normal. Right eye exhibits no discharge. Left eye exhibits no discharge.   Neck: Trachea normal, normal range of motion and full passive  range of motion without pain.   Cardiovascular: Normal rate, regular rhythm, normal heart sounds and intact distal pulses.    Pulmonary/Chest: Effort normal and breath sounds normal. No respiratory distress. He has no decreased breath sounds. He has no wheezes.   Abdominal: Soft. Normal appearance and bowel sounds are normal. He exhibits no distension and no mass. There is no tenderness. There is no guarding. No hernia.   Musculoskeletal: Normal range of motion. He exhibits no edema or deformity.   Lymphadenopathy:     He has no cervical adenopathy.   Neurological: He is alert and oriented to person, place, and time.   Skin: Skin is warm, dry and intact. No rash noted. No erythema. No pallor.   Psychiatric: He has a normal mood and affect. His speech is normal and behavior is normal. Thought content normal.       Assessment:       1. MS (multiple sclerosis)    2. Other chronic pain    3. Depression, unspecified depression type        Plan:   MS (multiple sclerosis)    Other chronic pain  -     Ambulatory consult to Pain Clinic  -     POCT Urine Drug Screen Pain Management  -     Pain Clinic Drug Screen    Depression, unspecified depression type    Other orders  -     escitalopram oxalate (LEXAPRO) 10 MG tablet; Take 1 tablet (10 mg total) by mouth once daily.  Dispense: 30 tablet; Refill: 0      Follow up in 1 month.    As I was leaving room patient confessed to taking his mother's pain medicine. This was in contradiction to what he told me earlier he had not used any illicit substances. This changed the entire plan we decided on together. I will see him next month. I will order a UDS. If it is clean and he can verify he has not taken other peoples medicine or drugs we can consider tramadol. I informed him this is only until he can see pain management as I will not write for tramadol on a chronic basis. He is to call insurance company for pain mngt MD options  if [facility]'s wait is too long. Patient has compliance  "issues is not taking what he is meant to be taking and I feel does not want to interact with doctors or health care systems. This complicates his chronic diagnosis, but I emphasized he must try and follow medical advise.       I do want his depression to be improved upon. He had stopped taking his anti-depressants -citing " he was out of refills " I told him that is not an adequate excuse for an competent adult. I feel he knows this and knows that he should have contacted any of his MDs if he was going to run out of medications. I emphasized danger of cold turkey quitting certain medications.      No Follow-up on file.  "

## 2018-06-20 ENCOUNTER — PATIENT MESSAGE (OUTPATIENT)
Dept: INTERNAL MEDICINE | Facility: CLINIC | Age: 48
End: 2018-06-20

## 2018-06-21 DIAGNOSIS — F17.210 MODERATE SMOKER (20 OR LESS PER DAY): ICD-10-CM

## 2018-06-21 RX ORDER — BUPROPION HYDROCHLORIDE 150 MG/1
150 TABLET, EXTENDED RELEASE ORAL 2 TIMES DAILY
Qty: 60 TABLET | Refills: 0 | Status: CANCELLED | OUTPATIENT
Start: 2018-06-21 | End: 2019-06-21

## 2018-06-28 ENCOUNTER — PATIENT OUTREACH (OUTPATIENT)
Dept: ADMINISTRATIVE | Facility: HOSPITAL | Age: 48
End: 2018-06-28

## 2018-07-12 ENCOUNTER — OFFICE VISIT (OUTPATIENT)
Dept: INTERNAL MEDICINE | Facility: CLINIC | Age: 48
End: 2018-07-12
Payer: MEDICAID

## 2018-07-12 VITALS
OXYGEN SATURATION: 98 % | HEART RATE: 73 BPM | RESPIRATION RATE: 20 BRPM | HEIGHT: 64 IN | TEMPERATURE: 98 F | DIASTOLIC BLOOD PRESSURE: 84 MMHG | BODY MASS INDEX: 27.63 KG/M2 | WEIGHT: 161.81 LBS | SYSTOLIC BLOOD PRESSURE: 122 MMHG

## 2018-07-12 DIAGNOSIS — G35 MS (MULTIPLE SCLEROSIS): Primary | ICD-10-CM

## 2018-07-12 DIAGNOSIS — G25.81 RESTLESS LEG: ICD-10-CM

## 2018-07-12 DIAGNOSIS — G89.29 OTHER CHRONIC PAIN: ICD-10-CM

## 2018-07-12 LAB
AMPHET+METHAMPHET UR QL: NEGATIVE
BARBITURATES UR QL SCN>200 NG/ML: NEGATIVE
BENZODIAZ UR QL SCN>200 NG/ML: NEGATIVE
BZE UR QL SCN: NORMAL
CANNABINOIDS UR QL SCN: NEGATIVE
CREAT UR-MCNC: 24 MG/DL
ETHANOL UR-MCNC: <10 MG/DL
METHADONE UR QL SCN>300 NG/ML: NEGATIVE
OPIATES UR QL SCN: NEGATIVE
PCP UR QL SCN>25 NG/ML: NEGATIVE
TOXICOLOGY INFORMATION: NORMAL

## 2018-07-12 PROCEDURE — 99999 PR PBB SHADOW E&M-EST. PATIENT-LVL III: CPT | Mod: PBBFAC,,, | Performed by: FAMILY MEDICINE

## 2018-07-12 PROCEDURE — 99213 OFFICE O/P EST LOW 20 MIN: CPT | Mod: PBBFAC | Performed by: FAMILY MEDICINE

## 2018-07-12 PROCEDURE — 80307 DRUG TEST PRSMV CHEM ANLYZR: CPT

## 2018-07-12 PROCEDURE — 99213 OFFICE O/P EST LOW 20 MIN: CPT | Mod: S$PBB,,, | Performed by: FAMILY MEDICINE

## 2018-07-12 RX ORDER — ESCITALOPRAM OXALATE 10 MG/1
TABLET ORAL
Qty: 30 TABLET | Refills: 0 | Status: SHIPPED | OUTPATIENT
Start: 2018-07-12 | End: 2018-07-12

## 2018-07-12 RX ORDER — PREGABALIN 75 MG/1
75 CAPSULE ORAL 2 TIMES DAILY
Qty: 60 CAPSULE | Refills: 0 | Status: SHIPPED | OUTPATIENT
Start: 2018-07-12 | End: 2018-09-05 | Stop reason: CLARIF

## 2018-07-12 NOTE — PROGRESS NOTES
Subjective:       Patient ID: Logan Haywood is a 47 y.o. male.    Chief Complaint: Follow-up (1mth,  right hand is numb) and Medication Refill    Medication Refill   Pertinent negatives include no chest pain, chills or fever.        46 yo M    MS    Severe and chronic pain -   Pain won't stop moving at night time.  Legs are problematic and bothersome in bed.    Called his insurance company.  No Pain Mngt. Exist that they cover.    Currently not taking anything for pain.  Occasional ibuprofen.  Doesn't help.    Pain is in back, left side, right hand is numb now.  Not just his left side.     Reports pain can be throbbing.    Has upcoming appt with Dr. Mills in Sept.     Found ambien - has used for last 3 days.  Doesn't help him sleep.  Doesn't stop legs from twitching.  Tried gabapentin - didn't work.   Doesn't know which dose they used.    Has taken 400 mg gabapentin - this did not help.    Went to PT once.  Insurance ran out -   Does not seem interested in returning again           Review of Systems   Constitutional: Negative for chills and fever.   Eyes: Negative for discharge.   Respiratory: Negative for shortness of breath and wheezing.    Cardiovascular: Negative for chest pain and palpitations.   Musculoskeletal: Positive for back pain.   Neurological: Negative for dizziness and light-headedness.   Psychiatric/Behavioral: Positive for dysphoric mood.       Objective:       Vitals:    07/12/18 0923   BP: 122/84   Pulse: 73   Resp: 20   Temp: 98.2 °F (36.8 °C)       Physical Exam   Constitutional: He is oriented to person, place, and time. He appears well-developed and well-nourished. No distress.   HENT:   Head: Normocephalic and atraumatic.   Right Ear: Hearing, tympanic membrane, external ear and ear canal normal.   Left Ear: Hearing, tympanic membrane, external ear and ear canal normal.   Nose: Nose normal. Right sinus exhibits no maxillary sinus tenderness and no frontal sinus tenderness. Left sinus  exhibits no maxillary sinus tenderness and no frontal sinus tenderness.   Mouth/Throat: Uvula is midline, oropharynx is clear and moist and mucous membranes are normal.   Eyes: Conjunctivae are normal. Right eye exhibits no discharge. Left eye exhibits no discharge.   Neck: Trachea normal, normal range of motion and full passive range of motion without pain.   Cardiovascular: Normal rate, regular rhythm, normal heart sounds and intact distal pulses.    Pulmonary/Chest: Effort normal and breath sounds normal. No respiratory distress. He has no decreased breath sounds. He has no wheezes.   Abdominal: Normal appearance.   Musculoskeletal: Normal range of motion.   Lymphadenopathy:     He has no cervical adenopathy.   Neurological: He is alert and oriented to person, place, and time.   Skin: Skin is warm, dry and intact. No rash noted. No erythema. No pallor.   Psychiatric: He has a normal mood and affect. His speech is normal and behavior is normal. Thought content normal.       Assessment:       1. MS (multiple sclerosis)    2. Other chronic pain    3. Restless leg        Plan:   MS (multiple sclerosis)    Restless leg    Trial of 75 mg BID lyrica use.  Can increase to 150 BID after a week if tolerating.    Depression  Increasing lexapro dose      Patient seems miserable. Not necessarily in physical distress, but very low mood. This has been typical of all our encounters. unfortunately the entire encounter today, and at previous visits, is focused solely on pain. I discussed my concern with this and displeasure at having to focus on this alone time and time again - while I worry his other health concerns are not being addressed. His insurance limits his ability to see pain management - and while opioids have worked - which he reminds me at each visit - he is a poor candidate as he has previously had problems with illicit drugs in the past. Patient inquired about Ambien use today - which I declined as it is a  controlled substance and I do not feel is an ideal medication for long term use. I want to address his insomnia by better addressing his underlying depression - we are trying to increase anti-depression medication today.    It is my hope that and lyrica will assist in resting his legs, improving his pain, improving his sleep, and improving his mood.     1 month f/u         No Follow-up on file.

## 2018-07-14 ENCOUNTER — PATIENT MESSAGE (OUTPATIENT)
Dept: INTERNAL MEDICINE | Facility: CLINIC | Age: 48
End: 2018-07-14

## 2018-07-25 ENCOUNTER — TELEPHONE (OUTPATIENT)
Dept: SMOKING CESSATION | Facility: CLINIC | Age: 48
End: 2018-07-25

## 2018-07-25 NOTE — TELEPHONE ENCOUNTER
Attempted to contact patient to follow up tobacco cessation. I was able to leave a detailed message with the following information: Diane Miller, [facility] Tobacco Cessation program and my phone number [phone]. I requested a return call.

## 2018-07-30 ENCOUNTER — CLINICAL SUPPORT (OUTPATIENT)
Dept: SMOKING CESSATION | Facility: CLINIC | Age: 48
End: 2018-07-30
Payer: COMMERCIAL

## 2018-07-30 DIAGNOSIS — F17.200 NICOTINE DEPENDENCE: Primary | ICD-10-CM

## 2018-07-30 PROCEDURE — 99407 BEHAV CHNG SMOKING > 10 MIN: CPT | Mod: S$GLB,,,

## 2018-07-30 NOTE — PROGRESS NOTES
Successful contact with patient regarding tobacco cessation quit #1. Pt states, he currently smoke 7-15 cigarettes; however, he is no longer buying them. Pt also states, he is not ready to return to the program at this time. Pt provided with his current benefit status and contact information to schedule an appointment when he is ready. Will update the tobacco cessation smart form and follow up with his progress in 3 months.

## 2018-08-06 ENCOUNTER — OUTPATIENT CASE MANAGEMENT (OUTPATIENT)
Dept: ADMINISTRATIVE | Facility: OTHER | Age: 48
End: 2018-08-06

## 2018-08-06 NOTE — PROGRESS NOTES
Please note the following patients information has been forwarded to Medicaid for Case Management.    Please contact Outpatient Complex Care Management at ext [phone] with any questions.    Thank you,    Mady Spivey, [facility] [place]  Out[facility] Care Mgmt  [phone]

## 2018-08-23 ENCOUNTER — TELEPHONE (OUTPATIENT)
Dept: SMOKING CESSATION | Facility: CLINIC | Age: 48
End: 2018-08-23

## 2018-08-23 NOTE — TELEPHONE ENCOUNTER
"Attempted to contact patient to follow up tobacco cessation. I was not able to leave a message. "This number not accepting calls at this time."    "

## 2018-08-26 ENCOUNTER — PATIENT MESSAGE (OUTPATIENT)
Dept: INTERNAL MEDICINE | Facility: CLINIC | Age: 48
End: 2018-08-26

## 2018-08-27 ENCOUNTER — TELEPHONE (OUTPATIENT)
Dept: INTERNAL MEDICINE | Facility: CLINIC | Age: 48
End: 2018-08-27

## 2018-08-27 NOTE — TELEPHONE ENCOUNTER
Patient messaged about have pain.  Left message on voicemail for patient to called back.  Appointment for today at 140pm

## 2018-09-05 ENCOUNTER — OFFICE VISIT (OUTPATIENT)
Dept: INTERNAL MEDICINE | Facility: CLINIC | Age: 48
End: 2018-09-05
Payer: MEDICAID

## 2018-09-05 VITALS
OXYGEN SATURATION: 96 % | HEIGHT: 64 IN | BODY MASS INDEX: 26.61 KG/M2 | TEMPERATURE: 98 F | RESPIRATION RATE: 20 BRPM | SYSTOLIC BLOOD PRESSURE: 102 MMHG | DIASTOLIC BLOOD PRESSURE: 84 MMHG | HEART RATE: 60 BPM | WEIGHT: 155.88 LBS

## 2018-09-05 DIAGNOSIS — R35.1 NOCTURIA: ICD-10-CM

## 2018-09-05 DIAGNOSIS — G89.29 OTHER CHRONIC PAIN: Primary | ICD-10-CM

## 2018-09-05 DIAGNOSIS — G47.9 TROUBLE IN SLEEPING: ICD-10-CM

## 2018-09-05 DIAGNOSIS — G35 MS (MULTIPLE SCLEROSIS): ICD-10-CM

## 2018-09-05 DIAGNOSIS — F32.A DEPRESSION, UNSPECIFIED DEPRESSION TYPE: ICD-10-CM

## 2018-09-05 LAB
AMPHET+METHAMPHET UR QL: NEGATIVE
BARBITURATES UR QL SCN>200 NG/ML: NEGATIVE
BENZODIAZ UR QL SCN>200 NG/ML: NEGATIVE
BZE UR QL SCN: NEGATIVE
CANNABINOIDS UR QL SCN: NEGATIVE
CREAT UR-MCNC: 10 MG/DL
ETHANOL UR-MCNC: <10 MG/DL
METHADONE UR QL SCN>300 NG/ML: NEGATIVE
OPIATES UR QL SCN: NEGATIVE
PCP UR QL SCN>25 NG/ML: NEGATIVE
TOXICOLOGY INFORMATION: ABNORMAL

## 2018-09-05 PROCEDURE — 99999 PR PBB SHADOW E&M-EST. PATIENT-LVL IV: CPT | Mod: PBBFAC,,, | Performed by: FAMILY MEDICINE

## 2018-09-05 PROCEDURE — 99213 OFFICE O/P EST LOW 20 MIN: CPT | Mod: S$PBB,,, | Performed by: FAMILY MEDICINE

## 2018-09-05 PROCEDURE — 80307 DRUG TEST PRSMV CHEM ANLYZR: CPT

## 2018-09-05 PROCEDURE — 99214 OFFICE O/P EST MOD 30 MIN: CPT | Mod: PBBFAC | Performed by: FAMILY MEDICINE

## 2018-09-05 RX ORDER — CALCIUM CARBONATE/VITAMIN D3 500MG-5MCG
TABLET ORAL
Refills: 0 | Status: CANCELLED | COMMUNITY
Start: 2018-09-05

## 2018-09-05 RX ORDER — AMITRIPTYLINE HYDROCHLORIDE 25 MG/1
25 TABLET, FILM COATED ORAL NIGHTLY PRN
Qty: 60 TABLET | Refills: 0 | Status: SHIPPED | OUTPATIENT
Start: 2018-09-05 | End: 2018-09-17 | Stop reason: SDUPTHER

## 2018-09-05 NOTE — PATIENT INSTRUCTIONS
Dr. Simon Instructions:      Starting new medicine today  Amitriptyline  This is an anti-depressant which we use for sleep and pain control as well.  I feel it is a good effort to do a trial period with.  Starting at 25 mg in the evening.  After a week if you are doing well we can increase to 50 mg.  We may titrate further but I prefer to go low and slow with med changes.    I want to see you soon after your neurology appt in [place].  So sometime in 2-4 weeks.  My nurse will schedule.    Please take your Vit d    We will do blood work at next visit.

## 2018-09-05 NOTE — PROGRESS NOTES
Subjective:       Patient ID: Logan Haywood is a 47 y.o. male.    Chief Complaint: Pain (MS, insomnia, fatigue)    HPI     48 yo M    PMH:    MS  Chronic Pain  Trouble sleeping  Depression  Memory problems.  Drug abuse - cocaine.    Seems to be having memory problems.     We have been trying to address his depression with Lexapro.  He has not been taking  Citing he was not aware he need to be on it or that he ran out of refills.   On Chart review was refilled on 7/12/18      Patient is having ongoing pain issues and have poor sleep.  Reports he is having a hard time falling asleep and staying asleep.    Patient does feel depressed.  Feels he maybe eating to much.    For his pain we have tried Gabapentin. Did not work  We prescribed Lyrica at last visit - reported his insurance did not cover it.  At last visit tested + for cocaine - discussed I will not prescribe controlled substance.    No thoughts of hurting himself.  Not taking any anti-depressant medication.     Review of Systems   Constitutional: Negative for chills and fever.   HENT: Negative for sore throat and trouble swallowing.    Respiratory: Negative for shortness of breath and wheezing.    Cardiovascular: Negative for chest pain and palpitations.   Gastrointestinal: Negative for constipation.   Genitourinary: Negative for difficulty urinating and dysuria.       Objective:      Physical Exam   Constitutional: He is oriented to person, place, and time. He appears well-developed and well-nourished. No distress.   HENT:   Head: Normocephalic and atraumatic.   Right Ear: Hearing, tympanic membrane, external ear and ear canal normal.   Left Ear: Hearing, tympanic membrane, external ear and ear canal normal.   Nose: Nose normal. Right sinus exhibits no maxillary sinus tenderness and no frontal sinus tenderness. Left sinus exhibits no maxillary sinus tenderness and no frontal sinus tenderness.   Mouth/Throat: Uvula is midline, oropharynx is clear and  moist and mucous membranes are normal.   Eyes: Conjunctivae are normal. Right eye exhibits no discharge. Left eye exhibits no discharge.   Neck: Trachea normal, normal range of motion and full passive range of motion without pain.   Cardiovascular: Normal rate, regular rhythm, normal heart sounds and intact distal pulses.   Pulmonary/Chest: Effort normal and breath sounds normal. No respiratory distress. He has no decreased breath sounds. He has no wheezes.   Abdominal: Soft. Normal appearance and bowel sounds are normal. He exhibits no distension and no mass. There is no tenderness. There is no guarding. No hernia.   Musculoskeletal: Normal range of motion. He exhibits no edema or deformity.   Lymphadenopathy:     He has no cervical adenopathy.   Neurological: He is alert and oriented to person, place, and time.   Skin: Skin is warm, dry and intact. No rash noted. No erythema. No pallor.   Psychiatric: He has a normal mood and affect. His speech is normal and behavior is normal. Thought content normal.       Assessment:       1. Other chronic pain    2. Depression, unspecified depression type    3. Trouble in sleeping    4. MS (multiple sclerosis)    5. Nocturia        Plan:       Chronic Pain  Trouble sleeping  Depressed mood    We have tried lexapro in the past.  I don't know exactly why he stopped taking it. He says he does not remember.  We discussed this at length at our last visit and still he reports no recollection of this. I suspect he may have underlying memory issues that could account for this. Upcoming neurology appt.     Denies current drug use.  Will check drug screen today.   Patient knows I will not write controlled substances.   (as we have had some dishonesty issues and a failed drug test - + for cocaine ).     Trial of Amitriptyline in order to address poor mood, chronic pain, and trouble breathing.   Elavil 25 mg to be prescribed.  Advising take 1 for a week then can take 2.  Plan on titrating  upwards as tolerated.  Plan on follow up in 2-4 weeks to assess for efficacy and tolerability. If doing well will increase dose.     Patient does have upcoming Neurology appt.      Case management consult  Pamphlet given with telephone number.    Patient has a complicated situation. In addition to his unfortunate diagnosis he is having financial issues - trying to get a  to help him with disability. He is having transportation issues as well and reports he will likely be losing his vehicle. Thus I offered case management to see if social work help could be beneficial. I told him they would call him - he has concerns he won't have phone contact. I gave him the telephone number to contact to see if they could help.      MS  Patient to f/u with his neurologist specialist later this month.  Reports he has not been taking his Vit D as prescribed.  Encouraged him to start taking again.          No Follow-up on file.

## 2018-09-13 ENCOUNTER — OFFICE VISIT (OUTPATIENT)
Dept: NEUROLOGY | Facility: CLINIC | Age: 48
End: 2018-09-13
Payer: MEDICAID

## 2018-09-13 ENCOUNTER — LAB VISIT (OUTPATIENT)
Dept: LAB | Facility: HOSPITAL | Age: 48
End: 2018-09-13
Attending: PSYCHIATRY & NEUROLOGY
Payer: MEDICAID

## 2018-09-13 VITALS — HEIGHT: 64 IN | WEIGHT: 155.75 LBS | BODY MASS INDEX: 26.59 KG/M2

## 2018-09-13 DIAGNOSIS — Z91.199 PERSONAL HISTORY OF NONCOMPLIANCE WITH MEDICAL TREATMENT: ICD-10-CM

## 2018-09-13 DIAGNOSIS — G35 MS (MULTIPLE SCLEROSIS): ICD-10-CM

## 2018-09-13 DIAGNOSIS — F32.A DEPRESSION, UNSPECIFIED DEPRESSION TYPE: ICD-10-CM

## 2018-09-13 DIAGNOSIS — Z51.81 THERAPEUTIC DRUG MONITORING: ICD-10-CM

## 2018-09-13 DIAGNOSIS — R41.9 COGNITIVE COMPLAINTS: ICD-10-CM

## 2018-09-13 DIAGNOSIS — G35 MS (MULTIPLE SCLEROSIS): Primary | ICD-10-CM

## 2018-09-13 DIAGNOSIS — R20.2 NUMBNESS AND TINGLING: ICD-10-CM

## 2018-09-13 DIAGNOSIS — R53.83 FATIGUE, UNSPECIFIED TYPE: ICD-10-CM

## 2018-09-13 DIAGNOSIS — R20.0 NUMBNESS AND TINGLING: ICD-10-CM

## 2018-09-13 LAB
ALBUMIN SERPL BCP-MCNC: 4.3 G/DL
ALP SERPL-CCNC: 85 U/L
ALT SERPL W/O P-5'-P-CCNC: 24 U/L
ANION GAP SERPL CALC-SCNC: 9 MMOL/L
AST SERPL-CCNC: 16 U/L
BASOPHILS # BLD AUTO: 0.03 K/UL
BASOPHILS NFR BLD: 0.5 %
BILIRUB SERPL-MCNC: 0.8 MG/DL
BUN SERPL-MCNC: 7 MG/DL
CALCIUM SERPL-MCNC: 10 MG/DL
CHLORIDE SERPL-SCNC: 104 MMOL/L
CO2 SERPL-SCNC: 28 MMOL/L
CREAT SERPL-MCNC: 0.8 MG/DL
DIFFERENTIAL METHOD: ABNORMAL
EOSINOPHIL # BLD AUTO: 0.2 K/UL
EOSINOPHIL NFR BLD: 2.6 %
ERYTHROCYTE [DISTWIDTH] IN BLOOD BY AUTOMATED COUNT: 12.1 %
EST. GFR  (AFRICAN AMERICAN): >60 ML/MIN/1.73 M^2
EST. GFR  (NON AFRICAN AMERICAN): >60 ML/MIN/1.73 M^2
GLUCOSE SERPL-MCNC: 85 MG/DL
HCT VFR BLD AUTO: 51.5 %
HGB BLD-MCNC: 17.3 G/DL
IMM GRANULOCYTES # BLD AUTO: 0.05 K/UL
IMM GRANULOCYTES NFR BLD AUTO: 0.9 %
LYMPHOCYTES # BLD AUTO: 0.4 K/UL
LYMPHOCYTES NFR BLD: 7.7 %
MCH RBC QN AUTO: 29.4 PG
MCHC RBC AUTO-ENTMCNC: 33.6 G/DL
MCV RBC AUTO: 88 FL
MONOCYTES # BLD AUTO: 0.5 K/UL
MONOCYTES NFR BLD: 8.4 %
NEUTROPHILS # BLD AUTO: 4.6 K/UL
NEUTROPHILS NFR BLD: 79.9 %
NRBC BLD-RTO: 0 /100 WBC
PLATELET # BLD AUTO: 197 K/UL
PMV BLD AUTO: 9.7 FL
POTASSIUM SERPL-SCNC: 4 MMOL/L
PROT SERPL-MCNC: 7.8 G/DL
RBC # BLD AUTO: 5.88 M/UL
SODIUM SERPL-SCNC: 141 MMOL/L
WBC # BLD AUTO: 5.72 K/UL

## 2018-09-13 PROCEDURE — 85025 COMPLETE CBC W/AUTO DIFF WBC: CPT

## 2018-09-13 PROCEDURE — 80053 COMPREHEN METABOLIC PANEL: CPT

## 2018-09-13 PROCEDURE — 99213 OFFICE O/P EST LOW 20 MIN: CPT | Mod: PBBFAC | Performed by: PSYCHIATRY & NEUROLOGY

## 2018-09-13 PROCEDURE — 99215 OFFICE O/P EST HI 40 MIN: CPT | Mod: S$PBB,,, | Performed by: PSYCHIATRY & NEUROLOGY

## 2018-09-13 PROCEDURE — 99999 PR PBB SHADOW E&M-EST. PATIENT-LVL III: CPT | Mod: PBBFAC,,, | Performed by: PSYCHIATRY & NEUROLOGY

## 2018-09-13 PROCEDURE — 36415 COLL VENOUS BLD VENIPUNCTURE: CPT

## 2018-09-13 RX ORDER — GABAPENTIN 600 MG/1
600 TABLET ORAL 3 TIMES DAILY
Qty: 90 TABLET | Refills: 3 | Status: SHIPPED | OUTPATIENT
Start: 2018-09-13 | End: 2019-04-29 | Stop reason: ALTCHOICE

## 2018-09-13 NOTE — PROGRESS NOTES
Subjective:       Patient ID: Logan Haywood is a 47 y.o. male who presents today for a routine clinic visit for MS followup.      MS HPI- initial note 3/2018 below:  RH male with history of mood disorders and substance abuse, MS diagnosed in 2/2018. At least for past 4-5 years, he has had numbness and painful tingling in feet and restless leg movements at night. Has always had issues with fatigue. No prior weakness. He was admitted to hospital ([facility]) 2/2018 and diagnosed with MS when he presented for numbness and weakness to the left side of body along with left-sided pain radiating down left body; no Lhermitte's. MRI brain demonstrated multiple elliptical foci of T2 hyperintensity perpendicular to the lateral ventricles and some subcortical in both frontal lobes, consistent with demyelination. There was one enhancing lesion in the subcortical region of the lateral left temporal lobe. He also had several lesions in C-spine with one enhancing at C2. He was advised to stay for LP and IV steroids, but patient chose to leave AMA. He returned to ED 2/27 for rest of w/u and treatment. He was treated with IV steroids and started on paroxetine for depression as he reported SI and clonazepam for restless legs/sleep. Following discharge, he was seen by Dr. Baeza for follow-up, but he was referred to MS center for second opinion of care. At the time, he reported right hand numbness in fingertips, but no new significant symptoms. When he lays down on left side, he feels altered sensation like there's a pillow between his legs. He was started on Tecfidera 3/2018, but initially not adherent; reported that he ran out and didn't know that he should continue it.       TODAY/SUBJECTIVE:  · DMT: Started Tecfidera 3/2018, but out of medication after 2 week period. Resumed medication in 5/2018. Denies missed doses at present.  · Side effects from DMT? Flushing and infrequent diarrhea. Counseled on use of aspirin as pre-treatment  and importance of eating with high protein meal.  · Taking vitamin D3 as recommended? Yes, uses some of mother's medication. Dose: dose unknown.  · Today he reports that his whole body is numb now, which continued since 5/2018. Even though he complains of numbness, majority of appt was spent talking about his pain and had to keep re-directing him. He reports his left body hurts more than right still. I confronted his episodes of non-adherance and patient reported ignorance of past refusals of care or non-adherence with medication. He was initially referred to pain management at [facility] by me and refused. I have prescribed gabapentin in past, but he didn't want to continue as he preferred opiod medication that I would not prescribe. He then followed with PCP, but per documentation, he hasn't been entirely compliant with him either - has had positive UDS with cocaine, prescribed lexapro for mood but non-compliant (states he stopped once he ran out of refills), taking mother's pain medication. PCP prescribed Lyrica but not covered by insurance. Patient has many reasons why he did not comply, but ultimately agreed to retry gabapentin after explaining to him several times why insurance may not cover fibromyalgia (not FDA approved diagnosis).  · He has applied for social security and brought disability forms in today. When discussing his disability, he reports mostly being limited by by fatigue, pain, and anhedonia. States that everything makes him tired, including making a sandwich or talking. Extremely tired if walking outdoors. I discussed the likelihood of depression as depression can also encompass many of his symptoms, he keeps coming back to being depression about lack of financial resources. He was supposed to follow with our SW to help him with MS resources but he didn't not follow through with previous appts and has refused to call to set up any more. Continues to site memory as a reason, which can also be sign  "of depression.  · He started taking the amitriptyline recently and increased it to 50mg nightly.       SOCIAL HISTORY  Social History     Tobacco Use    Smoking status: Current Every Day Smoker     Packs/day: 1.00     Years: 32.00     Pack years: 32.00     Types: Cigarettes    Smokeless tobacco: Never Used   Substance Use Topics    Alcohol use: Yes     Comment: seldom    Drug use: No     Living arrangements - the patient currently stays with brother, but states that this is temporary. Can stay with mother if needed.  Employment: Unemployed. Has applied for social security in meantime.    Review of systems:   · Fatigue: Yes - "severe"   · Sleep Disturbance: Yes - no prior sleep study   · Bladder Dysfunction: No retention. Polyuria. Urge incontinence.  · Bowel Dysfunction: Yes - near accidents. 3 months prior to ED presentation, with bowel incontinence   · Spasticity: No   · Visual Symptoms: Yes - wears glasses, feels eyesight is worsening. No ophthalmologist   · Cognitive: Yes - sometimes with difficulty getting words out   · Mood Disorder: Yes - worry a lot, considered suicide, decreased appetite, anxiety, loss of energy. States that suicidal thoughts are "passing thoughts" and would never act on it.   · Gait Disturbance: Yes - unsteady due to left leg weakness, bump into walls   · Falls: No  · Hand Dysfunction: Yes - left hand weakness, cannot  anything  · Pain: Yes - neck pain, back pain (near left shoulder blade), aching joints, plantar fasciitis in bilateral feet   · Sexual Dysfunction: Not Assessed   · Skin Breakdown: Yes - hair loss   · Tremors: No   · Dysphagia: Yes - difficulty getting food down.   · Dysarthria: No but occasionally will trip on words  · Heat sensitivity: Yes - increased fatigue   · Any un-met adaptive needs? No, refuses further pt/ot  · Copay Assist? Yes        Objective:        1. 25 foot timed walk: 5.78 sec w/o assistive device (previously 8.26 sec w/o assistive device)  No " flowsheet data found.  2. SDMT:  3. 9 Hole Peg Test:  No flowsheet data found.    Neurologic Exam      MENTAL STATUS: grossly intact. Normal language, attention, and memory.   CRANIAL NERVE EXAM: Extraocular muscles are intact.  No facial asymmetry. tongue midline. Shoulder shrug normal b/l. There is no dysarthria.  MOTOR EXAM: Normal bulk and tone throughout UE and LE bilaterally. Strength is 5/5 in all groups in the lower extremities and upper extremities except 5- left HF (pain limited).   SENSORY EXAM: decreased right arm compared to left today (switched since last appt) numb  COORDINATION: Normal finger-to-nose exam.   GAIT: Narrow based. A little unstable.      Imaging:       Results for orders placed during the hospital encounter of 02/26/18   MRI Cervical Spine W WO Cont    Impression      There are four separate foci of abnormal T2 signal within the cervical cord as discussed above.  The one at the level of C2 demonstrates abnormal enhancement.  These findings are concerning for demyelinating disease such as multiple sclerosis.    Abnormal enhancement may be seen in active demyelination.      Electronically signed by: GAMALIEL LAMBERT M.D.  Date:     02/26/18  Time:    17:05      No new imaging      Labs:     Lab Results   Component Value Date    RIWAEOFN10EA 34 05/24/2018     No results found for: JCVINDEX, JCVANTIBODY  Lab Results   Component Value Date    XM5JKXID 73.1 05/24/2018    ABSOLUTECD3 1274 05/24/2018    DS1SMYTT 22.1 05/24/2018    ABSOLUTECD8 385 05/24/2018    QA2UCKLG 49.3 05/24/2018    ABSOLUTECD4 860 05/24/2018    LABCD48 2.24 05/24/2018     Lab Results   Component Value Date    WBC 9.76 05/24/2018    RBC 5.86 05/24/2018    HGB 17.7 05/24/2018    HCT 52.7 05/24/2018    MCV 90 05/24/2018    MCH 30.2 05/24/2018    MCHC 33.6 05/24/2018    RDW 12.5 05/24/2018     05/24/2018    MPV 9.9 05/24/2018    GRAN 7.4 05/24/2018    GRAN 75.9 (H) 05/24/2018    LYMPH 1.6 05/24/2018    LYMPH 15.9 (L)  05/24/2018    MONO 0.6 05/24/2018    MONO 6.5 05/24/2018    EOS 0.1 05/24/2018    BASO 0.04 05/24/2018    EOSINOPHIL 0.6 05/24/2018    BASOPHIL 0.4 05/24/2018       Chemistry        Component Value Date/Time     05/24/2018 1636    K 4.2 05/24/2018 1636     05/24/2018 1636    CO2 27 05/24/2018 1636    BUN 9 05/24/2018 1636    CREATININE 1.0 05/24/2018 1636    GLU 97 05/24/2018 1636        Component Value Date/Time    CALCIUM 10.5 05/24/2018 1636    ALKPHOS 92 05/24/2018 1636    AST 16 05/24/2018 1636    ALT 21 05/24/2018 1636    BILITOT 0.9 05/24/2018 1636    ESTGFRAFRICA >60.0 05/24/2018 1636    EGFRNONAA >60.0 05/24/2018 1636              Diagnosis/Assessment/Plan:   1. Multiple sclerosis  · Assessment: Diagnosed 2/2018 based on clinical history, MRI, and CSF studies. Started on Tecfidera, which he seems to tolerate, but out of medication for one month. He's currently with new complaint of new whole body numbness. Unclear if patient with new relapse as there has been some question to his reliability in the past, but he was non-compliant initially so he could have a new lesion. We discussed need for compliance with medication. Recommended taking 81mg aspirin 15 min prior to Tecfidera to help with flushing.  · Imaging: MRI brain and c-spine ordered, 6 months post-start DMT and due to new symptoms.  · Labs: DMT safety labs ordered  · DMT: continue Tecfidera, encouraged phoning in if any disruptions in medication  · The the patient was counseled about the importance of vitamin D supplementation in multiple sclerosis, and the fact that recent data suggests that high circulating blood levels of vitamin D has an ameliorating effect on the disease course in multiple sclerosis in general.     2. MS Symptomatic management:  · Fatigue/sleep: still may benefit from sleep study, but deferred for now  · Bladder dysfunction: bowel/bladder incontinence. Urology referral placed but did not follow-up  · Visual symptoms:  referred to optometry by PCP but yet to be seen  · Cognitive: may need neuropsych testing in future but suspect mood and sleep disorder contributing more than MS at this time  · Mood disorder: unclear if primary or secondary to substance use. UDS shows occasional substance use still. He has been offered counseling with our SW's and I have given him resources, but patient has yet to follow through. Will continue to monitor and encouraged him to continue amitriptyline. Will re-evaluate next visit.  · Pain: willing to retry gabapentin for neuropathic pain. Will start with 600mg tid and uptitrate to 900mg tid if tolerated and it helps.  · Gait disturbance: completed some outpatient PT/OT but stopped and does not want to continue at this time  · Other: will have SW help with disability forms    RTC in 3 months      Over 50% of this 60 minute visit was spent in direct face to face counseling of the patient about MS, DMT management, and MS symptom management.         There are no diagnoses linked to this encounter.

## 2018-09-17 ENCOUNTER — OFFICE VISIT (OUTPATIENT)
Dept: INTERNAL MEDICINE | Facility: CLINIC | Age: 48
End: 2018-09-17
Payer: MEDICAID

## 2018-09-17 VITALS
OXYGEN SATURATION: 98 % | WEIGHT: 158.06 LBS | SYSTOLIC BLOOD PRESSURE: 114 MMHG | RESPIRATION RATE: 20 BRPM | TEMPERATURE: 98 F | HEIGHT: 64 IN | HEART RATE: 82 BPM | DIASTOLIC BLOOD PRESSURE: 74 MMHG | BODY MASS INDEX: 26.98 KG/M2

## 2018-09-17 DIAGNOSIS — R07.9 CHEST PAIN, UNSPECIFIED TYPE: Primary | ICD-10-CM

## 2018-09-17 DIAGNOSIS — Z02.9 ADMINISTRATIVE ENCOUNTER: ICD-10-CM

## 2018-09-17 DIAGNOSIS — G89.29 OTHER CHRONIC PAIN: ICD-10-CM

## 2018-09-17 PROCEDURE — 99999 PR PBB SHADOW E&M-EST. PATIENT-LVL IV: CPT | Mod: PBBFAC,,, | Performed by: FAMILY MEDICINE

## 2018-09-17 PROCEDURE — 99214 OFFICE O/P EST MOD 30 MIN: CPT | Mod: S$PBB,,, | Performed by: FAMILY MEDICINE

## 2018-09-17 PROCEDURE — 99214 OFFICE O/P EST MOD 30 MIN: CPT | Mod: PBBFAC | Performed by: FAMILY MEDICINE

## 2018-09-17 RX ORDER — AMITRIPTYLINE HYDROCHLORIDE 25 MG/1
75 TABLET, FILM COATED ORAL NIGHTLY PRN
Qty: 180 TABLET | Refills: 0 | Status: SHIPPED | OUTPATIENT
Start: 2018-09-17 | End: 2018-10-15

## 2018-09-17 NOTE — PROGRESS NOTES
Subjective:       Patient ID: Logan Haywood is a 48 y.o. male.    Chief Complaint: Follow-up    HPI     49 yo M    Has had some relief with pain with increased gabapentin.  Taking gabapentin 3x day - at 600 mg.  At night he takes 2mg     Patient reports needs MRI to be scheduled.   Neurology highlighted - MRI brain and cervical spine.     Patient needs paperwork for his .     At last visit   We tried Elavil  He reports it has not helped much.  He is taking 50 mg now.    Reports is having some chest pain  Occurs as a little pain  Feels Like gas  Does come on with exertion.    Review of Systems   Constitutional: Negative for chills and fever.   HENT: Negative for trouble swallowing and voice change.    Respiratory: Negative for shortness of breath and wheezing.    Cardiovascular: Positive for chest pain. Negative for palpitations.   Gastrointestinal: Negative for abdominal distention and abdominal pain.   Musculoskeletal: Positive for gait problem and myalgias.   Skin: Negative for color change and pallor.   Neurological: Positive for weakness. Negative for dizziness.   Psychiatric/Behavioral: Negative for dysphoric mood.        Fatigue       Objective:       Vitals:    09/17/18 1012   BP: 114/74   Pulse: 82   Resp: 20   Temp: 98.3 °F (36.8 °C)       Physical Exam   Constitutional: He is oriented to person, place, and time. He appears well-developed and well-nourished. No distress.   HENT:   Head: Normocephalic and atraumatic.   Right Ear: Hearing, tympanic membrane, external ear and ear canal normal.   Left Ear: Hearing, tympanic membrane, external ear and ear canal normal.   Nose: Nose normal. Right sinus exhibits no maxillary sinus tenderness and no frontal sinus tenderness. Left sinus exhibits no maxillary sinus tenderness and no frontal sinus tenderness.   Mouth/Throat: Uvula is midline, oropharynx is clear and moist and mucous membranes are normal.   Eyes: Conjunctivae are normal. Right eye exhibits  no discharge. Left eye exhibits no discharge.   Neck: Trachea normal, normal range of motion and full passive range of motion without pain.   Cardiovascular: Normal rate, regular rhythm, normal heart sounds and intact distal pulses.   Pulmonary/Chest: Effort normal and breath sounds normal. No respiratory distress. He has no decreased breath sounds. He has no wheezes.   Abdominal: Soft. Normal appearance and bowel sounds are normal. He exhibits no distension and no mass. There is no tenderness. There is no guarding. No hernia.   Musculoskeletal: Normal range of motion. He exhibits no edema or deformity.   Lymphadenopathy:     He has no cervical adenopathy.   Neurological: He is alert and oriented to person, place, and time.   Skin: Skin is warm, dry and intact. No rash noted. No erythema. No pallor.   Psychiatric: He has a normal mood and affect. His speech is normal and behavior is normal. Thought content normal.       Assessment:       1. Chest pain, unspecified type    2. Administrative encounter    3. Other chronic pain        Plan:   Chest pain    Has been present for a few weeks. Not present today.  Feels it maybe with exertion  Will arrange for Cardiology evaluation    Administrative encounter    Form for  indicating diagnosis  Will fill out paper work.  Completed in office.      Chronic pain  Continue gabapentin  Continue elavil  Increasing dose  Taking 50 mg  Ok to take 75 for a few weeks  Titrate to 100 mg.          F/u with me in 1-2 months to assess if high elavil dosing helps      No Follow-up on file.

## 2018-09-19 ENCOUNTER — TELEPHONE (OUTPATIENT)
Dept: NEUROLOGY | Facility: CLINIC | Age: 48
End: 2018-09-19

## 2018-09-19 NOTE — TELEPHONE ENCOUNTER
----- Message from Ximena Hurd sent at 9/19/2018 12:25 PM CDT -----  Contact: pt @ [phone]  Calling to get the status of the paper work that he left with the office to be completed by Dr. Mills. Please call.

## 2018-09-21 ENCOUNTER — TELEPHONE (OUTPATIENT)
Dept: INTERNAL MEDICINE | Facility: CLINIC | Age: 48
End: 2018-09-21

## 2018-09-24 NOTE — TELEPHONE ENCOUNTER
Mailed Residual Functional Capacity form for pt's disability claim to pt's home address, per his voicemail request, today.  Copy kept for chart.

## 2018-09-27 ENCOUNTER — HOSPITAL ENCOUNTER (OUTPATIENT)
Dept: RADIOLOGY | Facility: HOSPITAL | Age: 48
Discharge: HOME | End: 2018-09-27
Attending: PSYCHIATRY & NEUROLOGY
Payer: MEDICAID

## 2018-09-27 DIAGNOSIS — G35 MS (MULTIPLE SCLEROSIS): ICD-10-CM

## 2018-09-27 DIAGNOSIS — Z51.81 THERAPEUTIC DRUG MONITORING: ICD-10-CM

## 2018-09-27 PROCEDURE — 70553 MRI BRAIN STEM W/O & W/DYE: CPT | Mod: TC

## 2018-09-27 PROCEDURE — A9585 GADOBUTROL INJECTION: HCPCS | Performed by: PSYCHIATRY & NEUROLOGY

## 2018-09-27 PROCEDURE — 72156 MRI NECK SPINE W/O & W/DYE: CPT | Mod: TC

## 2018-09-27 PROCEDURE — 25500020 PHARM REV CODE 255: Performed by: PSYCHIATRY & NEUROLOGY

## 2018-09-27 RX ORDER — GADOBUTROL 604.72 MG/ML
7 INJECTION INTRAVENOUS
Status: COMPLETED | OUTPATIENT
Start: 2018-09-27 | End: 2018-09-27

## 2018-09-27 RX ADMIN — GADOBUTROL 7 ML: 604.72 INJECTION INTRAVENOUS at 02:09

## 2018-10-02 ENCOUNTER — PATIENT MESSAGE (OUTPATIENT)
Dept: NEUROLOGY | Facility: CLINIC | Age: 48
End: 2018-10-02

## 2018-10-02 DIAGNOSIS — D72.810 LYMPHOPENIA: Primary | ICD-10-CM

## 2018-10-04 ENCOUNTER — OUTPATIENT CASE MANAGEMENT (OUTPATIENT)
Dept: ADMINISTRATIVE | Facility: OTHER | Age: 48
End: 2018-10-04

## 2018-10-04 NOTE — TELEPHONE ENCOUNTER
Attempted to call patient by home phone (left brief message) and cell; no answer x 2. Also called brother's phone number, but no answer.

## 2018-10-04 NOTE — PROGRESS NOTES
Please note the following patients information has been forwarded to Medicaid for Case Management.    Please contact Outpatient Complex Care Management at ext [phone] with any questions.    Thank you,    Mady Spivey, [facility]  Outpatient Care Mgmt.  [phone]

## 2018-10-08 ENCOUNTER — PATIENT OUTREACH (OUTPATIENT)
Dept: ADMINISTRATIVE | Facility: HOSPITAL | Age: 48
End: 2018-10-08

## 2018-10-10 ENCOUNTER — TELEPHONE (OUTPATIENT)
Dept: INTERNAL MEDICINE | Facility: CLINIC | Age: 48
End: 2018-10-10

## 2018-10-10 NOTE — TELEPHONE ENCOUNTER
No callback number left on message to contact office to see what records are needed, only fax number left. Attempted to call patient to verify Doctors name and facility in order to send information, but patient was unavailable.

## 2018-10-10 NOTE — TELEPHONE ENCOUNTER
----- Message from Shahana Goodwin sent at 10/10/2018 10:31 AM CDT -----  Contact: Olivia Salguero  Ms Baker needs patients records faxed to her at [phone], Ms Baker states that patient is coming in office today and needs them asap.. Thank you

## 2018-10-11 ENCOUNTER — TELEPHONE (OUTPATIENT)
Dept: INTERNAL MEDICINE | Facility: CLINIC | Age: 48
End: 2018-10-11

## 2018-10-11 NOTE — TELEPHONE ENCOUNTER
----- Message from Saba Urias sent at 10/11/2018  1:25 PM CDT -----  Contact: Pt  Pt called and stated he was returning a call to the nurse. He can be reached at [phone].    Thanks,  TF

## 2018-10-15 ENCOUNTER — OFFICE VISIT (OUTPATIENT)
Dept: INTERNAL MEDICINE | Facility: CLINIC | Age: 48
End: 2018-10-15
Payer: MEDICAID

## 2018-10-15 ENCOUNTER — LAB VISIT (OUTPATIENT)
Dept: LAB | Facility: HOSPITAL | Age: 48
End: 2018-10-15
Payer: MEDICAID

## 2018-10-15 VITALS
HEIGHT: 64 IN | TEMPERATURE: 97 F | WEIGHT: 159.63 LBS | BODY MASS INDEX: 27.25 KG/M2 | RESPIRATION RATE: 20 BRPM | OXYGEN SATURATION: 98 % | SYSTOLIC BLOOD PRESSURE: 104 MMHG | DIASTOLIC BLOOD PRESSURE: 76 MMHG | HEART RATE: 78 BPM

## 2018-10-15 DIAGNOSIS — G35 MS (MULTIPLE SCLEROSIS): ICD-10-CM

## 2018-10-15 DIAGNOSIS — D72.810 LYMPHOPENIA: ICD-10-CM

## 2018-10-15 DIAGNOSIS — Z02.9 ADMINISTRATIVE ENCOUNTER: Primary | ICD-10-CM

## 2018-10-15 DIAGNOSIS — G89.29 OTHER CHRONIC PAIN: ICD-10-CM

## 2018-10-15 DIAGNOSIS — G47.9 TROUBLE IN SLEEPING: ICD-10-CM

## 2018-10-15 LAB
BASOPHILS # BLD AUTO: 0.06 K/UL
BASOPHILS NFR BLD: 1 %
DIFFERENTIAL METHOD: ABNORMAL
EOSINOPHIL # BLD AUTO: 0.3 K/UL
EOSINOPHIL NFR BLD: 4.1 %
ERYTHROCYTE [DISTWIDTH] IN BLOOD BY AUTOMATED COUNT: 12.6 %
HCT VFR BLD AUTO: 49.7 %
HGB BLD-MCNC: 16.9 G/DL
IMM GRANULOCYTES # BLD AUTO: 0.13 K/UL
IMM GRANULOCYTES NFR BLD AUTO: 2.1 %
LYMPHOCYTES # BLD AUTO: 0.6 K/UL
LYMPHOCYTES NFR BLD: 10.4 %
MCH RBC QN AUTO: 30.3 PG
MCHC RBC AUTO-ENTMCNC: 34 G/DL
MCV RBC AUTO: 89 FL
MONOCYTES # BLD AUTO: 0.5 K/UL
MONOCYTES NFR BLD: 8.3 %
NEUTROPHILS # BLD AUTO: 4.6 K/UL
NEUTROPHILS NFR BLD: 74.1 %
NRBC BLD-RTO: 0 /100 WBC
PLATELET # BLD AUTO: 179 K/UL
PMV BLD AUTO: 10.6 FL
RBC # BLD AUTO: 5.58 M/UL
WBC # BLD AUTO: 6.15 K/UL

## 2018-10-15 PROCEDURE — 99999 PR PBB SHADOW E&M-EST. PATIENT-LVL III: CPT | Mod: PBBFAC,,, | Performed by: FAMILY MEDICINE

## 2018-10-15 PROCEDURE — 86359 T CELLS TOTAL COUNT: CPT

## 2018-10-15 PROCEDURE — 85025 COMPLETE CBC W/AUTO DIFF WBC: CPT

## 2018-10-15 PROCEDURE — 36415 COLL VENOUS BLD VENIPUNCTURE: CPT

## 2018-10-15 PROCEDURE — 86360 T CELL ABSOLUTE COUNT/RATIO: CPT

## 2018-10-15 PROCEDURE — 99213 OFFICE O/P EST LOW 20 MIN: CPT | Mod: PBBFAC | Performed by: FAMILY MEDICINE

## 2018-10-15 PROCEDURE — 99214 OFFICE O/P EST MOD 30 MIN: CPT | Mod: S$PBB,,, | Performed by: FAMILY MEDICINE

## 2018-10-15 RX ORDER — AMITRIPTYLINE HYDROCHLORIDE 100 MG/1
100 TABLET ORAL NIGHTLY
Qty: 90 TABLET | Refills: 0 | Status: SHIPPED | OUTPATIENT
Start: 2018-10-15 | End: 2019-01-28 | Stop reason: SDUPTHER

## 2018-10-15 RX ORDER — AMITRIPTYLINE HYDROCHLORIDE 100 MG/1
100 TABLET ORAL NIGHTLY
Qty: 90 TABLET | Refills: 0 | Status: SHIPPED | OUTPATIENT
Start: 2018-10-15 | End: 2018-10-15 | Stop reason: SDUPTHER

## 2018-10-15 NOTE — PROGRESS NOTES
"Subjective:       Patient ID: Logan Haywood is a 48 y.o. male.    Chief Complaint: Follow-up (4 wk)    HPI     48     MS  Chronic Pain  Trouble sleeping  Depression  Memory problems.  Drug abuse - cocaine.    F/u Visit to assess use of efficacy of Amitriptyline  Attempted use to address:  Poor mood, chronic pain, neuropathic pain  Discussed starting at 25 then increasing to 50 mg.   Has been taking up to 100 mg - higher than I advised initially    Finds has had some benefit with help him sleep.  Between the gabapentin and the elavil - gets better rest.    Case management applied for given complicated social situation  " Please note the following patients information has been forwarded to Medicaid for Case Management." per chart      Dr. Mills in [facility] is MS neurology  Has labs pending today to assess for tecfidera safety.  Patient states unlikely he will return there    Having a hard time getting to [facility].  Transportation is too difficult.    Review of Systems   Constitutional: Negative for chills and fever.   HENT: Negative for trouble swallowing and voice change.    Cardiovascular: Negative for chest pain.   Musculoskeletal: Positive for gait problem and myalgias.   Psychiatric/Behavioral: Positive for dysphoric mood and sleep disturbance.       Objective:       Vitals:    10/15/18 1007   BP: 104/76   Pulse: 78   Resp: 20   Temp: 97.2 °F (36.2 °C)       Physical Exam   Constitutional: He is oriented to person, place, and time. He appears well-developed and well-nourished. No distress.   HENT:   Head: Normocephalic and atraumatic.   Right Ear: Hearing, tympanic membrane, external ear and ear canal normal.   Left Ear: Hearing, tympanic membrane, external ear and ear canal normal.   Nose: Nose normal. Right sinus exhibits no maxillary sinus tenderness and no frontal sinus tenderness. Left sinus exhibits no maxillary sinus tenderness and no frontal sinus tenderness.   Mouth/Throat: Uvula is midline, oropharynx is " clear and moist and mucous membranes are normal.   Eyes: Conjunctivae are normal. Right eye exhibits no discharge. Left eye exhibits no discharge.   Neck: Trachea normal, normal range of motion and full passive range of motion without pain.   Cardiovascular: Normal rate, regular rhythm, normal heart sounds and intact distal pulses.   Pulmonary/Chest: Effort normal and breath sounds normal. No respiratory distress. He has no decreased breath sounds. He has no wheezes.   Abdominal: Soft. Normal appearance and bowel sounds are normal. He exhibits no distension and no mass. There is no tenderness. There is no guarding. No hernia.   Musculoskeletal: Normal range of motion. He exhibits no edema or deformity.   Lymphadenopathy:     He has no cervical adenopathy.   Neurological: He is alert and oriented to person, place, and time.   Skin: Skin is warm, dry and intact. No rash noted. No erythema. No pallor.   Psychiatric: He has a normal mood and affect. His speech is normal and behavior is normal. Thought content normal.       Assessment:       1. Administrative encounter    2. Other chronic pain    3. Trouble in sleeping    4. MS (multiple sclerosis)        Plan:   Administrative encounter  Chronic pain  Trouble in sleeping  MS (multiple sclerosis)        Difficult situation.   Patient continues to struggle with chronic pain, depression, financial burdens, restless legs.    Mood is still low as his is outlook and attitude.  Reports he did not call case management.    Taking someone else's Klonopin.  Denied request for prescribing that medication given history of drug abuse , dishonesty about such, and issues we have discussed in the past. I have made it clear several times I will not prescribe controlled substances.      Taking amitriptyline 25 mg  Reports taking up to 4/day  Which would be 100 mg.  Will refill at 100 mg tablet.  Told him to avoid taking more than 1.5 tablets.    Gabapentin 1.5 at night  900 mg for  restless leg pain    Paperwork done for . He brought up a several page document at end of our encounter. Filled out - Cited his chronic pain and MS induced fatigue in addition to his depression make it unlikely he will be able to do work as he did or function as he was able to.  I told him I do not do social security / disability and he will need to see a doctor who does do that.  Social security office needed.      No further chest pain.  States went to cardiologist but got the office wrong.  Tells me he will not go back .    Emphasized need to return to neuro as scheduled - I will not be able to provide speciality services as he will need.       No Follow-up on file.

## 2018-10-15 NOTE — PATIENT INSTRUCTIONS
Amitriptyline prescribed at 100 mg today  Max I want you taking is 1.5 tablets a day.    With the gabapentin  You can take 900 mg at night  That is a tablet and half.

## 2018-10-16 ENCOUNTER — PATIENT MESSAGE (OUTPATIENT)
Dept: NEUROLOGY | Facility: CLINIC | Age: 48
End: 2018-10-16

## 2018-10-16 LAB
ABSOLUTE CD3: 457 CELLS/UL (ref 700–2100)
ABSOLUTE CD8: 94 CELLS/UL (ref 200–900)
CD3%: 69.2 % (ref 55–83)
CD3+CD4+ CELLS # BLD: 361 CELLS/UL (ref 300–1400)
CD3+CD4+ CELLS NFR BLD: 54.6 % (ref 28–57)
CD4/CD8 RATIO: 3.84 (ref 0.9–3.6)
CD8 % SUPPRESSOR T CELL: 14.2 % (ref 10–39)

## 2018-10-17 DIAGNOSIS — G35 MULTIPLE SCLEROSIS: Primary | ICD-10-CM

## 2018-10-26 ENCOUNTER — TELEPHONE (OUTPATIENT)
Dept: NEUROLOGY | Facility: CLINIC | Age: 48
End: 2018-10-26

## 2018-10-26 RX ORDER — AMANTADINE HYDROCHLORIDE 100 MG/1
100 CAPSULE, GELATIN COATED ORAL 2 TIMES DAILY
Qty: 60 CAPSULE | Refills: 2 | Status: SHIPPED | OUTPATIENT
Start: 2018-10-26 | End: 2019-01-08

## 2018-10-26 NOTE — TELEPHONE ENCOUNTER
Discussed need to repeat CBC next month 11/2018. Then will trial once daily Tecfidera if ALC >600.  Also with complaints of fatigue.  A PARQ discussion was held for amantadine in the treatment of fatigue and all questions/concerns were addressed to patient's satisfaction.  For RLS, would prefer to hold on starting 2 new drugs at this time. In the meantime, advised him to increase /600/900.

## 2018-10-26 NOTE — TELEPHONE ENCOUNTER
----- Message from Evan Munoz sent at 10/26/2018  3:44 PM CDT -----  Contact: Patient @ [phone]  Patient is returning a missed call from abdon Barr return call

## 2018-10-31 ENCOUNTER — LAB VISIT (OUTPATIENT)
Dept: LAB | Facility: HOSPITAL | Age: 48
End: 2018-10-31
Attending: CLINICAL NURSE SPECIALIST
Payer: MEDICAID

## 2018-10-31 DIAGNOSIS — G35 MULTIPLE SCLEROSIS: ICD-10-CM

## 2018-10-31 LAB
BASOPHILS # BLD AUTO: 0.05 K/UL
BASOPHILS NFR BLD: 0.6 %
DIFFERENTIAL METHOD: ABNORMAL
EOSINOPHIL # BLD AUTO: 0.2 K/UL
EOSINOPHIL NFR BLD: 2.8 %
ERYTHROCYTE [DISTWIDTH] IN BLOOD BY AUTOMATED COUNT: 12.2 %
HCT VFR BLD AUTO: 45.9 %
HGB BLD-MCNC: 15.5 G/DL
IMM GRANULOCYTES # BLD AUTO: 0.11 K/UL
IMM GRANULOCYTES NFR BLD AUTO: 1.4 %
LYMPHOCYTES # BLD AUTO: 0.8 K/UL
LYMPHOCYTES NFR BLD: 10.4 %
MCH RBC QN AUTO: 29.9 PG
MCHC RBC AUTO-ENTMCNC: 33.8 G/DL
MCV RBC AUTO: 89 FL
MONOCYTES # BLD AUTO: 0.6 K/UL
MONOCYTES NFR BLD: 7.3 %
NEUTROPHILS # BLD AUTO: 6.3 K/UL
NEUTROPHILS NFR BLD: 77.5 %
NRBC BLD-RTO: 0 /100 WBC
PLATELET # BLD AUTO: 201 K/UL
PMV BLD AUTO: 10.5 FL
RBC # BLD AUTO: 5.18 M/UL
WBC # BLD AUTO: 8.09 K/UL

## 2018-10-31 PROCEDURE — 36415 COLL VENOUS BLD VENIPUNCTURE: CPT

## 2018-10-31 PROCEDURE — 86360 T CELL ABSOLUTE COUNT/RATIO: CPT

## 2018-10-31 PROCEDURE — 85025 COMPLETE CBC W/AUTO DIFF WBC: CPT

## 2018-10-31 PROCEDURE — 86359 T CELLS TOTAL COUNT: CPT

## 2018-11-02 ENCOUNTER — TELEPHONE (OUTPATIENT)
Dept: NEUROLOGY | Facility: CLINIC | Age: 48
End: 2018-11-02

## 2018-11-02 DIAGNOSIS — G35 MULTIPLE SCLEROSIS: Primary | ICD-10-CM

## 2018-11-02 LAB
ABSOLUTE CD3: 550 CELLS/UL (ref 700–2100)
ABSOLUTE CD8: 116 CELLS/UL (ref 200–900)
CD3%: 69.3 % (ref 55–83)
CD3+CD4+ CELLS # BLD: 429 CELLS/UL (ref 300–1400)
CD3+CD4+ CELLS NFR BLD: 54.1 % (ref 28–57)
CD4/CD8 RATIO: 3.71 (ref 0.9–3.6)
CD8 % SUPPRESSOR T CELL: 14.6 % (ref 10–39)

## 2018-11-07 ENCOUNTER — TELEPHONE (OUTPATIENT)
Dept: SMOKING CESSATION | Facility: CLINIC | Age: 48
End: 2018-11-07

## 2018-11-07 NOTE — TELEPHONE ENCOUNTER
As per Dr. Mills, please instruct patient to restart Tecfidera at once per day dosing. We will plan for CBC and Cd8 in one month after starting this lower dose. Orders are in.

## 2018-11-08 NOTE — TELEPHONE ENCOUNTER
----- Message from Ximena Hurd sent at 11/8/2018  3:59 PM CST -----  Patient Returning Call from [facility]    Who Left Message for Patient:Lydia  Communication Preference:pt@ [phone]  Additional Information:

## 2018-11-09 NOTE — TELEPHONE ENCOUNTER
Call placed to pt. Advised that he begin taking Chano once daily and will recheck labs at f/u appt in December. Verbalizes understanding.    Appt reminder also postal mailed to pt.

## 2018-11-20 ENCOUNTER — CLINICAL SUPPORT (OUTPATIENT)
Dept: SMOKING CESSATION | Facility: CLINIC | Age: 48
End: 2018-11-20
Payer: COMMERCIAL

## 2018-11-20 DIAGNOSIS — F17.200 NICOTINE DEPENDENCE: Primary | ICD-10-CM

## 2018-11-20 PROCEDURE — 99407 BEHAV CHNG SMOKING > 10 MIN: CPT | Mod: S$GLB,,,

## 2018-11-20 NOTE — PROGRESS NOTES
Successful contact with patient regarding tobacco cessation quit #1. Pt states, he was unable to become tobacco free and he is not ready to return to the program at this time.  Pt informed of his benefit status, future telephone follow ups, and contact information to schedule an appointment when he is ready. Will update the tobacco cessation smart form for 6 months on quit #1.

## 2018-11-23 ENCOUNTER — PATIENT MESSAGE (OUTPATIENT)
Dept: INTERNAL MEDICINE | Facility: CLINIC | Age: 48
End: 2018-11-23

## 2018-11-26 ENCOUNTER — PATIENT MESSAGE (OUTPATIENT)
Dept: INTERNAL MEDICINE | Facility: CLINIC | Age: 48
End: 2018-11-26

## 2018-11-27 ENCOUNTER — PATIENT MESSAGE (OUTPATIENT)
Dept: INTERNAL MEDICINE | Facility: CLINIC | Age: 48
End: 2018-11-27

## 2018-11-28 ENCOUNTER — PATIENT MESSAGE (OUTPATIENT)
Dept: INTERNAL MEDICINE | Facility: CLINIC | Age: 48
End: 2018-11-28

## 2018-11-30 ENCOUNTER — PATIENT MESSAGE (OUTPATIENT)
Dept: INTERNAL MEDICINE | Facility: CLINIC | Age: 48
End: 2018-11-30

## 2018-12-02 ENCOUNTER — HOSPITAL ENCOUNTER (EMERGENCY)
Facility: HOSPITAL | Age: 48
Discharge: HOME | End: 2018-12-02
Attending: INTERNAL MEDICINE
Payer: MEDICAID

## 2018-12-02 VITALS
BODY MASS INDEX: 27.57 KG/M2 | WEIGHT: 161.5 LBS | HEART RATE: 91 BPM | TEMPERATURE: 98 F | OXYGEN SATURATION: 95 % | RESPIRATION RATE: 20 BRPM | HEIGHT: 64 IN | SYSTOLIC BLOOD PRESSURE: 117 MMHG | DIASTOLIC BLOOD PRESSURE: 76 MMHG

## 2018-12-02 DIAGNOSIS — G35 MS (MULTIPLE SCLEROSIS): ICD-10-CM

## 2018-12-02 DIAGNOSIS — G89.4 CHRONIC PAIN SYNDROME: ICD-10-CM

## 2018-12-02 DIAGNOSIS — G25.81 RLS (RESTLESS LEGS SYNDROME): Primary | ICD-10-CM

## 2018-12-02 PROCEDURE — 25000003 PHARM REV CODE 250: Performed by: INTERNAL MEDICINE

## 2018-12-02 PROCEDURE — 99284 EMERGENCY DEPT VISIT MOD MDM: CPT

## 2018-12-02 RX ORDER — CLONAZEPAM 0.5 MG/1
0.5 TABLET ORAL
Status: COMPLETED | OUTPATIENT
Start: 2018-12-02 | End: 2018-12-02

## 2018-12-02 RX ORDER — CLONAZEPAM 0.5 MG/1
0.5 TABLET ORAL 2 TIMES DAILY PRN
Qty: 60 TABLET | Refills: 1 | Status: SHIPPED | OUTPATIENT
Start: 2018-12-02 | End: 2019-02-04 | Stop reason: SDUPTHER

## 2018-12-02 RX ORDER — ROPINIROLE 0.25 MG/1
0.25 TABLET, FILM COATED ORAL
Status: COMPLETED | OUTPATIENT
Start: 2018-12-02 | End: 2018-12-02

## 2018-12-02 RX ORDER — ROPINIROLE 0.5 MG/1
0.5 TABLET, FILM COATED ORAL 3 TIMES DAILY
Qty: 90 TABLET | Refills: 1 | Status: SHIPPED | OUTPATIENT
Start: 2018-12-02 | End: 2019-01-24 | Stop reason: SDUPTHER

## 2018-12-02 RX ORDER — CLONAZEPAM 1 MG/1
1 TABLET ORAL
Status: DISCONTINUED | OUTPATIENT
Start: 2018-12-02 | End: 2018-12-02

## 2018-12-02 RX ADMIN — ROPINIROLE HYDROCHLORIDE 0.25 MG: 0.25 TABLET, FILM COATED ORAL at 02:12

## 2018-12-02 RX ADMIN — CLONAZEPAM 0.5 MG: 0.5 TABLET ORAL at 01:12

## 2018-12-02 NOTE — ED PROVIDER NOTES
"SCRIBE #1 NOTE: I, Corinnecamilo Ross, am scribing for, and in the presence of, Paul Baeza MD. I have scribed the entire note.      History      Chief Complaint   Patient presents with    Multiple Sclerosis     pt states, "I think I'm having an MS attack," pt c/o pain, fatiuge, states he is "unable to stretch," x1 day       Review of patient's allergies indicates:   Allergen Reactions    Cat/feline products     Chocolate flavor Itching    Dog hair standardized allergenic extract         HPI   HPI    12/2/2018, 1:05 PM   History obtained from the patient      History of Present Illness: Logan Haywood is a 48 y.o. male patient with PMHx of MS who presents to the Emergency Department for chronic generalized pain and restless legs which onset gradually yesterday. Pt states, "I feel like I am having a MS attack. I feel like I need to move, like I can't stretch and get this energy out". Pt has been unable to sleep because of this. Pt reports that he is compliant with his medications, but is having no relief. Symptoms are constant and moderate in severity. No mitigating or exacerbating factors reported. Associated sxs include fatigue and generalized muscle spasms. Patient denies any fever, chills, CP, SOB, N/V/D, back pain, neck pain, HA, dizziness, and all other sxs at this time. No prior Tx reported. No further complaints or concerns at this time.         Arrival mode: Personal vehicle    PCP: Angel Simon MD       Past Medical History:  Past Medical History:   Diagnosis Date    Neuromuscular disorder     MS       Past Surgical History:  Past Surgical History:   Procedure Laterality Date    TONSILLECTOMY           Family History:  Family History   Problem Relation Age of Onset    Hypertension Mother     COPD Mother     Heart disease Father     Diabetes Sister     Stroke Brother        Social History:  Social History     Tobacco Use    Smoking status: Current Every Day Smoker     Packs/day: 1.00 "     Years: 32.00     Pack years: 32.00     Types: Cigarettes    Smokeless tobacco: Never Used   Substance and Sexual Activity    Alcohol use: Yes     Comment: seldom    Drug use: No    Sexual activity: Unknown       ROS   Review of Systems   Constitutional: Positive for fatigue. Negative for chills and fever.   Respiratory: Negative for cough and shortness of breath.    Cardiovascular: Negative for chest pain and leg swelling.   Gastrointestinal: Negative for abdominal pain, diarrhea, nausea and vomiting.   Musculoskeletal: Positive for myalgias (generalized; chronic). Negative for back pain, neck pain and neck stiffness.        (+) generalized muscle spasms  (+) restless BLE   Skin: Negative for rash and wound.   Neurological: Negative for dizziness, light-headedness, numbness and headaches.   All other systems reviewed and are negative.    Physical Exam      Initial Vitals [12/02/18 1218]   BP Pulse Resp Temp SpO2   (!) 144/84 100 20 97.7 °F (36.5 °C) 97 %      MAP       --          Physical Exam  Nursing Notes and Vital Signs Reviewed.  Constitutional: Patient is in no acute distress. Well-developed and well-nourished. Appears restless.  Head: Atraumatic. Normocephalic.  Eyes: PERRL. EOM intact. Conjunctivae are not pale. No scleral icterus.  ENT: Mucous membranes are moist. Oropharynx is clear and symmetric.    Neck: Supple. Full ROM. No lymphadenopathy.  Cardiovascular: Regular rate. Regular rhythm. No murmurs, rubs, or gallops. Distal pulses are 2+ and symmetric.  Pulmonary/Chest: No respiratory distress. Clear to auscultation bilaterally. No wheezing or rales.  Abdominal: Soft and non-distended.  There is no tenderness.  No rebound, guarding, or rigidity.   Musculoskeletal: Moves all extremities. No obvious deformities. No edema.   Skin: Warm and dry.  Neurological:  Alert, awake, and appropriate.  Normal speech.  No acute focal neurological deficits are appreciated.  Psychiatric: Normal affect. Good eye  "contact. Appropriate in content.    ED Course    Procedures  ED Vital Signs:  Vitals:    12/02/18 1218 12/02/18 1447   BP: (!) 144/84 117/76   Pulse: 100 91   Resp: 20    Temp: 97.7 °F (36.5 °C) 97.7 °F (36.5 °C)   TempSrc: Oral Oral   SpO2: 97% 95%   Weight: 73.2 kg (161 lb 7.8 oz)    Height: 5' 4" (1.626 m)        Abnormal Lab Results:  Labs Reviewed - No data to display     All Lab Results:  None    Imaging Results:  Imaging Results    None                 The Emergency Provider reviewed the vital signs and test results, which are outlined above.    ED Discussion     1:46 PM: Re-evaluated pt. Pt is resting comfortably and is in no acute distress.  Pt states he is feeling better.  D/w pt all pertinent results. D/w pt any concerns expressed at this time. Answered all questions. Pt expresses understanding at this time.    2:16 PM: Reassessed pt at this time. Pt is awake, alert, and in no distress. Discussed with pt all pertinent ED information and results. Discussed pt dx and plan of tx. Gave pt all f/u and return to the ED instructions. All questions and concerns were addressed at this time. Pt expresses understanding of information and instructions, and is comfortable with plan to discharge. Pt is stable for discharge.    I discussed with patient and/or family/caretaker that evaluation in the ED does not suggest any emergent or life threatening medical conditions requiring immediate intervention beyond what was provided in the ED, and I believe patient is safe for discharge.  Regardless, an unremarkable evaluation in the ED does not preclude the development or presence of a serious of life threatening condition. As such, patient was instructed to return immediately for any worsening or change in current symptoms.      ED Medication(s):  Medications   clonazePAM tablet 0.5 mg (0.5 mg Oral Given 12/2/18 1328)   rOPINIRole tablet 0.25 mg (0.25 mg Oral Given 12/2/18 1403)          Medication List      START taking " these medications    clonazePAM 0.5 MG tablet  Commonly known as:  KLONOPIN  Take 1 tablet (0.5 mg total) by mouth 2 (two) times daily as needed for Anxiety.     rOPINIRole 0.5 MG tablet  Commonly known as:  REQUIP  Take 1 tablet (0.5 mg total) by mouth 3 (three) times daily.        ASK your doctor about these medications    amantadine HCl 100 mg capsule  Commonly known as:  SYMMETREL  Take 1 capsule (100 mg total) by mouth 2 (two) times daily.     amitriptyline 100 MG tablet  Commonly known as:  ELAVIL  Take 1 tablet (100 mg total) by mouth every evening.     dimethyl fumarate 240 mg Cpdr  Commonly known as:  TECFIDERA  Take 240 mg by mouth 2 (two) times daily.     gabapentin 600 MG tablet  Commonly known as:  NEURONTIN  Take 1 tablet (600 mg total) by mouth 3 (three) times daily.     multivitamin capsule           Where to Get Your Medications      You can get these medications from any pharmacy    Bring a paper prescription for each of these medications  · clonazePAM 0.5 MG tablet  · rOPINIRole 0.5 MG tablet         Follow-up Information     Go to  [facility] - .    Specialty:  Emergency Medicine  Why:  If symptoms worsen  Contact information:  [address]  [facility] [address] [phone]                   Medical Decision Making              Scribe Attestation:   Scribe #1: I performed the above scribed service and the documentation accurately describes the services I performed. I attest to the accuracy of the note 12/02/2018 2:17 PM.    Attending:   Physician Attestation Statement for Scribe #1: I, Paul Baeza MD, personally performed the services described in this documentation, as scribed by Corinne Mack, in my presence, and it is both accurate and complete.          Clinical Impression       ICD-10-CM ICD-9-CM   1. RLS (restless legs syndrome) G25.81 333.94   2. MS (multiple sclerosis) G35 340   3. Chronic pain syndrome G89.4 338.4       Disposition:    Disposition: Discharged  Condition: Stable           Paul Baeza MD  12/02/18 2217

## 2018-12-02 NOTE — DISCHARGE INSTRUCTIONS
Follow-up with your neurologist and primary care physician for chronic conditions.  Stop the Neurontin as it is not working.

## 2018-12-04 ENCOUNTER — OFFICE VISIT (OUTPATIENT)
Dept: INTERNAL MEDICINE | Facility: CLINIC | Age: 48
End: 2018-12-04
Payer: MEDICAID

## 2018-12-04 ENCOUNTER — OFFICE VISIT (OUTPATIENT)
Dept: OPHTHALMOLOGY | Facility: CLINIC | Age: 48
End: 2018-12-04
Payer: MEDICAID

## 2018-12-04 VITALS
RESPIRATION RATE: 20 BRPM | WEIGHT: 165.81 LBS | OXYGEN SATURATION: 99 % | TEMPERATURE: 99 F | BODY MASS INDEX: 28.31 KG/M2 | HEIGHT: 64 IN | DIASTOLIC BLOOD PRESSURE: 82 MMHG | SYSTOLIC BLOOD PRESSURE: 126 MMHG | HEART RATE: 110 BPM

## 2018-12-04 DIAGNOSIS — G89.29 OTHER CHRONIC PAIN: ICD-10-CM

## 2018-12-04 DIAGNOSIS — H53.2 TRANSIENT DIPLOPIA: Primary | ICD-10-CM

## 2018-12-04 DIAGNOSIS — G35 MS (MULTIPLE SCLEROSIS): Primary | ICD-10-CM

## 2018-12-04 DIAGNOSIS — H52.4 BILATERAL PRESBYOPIA: ICD-10-CM

## 2018-12-04 DIAGNOSIS — H52.13 MYOPIA, BILATERAL: ICD-10-CM

## 2018-12-04 PROCEDURE — 99999 PR PBB SHADOW E&M-EST. PATIENT-LVL II: CPT | Mod: PBBFAC,,, | Performed by: OPTOMETRIST

## 2018-12-04 PROCEDURE — 99999 PR PBB SHADOW E&M-EST. PATIENT-LVL IV: CPT | Mod: PBBFAC,,, | Performed by: FAMILY MEDICINE

## 2018-12-04 PROCEDURE — 92015 DETERMINE REFRACTIVE STATE: CPT | Mod: ,,, | Performed by: OPTOMETRIST

## 2018-12-04 PROCEDURE — 99214 OFFICE O/P EST MOD 30 MIN: CPT | Mod: S$PBB,,, | Performed by: FAMILY MEDICINE

## 2018-12-04 PROCEDURE — 92004 COMPRE OPH EXAM NEW PT 1/>: CPT | Mod: S$PBB,,, | Performed by: OPTOMETRIST

## 2018-12-04 PROCEDURE — 99212 OFFICE O/P EST SF 10 MIN: CPT | Mod: PBBFAC | Performed by: OPTOMETRIST

## 2018-12-04 PROCEDURE — 99214 OFFICE O/P EST MOD 30 MIN: CPT | Mod: PBBFAC,27 | Performed by: FAMILY MEDICINE

## 2018-12-04 RX ORDER — MELOXICAM 15 MG/1
15 TABLET ORAL DAILY
Qty: 30 TABLET | Refills: 0 | Status: SHIPPED | OUTPATIENT
Start: 2018-12-04 | End: 2019-01-08

## 2018-12-04 NOTE — PROGRESS NOTES
HPI     Blurred vision at distance sometimes with glasses.  Patient seeing double sometimes since 02/2018 possible due to MS.  Patient states he is noting seeing double today.  New patient last eye exam not sure.  Update glasses RX.    Last edited by Annette Dodson on 12/4/2018  1:14 PM. (History)            Assessment /Plan     For exam results, see Encounter Report.    Transient diplopia    Myopia, bilateral    Bilateral presbyopia      Diplopia due to fatigue with loss of fusion. Last just moments, strains briefly to regain fusion.    Dispense Final Rx for glasses.  RTC 1 year, sooner if diplopia increases in frequency or duration.  Discussed above and answered questions.

## 2018-12-04 NOTE — LETTER
December 4, 2018      Angel Simon MD  [address] [place] [id number]           [facility]  [address] [place] [address]  Phone: [phone]  Fax: [phone]          Patient: Logan Haywood   MR Number: [id number]   YOB: 1970   Date of Visit: 12/4/2018       Dear Dr. Angel Simon:    Thank you for referring Logan Haywood to me for evaluation. Attached you will find relevant portions of my assessment and plan of care.    If you have questions, please do not hesitate to call me. I look forward to following Logan Haywood along with you.    Sincerely,    Rene Pina, OD    Enclosure  CC:  No Recipients    If you would like to receive this communication electronically, please contact [email or web address] or [phone] to request more information on [employer] Link access.    For providers and/or their staff who would like to refer a patient to [facility], please contact us through our one-stop-shop provider referral line, Kailey Ellison, at [phone].    If you feel you have received this communication in error or would no longer like to receive these types of communications, please e-mail [email or web address]

## 2018-12-04 NOTE — PROGRESS NOTES
"Subjective:       Patient ID: Logan Haywood is a 48 y.o. male.    Chief Complaint: Pain    HPI     47 yo M    "dying from pain"  Recent ER visit.  McLeod like had electricity running thru him.  Tense  The next day he had a lot of pain.    In the ER he took Requip and Clonazepam -     Constant pain.    Gabapentin - "swallowing like water"  Reports took too much once - feels may have caused double vision.    Taking klonopin BID PRN          Review of Systems   Constitutional: Negative for activity change and unexpected weight change.   HENT: Positive for rhinorrhea and trouble swallowing. Negative for hearing loss.    Eyes: Positive for discharge and visual disturbance.   Respiratory: Negative for chest tightness and wheezing.    Cardiovascular: Negative for chest pain and palpitations.   Gastrointestinal: Positive for constipation and diarrhea. Negative for blood in stool and vomiting.   Endocrine: Positive for polydipsia. Negative for polyuria.   Genitourinary: Positive for difficulty urinating and urgency. Negative for hematuria.   Musculoskeletal: Positive for arthralgias and neck pain. Negative for joint swelling.   Neurological: Positive for weakness and headaches.   Psychiatric/Behavioral: Positive for confusion and dysphoric mood.       Objective:       Vitals:    12/04/18 1410   BP: 126/82   Pulse: 110   Resp: 20   Temp: 99 °F (37.2 °C)       Physical Exam   Constitutional: He is oriented to person, place, and time. He appears well-developed and well-nourished. No distress.   HENT:   Head: Normocephalic and atraumatic.   Right Ear: Hearing, tympanic membrane, external ear and ear canal normal.   Left Ear: Hearing, tympanic membrane, external ear and ear canal normal.   Nose: Nose normal. Right sinus exhibits no maxillary sinus tenderness and no frontal sinus tenderness. Left sinus exhibits no maxillary sinus tenderness and no frontal sinus tenderness.   Mouth/Throat: Uvula is midline, oropharynx is clear " and moist and mucous membranes are normal.   Eyes: Conjunctivae are normal. Right eye exhibits no discharge. Left eye exhibits no discharge.   Neck: Trachea normal, normal range of motion and full passive range of motion without pain.   Cardiovascular: Normal rate, regular rhythm, normal heart sounds and intact distal pulses.   Pulmonary/Chest: Effort normal and breath sounds normal. No respiratory distress. He has no decreased breath sounds. He has no wheezes.   Abdominal: Soft. Normal appearance and bowel sounds are normal. He exhibits no distension and no mass. There is no tenderness. There is no guarding. No hernia.   Musculoskeletal: Normal range of motion. He exhibits no edema or deformity.   Lymphadenopathy:     He has no cervical adenopathy.   Neurological: He is alert and oriented to person, place, and time.   Skin: Skin is warm, dry and intact. No rash noted. No erythema. No pallor.   Psychiatric: He has a normal mood and affect. His speech is normal and behavior is normal. Thought content normal.       Assessment:       1. MS (multiple sclerosis)    2. Other chronic pain        Plan:   MS (multiple sclerosis)  -     Cancel: Ambulatory consult to Pain Clinic  -     Ambulatory consult to Neurology     chronic pain  -     Cancel: Ambulatory consult to Pain Clinic  -     Ambulatory consult to Neurology    Patient has MS.  He is unable to make it to his [place] neurologist.  Attempted to get him with [facility] - unable due to insurance.  Will consult pain management for help with his chronic pain.    Has had great benefit with requip and clonazepam.  I do not want to have him on both benzo and opioids.  Will discuss w / neurology for further evaluation.     Trial of Mobic for pain to assess if it helps at all  F/u in 1 month -   He will need a refill on requip and benzo.    On Elavil  And gabapentin.  neither has been beneficial    4 week follow up.  No Follow-up on file.

## 2018-12-06 ENCOUNTER — PATIENT MESSAGE (OUTPATIENT)
Dept: INTERNAL MEDICINE | Facility: CLINIC | Age: 48
End: 2018-12-06

## 2018-12-06 ENCOUNTER — TELEPHONE (OUTPATIENT)
Dept: INTERNAL MEDICINE | Facility: CLINIC | Age: 48
End: 2018-12-06

## 2018-12-06 ENCOUNTER — TELEPHONE (OUTPATIENT)
Dept: NEUROLOGY | Facility: CLINIC | Age: 48
End: 2018-12-06

## 2018-12-06 NOTE — TELEPHONE ENCOUNTER
----- Message from Verona Horton LPN sent at 12/6/2018 11:47 AM CST -----  Hello this patient need an MS specialist.  Can Dr Baeza see this patient?  If so can you please schedule him.  He has medicaid insurance.  Thanks

## 2018-12-06 NOTE — TELEPHONE ENCOUNTER
Called pt but hes not home. Mother was unable to understand the reason for call. L/m to call back. 12/06/1806/18/1210/sf

## 2018-12-06 NOTE — TELEPHONE ENCOUNTER
----- Message from Flor Pina LPN sent at 12/6/2018  1:24 PM CST -----   I was able to get him on jan 8th.thanks  ----- Message -----  From: Verona Horton LPN  Sent: 12/6/2018  12:53 PM  To: Flor Pina LPN    He says that he has no transportation an is unable to make it to his appointment.  I tried with [facility] but they do not accept his insurance.  If we can get him in with someone here in its fine.  Thanks.  ----- Message -----  From: Flor Pina LPN  Sent: 12/6/2018  11:58 AM  To: Verona Horton LPN    Heloo! Unfortunatley, [place] and schedule is full through January. I will give appt with the first available neuro here at [facility]. thanks  ----- Message -----  From: Verona Horton LPN  Sent: 12/6/2018  11:47 AM  To: Aryan Gamino this patient need an MS specialist.  Can Dr Brooks see this patient?  If so can you please schedule him.  He has medicaid insurance.  Thanks

## 2018-12-13 ENCOUNTER — PATIENT MESSAGE (OUTPATIENT)
Dept: NEUROLOGY | Facility: CLINIC | Age: 48
End: 2018-12-13

## 2018-12-15 ENCOUNTER — PATIENT MESSAGE (OUTPATIENT)
Dept: NEUROLOGY | Facility: CLINIC | Age: 48
End: 2018-12-15

## 2018-12-18 ENCOUNTER — PATIENT MESSAGE (OUTPATIENT)
Dept: NEUROLOGY | Facility: CLINIC | Age: 48
End: 2018-12-18

## 2018-12-19 ENCOUNTER — PATIENT MESSAGE (OUTPATIENT)
Dept: NEUROLOGY | Facility: CLINIC | Age: 48
End: 2018-12-19

## 2018-12-20 ENCOUNTER — LAB VISIT (OUTPATIENT)
Dept: LAB | Facility: HOSPITAL | Age: 48
End: 2018-12-20
Attending: PSYCHIATRY & NEUROLOGY
Payer: MEDICAID

## 2018-12-20 ENCOUNTER — PATIENT MESSAGE (OUTPATIENT)
Dept: NEUROLOGY | Facility: CLINIC | Age: 48
End: 2018-12-20

## 2018-12-20 DIAGNOSIS — G35 MULTIPLE SCLEROSIS, RELAPSING-REMITTING: ICD-10-CM

## 2018-12-20 DIAGNOSIS — G63 NEUROPATHY DUE TO MEDICAL CONDITION: ICD-10-CM

## 2018-12-20 DIAGNOSIS — Z51.81 ENCOUNTER FOR THERAPEUTIC DRUG MONITORING: ICD-10-CM

## 2018-12-20 DIAGNOSIS — R26.81 GAIT INSTABILITY: ICD-10-CM

## 2018-12-20 LAB
ALBUMIN SERPL BCP-MCNC: 4.1 G/DL
ALP SERPL-CCNC: 79 U/L
ALT SERPL W/O P-5'-P-CCNC: 62 U/L
ANION GAP SERPL CALC-SCNC: 11 MMOL/L
AST SERPL-CCNC: 25 U/L
BASOPHILS # BLD AUTO: 0.03 K/UL
BASOPHILS NFR BLD: 0.6 %
BILIRUB SERPL-MCNC: 0.5 MG/DL
BUN SERPL-MCNC: 10 MG/DL
CALCIUM SERPL-MCNC: 9.8 MG/DL
CHLORIDE SERPL-SCNC: 103 MMOL/L
CO2 SERPL-SCNC: 28 MMOL/L
CREAT SERPL-MCNC: 0.8 MG/DL
DIFFERENTIAL METHOD: ABNORMAL
EOSINOPHIL # BLD AUTO: 0.2 K/UL
EOSINOPHIL NFR BLD: 3.4 %
ERYTHROCYTE [DISTWIDTH] IN BLOOD BY AUTOMATED COUNT: 12.9 %
EST. GFR  (AFRICAN AMERICAN): >60 ML/MIN/1.73 M^2
EST. GFR  (NON AFRICAN AMERICAN): >60 ML/MIN/1.73 M^2
GLUCOSE SERPL-MCNC: 150 MG/DL
HCT VFR BLD AUTO: 49.2 %
HGB BLD-MCNC: 16.3 G/DL
IMM GRANULOCYTES # BLD AUTO: 0.1 K/UL
IMM GRANULOCYTES NFR BLD AUTO: 1.9 %
LYMPHOCYTES # BLD AUTO: 0.5 K/UL
LYMPHOCYTES NFR BLD: 9.6 %
MCH RBC QN AUTO: 29.5 PG
MCHC RBC AUTO-ENTMCNC: 33.1 G/DL
MCV RBC AUTO: 89 FL
MONOCYTES # BLD AUTO: 0.4 K/UL
MONOCYTES NFR BLD: 6.7 %
NEUTROPHILS # BLD AUTO: 4.1 K/UL
NEUTROPHILS NFR BLD: 77.8 %
NRBC BLD-RTO: 0 /100 WBC
PLATELET # BLD AUTO: 180 K/UL
PMV BLD AUTO: 10.2 FL
POTASSIUM SERPL-SCNC: 4 MMOL/L
PROT SERPL-MCNC: 7.7 G/DL
RBC # BLD AUTO: 5.53 M/UL
SODIUM SERPL-SCNC: 142 MMOL/L
WBC # BLD AUTO: 5.23 K/UL

## 2018-12-20 PROCEDURE — 86360 T CELL ABSOLUTE COUNT/RATIO: CPT

## 2018-12-20 PROCEDURE — 36415 COLL VENOUS BLD VENIPUNCTURE: CPT | Mod: PO

## 2018-12-20 PROCEDURE — 80053 COMPREHEN METABOLIC PANEL: CPT

## 2018-12-20 PROCEDURE — 85025 COMPLETE CBC W/AUTO DIFF WBC: CPT

## 2018-12-20 PROCEDURE — 86359 T CELLS TOTAL COUNT: CPT

## 2018-12-21 LAB
ABSOLUTE CD3: 297 CELLS/UL (ref 700–2100)
ABSOLUTE CD8: 50.3 CELLS/UL (ref 200–900)
CD3%: 63.4 % (ref 55–83)
CD3+CD4+ CELLS # BLD: 243 CELLS/UL (ref 300–1400)
CD3+CD4+ CELLS NFR BLD: 51.9 % (ref 28–57)
CD4/CD8 RATIO: 4.83 (ref 0.9–3.6)
CD8 % SUPPRESSOR T CELL: 10.7 % (ref 10–39)

## 2018-12-27 ENCOUNTER — PATIENT MESSAGE (OUTPATIENT)
Dept: NEUROLOGY | Facility: CLINIC | Age: 48
End: 2018-12-27

## 2019-01-08 ENCOUNTER — TELEPHONE (OUTPATIENT)
Dept: NEUROLOGY | Facility: CLINIC | Age: 49
End: 2019-01-08

## 2019-01-08 ENCOUNTER — PATIENT MESSAGE (OUTPATIENT)
Dept: NEUROLOGY | Facility: CLINIC | Age: 49
End: 2019-01-08

## 2019-01-08 ENCOUNTER — OFFICE VISIT (OUTPATIENT)
Dept: NEUROLOGY | Facility: CLINIC | Age: 49
End: 2019-01-08
Payer: MEDICAID

## 2019-01-08 VITALS
RESPIRATION RATE: 20 BRPM | HEART RATE: 106 BPM | HEIGHT: 64 IN | SYSTOLIC BLOOD PRESSURE: 118 MMHG | BODY MASS INDEX: 28.83 KG/M2 | DIASTOLIC BLOOD PRESSURE: 78 MMHG | WEIGHT: 168.88 LBS

## 2019-01-08 DIAGNOSIS — G35 MS (MULTIPLE SCLEROSIS): Primary | ICD-10-CM

## 2019-01-08 DIAGNOSIS — R20.2 NUMBNESS AND TINGLING: ICD-10-CM

## 2019-01-08 DIAGNOSIS — R20.0 NUMBNESS AND TINGLING: ICD-10-CM

## 2019-01-08 DIAGNOSIS — G89.4 PAIN AMPLIFICATION SYNDROME: ICD-10-CM

## 2019-01-08 PROCEDURE — 99213 OFFICE O/P EST LOW 20 MIN: CPT | Mod: PBBFAC,PO | Performed by: PSYCHIATRY & NEUROLOGY

## 2019-01-08 PROCEDURE — 99213 OFFICE O/P EST LOW 20 MIN: CPT | Mod: S$PBB,,, | Performed by: PSYCHIATRY & NEUROLOGY

## 2019-01-08 PROCEDURE — 99999 PR PBB SHADOW E&M-EST. PATIENT-LVL III: CPT | Mod: PBBFAC,,, | Performed by: PSYCHIATRY & NEUROLOGY

## 2019-01-08 PROCEDURE — 99213 PR OFFICE/OUTPT VISIT, EST, LEVL III, 20-29 MIN: ICD-10-PCS | Mod: S$PBB,,, | Performed by: PSYCHIATRY & NEUROLOGY

## 2019-01-08 PROCEDURE — 99999 PR PBB SHADOW E&M-EST. PATIENT-LVL III: ICD-10-PCS | Mod: PBBFAC,,, | Performed by: PSYCHIATRY & NEUROLOGY

## 2019-01-08 NOTE — LETTER
January 10, 2019      Angel Simon MD  [address]  [place] [address]           [facility]  [address] [place] [address]  Phone: [phone]          Patient: Logan Haywood   MR Number: [id number]   YOB: 1970   Date of Visit: 1/8/2019       Dear Dr. Angel Simon:    Thank you for referring Logan Haywood to me for evaluation. Attached you will find relevant portions of my assessment and plan of care.    If you have questions, please do not hesitate to call me. I look forward to following Logan Haywood along with you.    Sincerely,    Genny Baeza MD    Enclosure  CC:  No Recipients    If you would like to receive this communication electronically, please contact [email or web address] or [phone] to request more information on [employer] Link access.    For providers and/or their staff who would like to refer a patient to [facility], please contact us through our one-stop-shop provider referral line, Kailey Ellison, at [phone].    If you feel you have received this communication in error or would no longer like to receive these types of communications, please e-mail [email or web address]

## 2019-01-08 NOTE — TELEPHONE ENCOUNTER
----- Message from Scot Gibbs sent at 1/8/2019  1:31 PM CST -----  Contact: pt   Pt returning call in reference to referral to MS specialist.           [phone]

## 2019-01-08 NOTE — TELEPHONE ENCOUNTER
Called pt to let him know that he will need to pursue his provider from insurance list to find one for ms spec not to go to [facility]. He repeated back the info and says he understands.also informed pt that medicaid does provide transportation to and from appts with certain companies with prior notice. He says ralfy cecilia for the info.11/8/198/19/1425/sf

## 2019-01-09 ENCOUNTER — TELEPHONE (OUTPATIENT)
Dept: NEUROLOGY | Facility: CLINIC | Age: 49
End: 2019-01-09

## 2019-01-09 NOTE — TELEPHONE ENCOUNTER
----- Message from Evan Munoz sent at 1/9/2019 11:54 AM CST -----  Contact: Patient @ [phone]  Patient calling to schedule a f/u appt, pls call

## 2019-01-10 ENCOUNTER — TELEPHONE (OUTPATIENT)
Dept: NEUROLOGY | Facility: CLINIC | Age: 49
End: 2019-01-10

## 2019-01-10 NOTE — TELEPHONE ENCOUNTER
----- Message from Yvonne Valdez sent at 1/10/2019 10:03 AM CST -----  Contact: self @ [phone]  Would like to speak with Dr Mills concerning his medication.  Would also like to schedule a f/u appt with Dr Mills.

## 2019-01-14 PROBLEM — G89.4 PAIN AMPLIFICATION SYNDROME: Status: ACTIVE | Noted: 2019-01-14

## 2019-01-14 NOTE — PROGRESS NOTES
A 48-year-old gentleman diagnosed with multiple sclerosis.  He is also getting   followed up in [place] under care of Dr. Anna Gamino, MS specialist.  He   is here today for increasing pain, paresthesia in his body.  He said that he had   difficulties in going to [place].  So, he is here.  He is telling that he   is suffering with pain.  We have reviewed the chart talked to him that he needs   specialistic care.  Dr. Anna Gamino wanted that he sees a specialist, Dr. Miranda Branch here in [place].  I also agreed with her and also made referral   to Dr. Miradna Branch . He  has been given that he needs to keep follow up with   specialist because he might need MS specialist here.

## 2019-01-15 ENCOUNTER — TELEPHONE (OUTPATIENT)
Dept: NEUROLOGY | Facility: CLINIC | Age: 49
End: 2019-01-15

## 2019-01-15 NOTE — TELEPHONE ENCOUNTER
Spoke with  Yobany and advised that he stop Tecfidera as of today.   Recent CD8=50; EUP=154.   He has an appt with Dr. Mills next week.

## 2019-01-18 ENCOUNTER — PATIENT MESSAGE (OUTPATIENT)
Dept: NEUROLOGY | Facility: CLINIC | Age: 49
End: 2019-01-18

## 2019-01-24 ENCOUNTER — PATIENT MESSAGE (OUTPATIENT)
Dept: INTERNAL MEDICINE | Facility: CLINIC | Age: 49
End: 2019-01-24

## 2019-01-24 ENCOUNTER — OFFICE VISIT (OUTPATIENT)
Dept: NEUROLOGY | Facility: CLINIC | Age: 49
End: 2019-01-24
Payer: MEDICAID

## 2019-01-24 VITALS
HEIGHT: 64 IN | WEIGHT: 172 LBS | HEART RATE: 96 BPM | DIASTOLIC BLOOD PRESSURE: 81 MMHG | BODY MASS INDEX: 29.37 KG/M2 | SYSTOLIC BLOOD PRESSURE: 127 MMHG

## 2019-01-24 DIAGNOSIS — R20.2 NUMBNESS AND TINGLING: ICD-10-CM

## 2019-01-24 DIAGNOSIS — G35 MULTIPLE SCLEROSIS: Primary | ICD-10-CM

## 2019-01-24 DIAGNOSIS — G25.81 RESTLESS LEG: ICD-10-CM

## 2019-01-24 DIAGNOSIS — Z79.899 HIGH RISK MEDICATION USE: ICD-10-CM

## 2019-01-24 DIAGNOSIS — Z72.0 TOBACCO ABUSE: ICD-10-CM

## 2019-01-24 DIAGNOSIS — R26.89 ABNORMALITY OF GAIT DUE TO IMPAIRMENT OF BALANCE: ICD-10-CM

## 2019-01-24 DIAGNOSIS — R20.0 NUMBNESS AND TINGLING: ICD-10-CM

## 2019-01-24 PROCEDURE — 99215 OFFICE O/P EST HI 40 MIN: CPT | Mod: S$PBB,,, | Performed by: PSYCHIATRY & NEUROLOGY

## 2019-01-24 PROCEDURE — 99999 PR PBB SHADOW E&M-EST. PATIENT-LVL IV: ICD-10-PCS | Mod: PBBFAC,,, | Performed by: PSYCHIATRY & NEUROLOGY

## 2019-01-24 PROCEDURE — 99215 PR OFFICE/OUTPT VISIT, EST, LEVL V, 40-54 MIN: ICD-10-PCS | Mod: S$PBB,,, | Performed by: PSYCHIATRY & NEUROLOGY

## 2019-01-24 PROCEDURE — 99214 OFFICE O/P EST MOD 30 MIN: CPT | Mod: PBBFAC | Performed by: PSYCHIATRY & NEUROLOGY

## 2019-01-24 PROCEDURE — 99999 PR PBB SHADOW E&M-EST. PATIENT-LVL IV: CPT | Mod: PBBFAC,,, | Performed by: PSYCHIATRY & NEUROLOGY

## 2019-01-24 RX ORDER — AMANTADINE HYDROCHLORIDE 100 MG/1
100 CAPSULE, GELATIN COATED ORAL 2 TIMES DAILY
Qty: 60 CAPSULE | Refills: 2 | Status: SHIPPED | OUTPATIENT
Start: 2019-01-24 | End: 2019-04-16 | Stop reason: SDUPTHER

## 2019-01-24 NOTE — PROGRESS NOTES
Subjective:       Patient ID: Logan Haywood is a 48 y.o. male who presents today for a routine clinic visit for MS followup.      MS History- initial note 3/2018 below:  RH male with history of mood disorders and substance abuse, MS diagnosed in 2/2018. At least for past 4-5 years, he has had numbness and painful tingling in feet and restless leg movements at night. Has always had issues with fatigue. No prior weakness. He was admitted to hospital ([facility]) 2/2018 and diagnosed with MS when he presented for numbness and weakness to the left side of body along with left-sided pain radiating down left body; no Lhermitte's. MRI brain demonstrated multiple elliptical foci of T2 hyperintensity perpendicular to the lateral ventricles and some subcortical in both frontal lobes, consistent with demyelination. There was one enhancing lesion in the subcortical region of the lateral left temporal lobe. He also had several lesions in C-spine with one enhancing at C2. He was advised to stay for LP and IV steroids, but patient chose to leave AMA. He returned to ED 2/27 for rest of w/u and treatment. He was treated with IV steroids and started on paroxetine for depression as he reported SI and clonazepam for restless legs/sleep. Following discharge, he was seen by Dr. Baeza for follow-up, but he was referred to MS center for second opinion of care. At the time, he reported right hand numbness in fingertips, but no new significant symptoms. When he lays down on left side, he feels altered sensation like there's a pillow between his legs. He was started on Tecfidera 3/2018, but initially not adherent; reported that he ran out and didn't know that he should continue it.       TODAY/SUBJECTIVE:  · DMT: Started Tecfidera 3/2018, some noncompliance initially but resumed medication in 5/2018.   · Side effects from DMT? Flushing (improved with aspirin) and infrequent diarrhea. However developed significant lymphopenia (trina 0.4 ALC  "on bid dosing, 0.5 on once daily dosing).  · Taking vitamin D3 as recommended? Not taking as he can't afford. Currently sharing mother's pdg rx. Dose: will be taking vit D 5000 iu  · Discussed other DMTs, would like to try Copaxone at this time.  · Neuropathic pain is improved on 100mg elavil nightly. Other pains have not - joint pain in hands, feet, and shoulders hurt. We discussed the purposes of elavil, which is neuropathic pain only.  · Still with complaints of fatigue. He has yet to try the amantadine but he is willing to try amantadine. He states that he never picked it up.  · His disability was denied and he is currently appealing the decision.      SOCIAL HISTORY  Social History     Tobacco Use    Smoking status: Current Every Day Smoker     Packs/day: 1.00     Years: 32.00     Pack years: 32.00     Types: Cigarettes    Smokeless tobacco: Never Used   Substance Use Topics    Alcohol use: Yes     Comment: seldom    Drug use: No   Living arrangements - the patient currently stays with mother.  Employment: Unemployed. Pdg disability decision.    Review of systems:   · Fatigue: Yes - "severe"   · Sleep Disturbance: Yes - no prior sleep study   · Bladder Dysfunction: No retention. Polyuria. Urge incontinence.  · Bowel Dysfunction: Yes - near accidents. 3 months prior to ED presentation, with bowel incontinence   · Spasticity: No   · Visual Symptoms: Yes - wears glasses, feels eyesight is worsening. No ophthalmologist   · Cognitive: Yes - sometimes with difficulty getting words out   · Mood Disorder: Yes - worry a lot, considered suicide, decreased appetite, anxiety, loss of energy. States that suicidal thoughts are "passing thoughts" and would never act on it.   · Gait Disturbance: Yes - unsteady due to left leg weakness, bump into walls. 1/2019 has stated insurance won't cover PT/OT and he can't afford it, but SW found several resources covered by patient's insurance.  · Falls: No  · Hand Dysfunction: Yes - " "left hand weakness, "cannot  anything"  · Pain: Yes - neck pain, back pain (near left shoulder blade), aching joints, plantar fasciitis in bilateral feet   · Sexual Dysfunction: Not Assessed   · Skin Breakdown: Yes - hair loss   · Tremors: No   · Dysphagia: Yes - difficulty getting food down.   · Dysarthria: No but occasionally will trip on words  · Heat sensitivity: Yes - increased fatigue   · Any un-met adaptive needs? none  · Copay Assist? Yes        Objective:        1. 25 foot timed walk: 1/2019 7.4s (w/o assist) <-- 5.78s (5/2018) <-- 3/2018 8.26s w/o assist  No flowsheet data found.  2. SDMT:  3. 9 Hole Peg Test:  No flowsheet data found.    Neurologic Exam      MENTAL STATUS: grossly intact. Normal language, attention, and memory.   CRANIAL NERVE EXAM: Extraocular muscles are intact.  No facial asymmetry. tongue midline. Shoulder shrug normal b/l. There is no dysarthria.  MOTOR EXAM: Normal bulk and tone throughout UE and LE bilaterally. Strength is 5/5 in all groups in the lower extremities and upper extremities except 5- left HF (pain limited).   COORDINATION: Normal finger-to-nose exam.   GAIT: normal narrow gait.      Imaging:       MRI Brain w/wo 9/2018 - Multiple foci of increased signal in a periventricular distribution bilaterally similar to the prior examination dated 02/26/2018.  No evidence of abnormal enhancement post contrast material.  No evidence of acute demyelination. 2/2018 - Findings are most concerning for demyelinating disease and multiple sclerosis.  One of the lesions within the subcortical white matter of the left temporal lobe demonstrates abnormal enhancement     MRI C-spine w/wo 9/2018 - Patchy areas of signal can be seen in the cord at C2, C3, C5, C7 and T2. This is unchanged when compared with the prior study dated 02/26/2018.  No abnormal enhancement to suspect acute demyelination. 2/2018 - There are four separate foci of abnormal T2 signal within the cervical cord as " discussed above.  The one at the level of C2 demonstrates abnormal enhancement.      Labs:     Lab Results   Component Value Date    RRCUABAV06WK 34 05/24/2018     No results found for: JCVINDEX, JCVANTIBODY  Lab Results   Component Value Date    NV6DKSHG 63.4 12/20/2018    ABSOLUTECD3 297 (L) 12/20/2018    US0NXHVQ 10.7 12/20/2018    ABSOLUTECD8 50.3 (L) 12/20/2018    OH1YNYHC 51.9 12/20/2018    ABSOLUTECD4 243 (L) 12/20/2018    LABCD48 4.83 (H) 12/20/2018     Lab Results   Component Value Date    WBC 5.23 12/20/2018    RBC 5.53 12/20/2018    HGB 16.3 12/20/2018    HCT 49.2 12/20/2018    MCV 89 12/20/2018    MCH 29.5 12/20/2018    MCHC 33.1 12/20/2018    RDW 12.9 12/20/2018     12/20/2018    MPV 10.2 12/20/2018    GRAN 4.1 12/20/2018    GRAN 77.8 (H) 12/20/2018    LYMPH 0.5 (L) 12/20/2018    LYMPH 9.6 (L) 12/20/2018    MONO 0.4 12/20/2018    MONO 6.7 12/20/2018    EOS 0.2 12/20/2018    BASO 0.03 12/20/2018    EOSINOPHIL 3.4 12/20/2018    BASOPHIL 0.6 12/20/2018       Chemistry        Component Value Date/Time     12/20/2018 2120    K 4.0 12/20/2018 2120     12/20/2018 2120    CO2 28 12/20/2018 2120    BUN 10 12/20/2018 2120    CREATININE 0.8 12/20/2018 2120     (H) 12/20/2018 2120        Component Value Date/Time    CALCIUM 9.8 12/20/2018 2120    ALKPHOS 79 12/20/2018 2120    AST 25 12/20/2018 2120    ALT 62 (H) 12/20/2018 2120    BILITOT 0.5 12/20/2018 2120    ESTGFRAFRICA >60.0 12/20/2018 2120    EGFRNONAA >60.0 12/20/2018 2120              Diagnosis/Assessment/Plan:   1. Multiple sclerosis  · Assessment: Diagnosed 2/2018 based on clinical history, MRI, and CSF studies. Initially started on Tecfidera, but developed severe lymphopenia. PARQ discussion held for other DMT options, and patient has decided to try Copaxone instead.   · Imaging: MRI brain and c-spine ~8/2019  · Labs: CBC and CMP for monitoring high risk safety medications. Drug screen to monitor recreational drug use  · DMT:  start Copaxone  · The the patient was counseled about the importance of vitamin D supplementation in multiple sclerosis, and the fact that recent data suggests that high circulating blood levels of vitamin D has an ameliorating effect on the disease course in multiple sclerosis in general. Repeat vit D level at this time. Encouraged compliance  · Will continue to encourage smoking cessation.     2. MS Symptomatic management:  · Fatigue/sleep: still may benefit from sleep study, but may be related to MS. Will trial amantadine.  · Bladder dysfunction: bowel/bladder incontinence. Urology referral placed but did not follow-up  · Visual symptoms: referred to optometry by PCP but yet to be seen  · Cognitive: may need neuropsych testing in future but suspect mood and sleep disorder contributing more than MS at this time  · Mood disorder: depressive symptoms present. Appears somewhat improved with elavil  · Pain: continue gabapentin for now  · Gait disturbance: outpatient PT/OT reordered to help with gait and balance and pain.  · Restless leg: continue requip    Seen with medical student.  RTC in 3 months      Over 50% of this 60 minute visit was spent in direct face to face counseling of the patient about MS, DMT management, and MS symptom management.         There are no diagnoses linked to this encounter.

## 2019-01-25 ENCOUNTER — DOCUMENTATION ONLY (OUTPATIENT)
Dept: NEUROLOGY | Facility: CLINIC | Age: 49
End: 2019-01-25

## 2019-01-25 RX ORDER — CLONAZEPAM 0.5 MG/1
0.5 TABLET ORAL 2 TIMES DAILY PRN
Qty: 60 TABLET | Refills: 1 | OUTPATIENT
Start: 2019-01-25 | End: 2020-01-25

## 2019-01-25 RX ORDER — ROPINIROLE 0.5 MG/1
0.5 TABLET, FILM COATED ORAL 3 TIMES DAILY
Qty: 90 TABLET | Refills: 1 | Status: SHIPPED | OUTPATIENT
Start: 2019-01-25 | End: 2019-02-04 | Stop reason: SDUPTHER

## 2019-01-25 RX ORDER — CLONAZEPAM 0.5 MG/1
TABLET ORAL
Qty: 60 TABLET | Refills: 0 | OUTPATIENT
Start: 2019-01-25

## 2019-01-27 ENCOUNTER — PATIENT MESSAGE (OUTPATIENT)
Dept: INTERNAL MEDICINE | Facility: CLINIC | Age: 49
End: 2019-01-27

## 2019-01-28 ENCOUNTER — TELEPHONE (OUTPATIENT)
Dept: NEUROLOGY | Facility: CLINIC | Age: 49
End: 2019-01-28

## 2019-01-28 DIAGNOSIS — G35 MULTIPLE SCLEROSIS: Primary | ICD-10-CM

## 2019-01-28 RX ORDER — ROPINIROLE 0.5 MG/1
TABLET, FILM COATED ORAL
Qty: 90 TABLET | Refills: 0 | OUTPATIENT
Start: 2019-01-28

## 2019-01-28 RX ORDER — AMITRIPTYLINE HYDROCHLORIDE 100 MG/1
TABLET ORAL
Qty: 90 TABLET | Refills: 0 | Status: SHIPPED | OUTPATIENT
Start: 2019-01-28 | End: 2019-04-29 | Stop reason: SDUPTHER

## 2019-01-28 NOTE — TELEPHONE ENCOUNTER
Informed pt message has been sent to provider to review labs and advise if okay to start Copaxone.

## 2019-01-28 NOTE — TELEPHONE ENCOUNTER
----- Message from Ev Bailey RN sent at 1/24/2019  2:58 PM CST -----      ----- Message -----  From: Maria Teresa Mills MD  Sent: 1/24/2019   2:14 PM  To: [facility] Morel Staff    Thinking of starting copaxone after labs return

## 2019-01-28 NOTE — TELEPHONE ENCOUNTER
----- Message from Chandrika De Jesus sent at 1/28/2019 12:38 PM CST -----  Contact: self  Patient is calling for a RX refill of Cloazepam .5 mg, Ropinirole .5mg called into [facility]. Please call pt if any questions [phone]

## 2019-01-28 NOTE — TELEPHONE ENCOUNTER
----- Message from Evan Munoz sent at 1/28/2019 11:02 AM CST -----  Contact: Patient @ [phone]  Patient requesting a return call regarding the pain injection, pls call to advise

## 2019-01-31 ENCOUNTER — TELEPHONE (OUTPATIENT)
Dept: NEUROLOGY | Facility: CLINIC | Age: 49
End: 2019-01-31

## 2019-01-31 NOTE — TELEPHONE ENCOUNTER
----- Message from Chico Flynn sent at 1/31/2019 11:56 AM CST -----  Needs Advice    Reason for call: Pt is calling to confirm if the doctor will send injection medicine to pharmacy. Pt is also calling to setup PT/OT, but referral is set to outgoing and pt said he would like to have it done at [facility], however he would like it done in [place] if there is a facility there.        Communication Preference: [phone]    Additional Information:

## 2019-02-01 RX ORDER — GLATIRAMER 40 MG/ML
40 INJECTION, SOLUTION SUBCUTANEOUS
Qty: 36 SYRINGE | Refills: 1 | Status: SHIPPED | OUTPATIENT
Start: 2019-02-01 | End: 2019-02-25

## 2019-02-01 NOTE — TELEPHONE ENCOUNTER
----- Message from Gee Sanchez sent at 2/1/2019  1:40 PM CST -----  Contact: Pt  Pt would like to be called back asap regarding a new prescription for MS.    Pt can be reached at [phone].    Thanks

## 2019-02-04 ENCOUNTER — PATIENT MESSAGE (OUTPATIENT)
Dept: NEUROLOGY | Facility: CLINIC | Age: 49
End: 2019-02-04

## 2019-02-04 ENCOUNTER — TELEPHONE (OUTPATIENT)
Dept: PHARMACY | Facility: CLINIC | Age: 49
End: 2019-02-04

## 2019-02-04 ENCOUNTER — OFFICE VISIT (OUTPATIENT)
Dept: INTERNAL MEDICINE | Facility: CLINIC | Age: 49
End: 2019-02-04
Payer: MEDICAID

## 2019-02-04 VITALS
RESPIRATION RATE: 20 BRPM | OXYGEN SATURATION: 97 % | WEIGHT: 171.06 LBS | HEIGHT: 64 IN | BODY MASS INDEX: 29.2 KG/M2 | DIASTOLIC BLOOD PRESSURE: 82 MMHG | TEMPERATURE: 98 F | HEART RATE: 100 BPM | SYSTOLIC BLOOD PRESSURE: 126 MMHG

## 2019-02-04 DIAGNOSIS — G25.81 RESTLESS LEG: ICD-10-CM

## 2019-02-04 DIAGNOSIS — G35 MS (MULTIPLE SCLEROSIS): Primary | ICD-10-CM

## 2019-02-04 DIAGNOSIS — F41.9 ANXIETY: ICD-10-CM

## 2019-02-04 DIAGNOSIS — G35 MULTIPLE SCLEROSIS: Primary | ICD-10-CM

## 2019-02-04 DIAGNOSIS — R26.9 GAIT DISTURBANCE: ICD-10-CM

## 2019-02-04 PROCEDURE — 99213 OFFICE O/P EST LOW 20 MIN: CPT | Mod: PBBFAC | Performed by: FAMILY MEDICINE

## 2019-02-04 PROCEDURE — 99999 PR PBB SHADOW E&M-EST. PATIENT-LVL III: ICD-10-PCS | Mod: PBBFAC,,, | Performed by: FAMILY MEDICINE

## 2019-02-04 PROCEDURE — 99999 PR PBB SHADOW E&M-EST. PATIENT-LVL III: CPT | Mod: PBBFAC,,, | Performed by: FAMILY MEDICINE

## 2019-02-04 PROCEDURE — 99213 OFFICE O/P EST LOW 20 MIN: CPT | Mod: S$PBB,,, | Performed by: FAMILY MEDICINE

## 2019-02-04 PROCEDURE — 99213 PR OFFICE/OUTPT VISIT, EST, LEVL III, 20-29 MIN: ICD-10-PCS | Mod: S$PBB,,, | Performed by: FAMILY MEDICINE

## 2019-02-04 RX ORDER — CLONAZEPAM 0.5 MG/1
0.5 TABLET ORAL 2 TIMES DAILY PRN
Qty: 60 TABLET | Refills: 1 | Status: SHIPPED | OUTPATIENT
Start: 2019-02-04 | End: 2019-02-04 | Stop reason: SDUPTHER

## 2019-02-04 RX ORDER — ROPINIROLE 0.5 MG/1
0.5 TABLET, FILM COATED ORAL 3 TIMES DAILY
Qty: 90 TABLET | Refills: 1 | Status: SHIPPED | OUTPATIENT
Start: 2019-02-04 | End: 2019-03-29 | Stop reason: SDUPTHER

## 2019-02-04 RX ORDER — CLONAZEPAM 0.5 MG/1
0.5 TABLET ORAL 2 TIMES DAILY PRN
Qty: 60 TABLET | Refills: 1 | Status: SHIPPED | OUTPATIENT
Start: 2019-02-04 | End: 2019-04-04 | Stop reason: SDUPTHER

## 2019-02-04 RX ORDER — ROPINIROLE 0.5 MG/1
0.5 TABLET, FILM COATED ORAL 3 TIMES DAILY
Qty: 90 TABLET | Refills: 1 | Status: SHIPPED | OUTPATIENT
Start: 2019-02-04 | End: 2019-02-04 | Stop reason: SDUPTHER

## 2019-02-04 RX ORDER — ROPINIROLE 0.5 MG/1
0.5 TABLET, FILM COATED ORAL 3 TIMES DAILY
Qty: 90 TABLET | Refills: 1 | Status: CANCELLED | OUTPATIENT
Start: 2019-02-04 | End: 2020-02-04

## 2019-02-04 NOTE — PROGRESS NOTES
Subjective:       Patient ID: Logan Haywood is a 48 y.o. male.    Chief Complaint: No chief complaint on file.    HPI     47 yo M    Presents for medication refill.  Taking Klonopin  Feels he needs it.  Feels it helps him relax when gets tense.  Helped with sleep.      Pain still present  Learning to live with it  Appears to be stable    Does note some memory deficits.      Denies any illicit drug use  Determined to keep clean    Review of Systems   Constitutional: Negative for activity change and unexpected weight change.   HENT: Positive for rhinorrhea and trouble swallowing. Negative for hearing loss.    Eyes: Negative for discharge and visual disturbance.   Respiratory: Negative for chest tightness and wheezing.    Cardiovascular: Negative for chest pain and palpitations.   Gastrointestinal: Positive for constipation and diarrhea. Negative for blood in stool and vomiting.   Endocrine: Positive for polydipsia. Negative for polyuria.   Genitourinary: Negative for difficulty urinating, hematuria and urgency.   Musculoskeletal: Positive for arthralgias. Negative for joint swelling and neck pain.   Neurological: Positive for headaches. Negative for weakness.   Psychiatric/Behavioral: Positive for dysphoric mood. Negative for confusion.       Objective:       Vitals:    02/04/19 1058   BP:    Pulse: 100   Resp:    Temp:      Vitals:    02/04/19 1058   BP:    Pulse: 100   Resp:    Temp:          Physical Exam   Constitutional: He is oriented to person, place, and time. He appears well-developed and well-nourished. No distress.   Patient appears comfortable, and in no distress.  Appears better today than I can remember ever seeing him.  Pleasant mood.   HENT:   Head: Normocephalic and atraumatic.   Right Ear: Hearing, tympanic membrane, external ear and ear canal normal.   Left Ear: Hearing, tympanic membrane, external ear and ear canal normal.   Nose: Nose normal. Right sinus exhibits no maxillary sinus  tenderness and no frontal sinus tenderness. Left sinus exhibits no maxillary sinus tenderness and no frontal sinus tenderness.   Mouth/Throat: Uvula is midline, oropharynx is clear and moist and mucous membranes are normal.   Eyes: Conjunctivae are normal. Right eye exhibits no discharge. Left eye exhibits no discharge.   Neck: Trachea normal, normal range of motion and full passive range of motion without pain.   Cardiovascular: Normal rate, regular rhythm, normal heart sounds and intact distal pulses.   Pulmonary/Chest: Effort normal and breath sounds normal. No respiratory distress. He has no decreased breath sounds. He has no wheezes.   Abdominal: Soft. Normal appearance and bowel sounds are normal. He exhibits no distension and no mass. There is no tenderness. There is no guarding. No hernia.   Musculoskeletal: Normal range of motion. He exhibits no edema or deformity.   Lymphadenopathy:     He has no cervical adenopathy.   Neurological: He is alert and oriented to person, place, and time.   Skin: Skin is warm, dry and intact. No rash noted. No erythema. No pallor.   Psychiatric: He has a normal mood and affect. His speech is normal and behavior is normal. Thought content normal.       Assessment:       1. MS (multiple sclerosis)    2. Restless leg    3. Anxiety        Plan:   MS (multiple sclerosis)  -     Discontinue: clonazePAM (KLONOPIN) 0.5 MG tablet; Take 1 tablet (0.5 mg total) by mouth 2 (two) times daily as needed for Anxiety.  Dispense: 60 tablet; Refill: 1  -     clonazePAM (KLONOPIN) 0.5 MG tablet; Take 1 tablet (0.5 mg total) by mouth 2 (two) times daily as needed for Anxiety.  Dispense: 60 tablet; Refill: 1    Restless leg  -     Discontinue: rOPINIRole (REQUIP) 0.5 MG tablet; Take 1 tablet (0.5 mg total) by mouth 3 (three) times daily.  Dispense: 90 tablet; Refill: 1  -     rOPINIRole (REQUIP) 0.5 MG tablet; Take 1 tablet (0.5 mg total) by mouth 3 (three) times daily.  Dispense: 90 tablet;  Refill: 1    Anxiety  -     Discontinue: clonazePAM (KLONOPIN) 0.5 MG tablet; Take 1 tablet (0.5 mg total) by mouth 2 (two) times daily as needed for Anxiety.  Dispense: 60 tablet; Refill: 1  -     clonazePAM (KLONOPIN) 0.5 MG tablet; Take 1 tablet (0.5 mg total) by mouth 2 (two) times daily as needed for Anxiety.  Dispense: 60 tablet; Refill: 1    f/u in 2 months for klonopin refill  Discussed I do not want him to use forever. Encouraged him to learn about long term use of Benzo - discuss risk of such.  Reviewed recent UDS.  Will check UDS periodically while on Klonopin.  This is hopefully short term  Will do labs at f/u visit - noted slight ALT elvation    No Follow-up on file.

## 2019-02-04 NOTE — TELEPHONE ENCOUNTER
----- Message from Em Duran sent at 2/4/2019  4:15 PM CST -----  Contact: self [phone]  States that pharm has not received rx for ropinirole. States that pharm sent over a fax requesting authorization. Pt uses     [id number] - [place] - [address] AT [facility] [address]  [address]  [facility] [address]  Phone: [phone] Fax: [phone]    Please call back at [phone]//thank you acc

## 2019-02-05 ENCOUNTER — TELEPHONE (OUTPATIENT)
Dept: INTERNAL MEDICINE | Facility: CLINIC | Age: 49
End: 2019-02-05

## 2019-02-05 NOTE — TELEPHONE ENCOUNTER
DOCUMENTATION ONLY  The prior authorization request for Glatiramer 40mg/mL was denied by the patient's insurance.   Forwarded to the clinical pharmacist for review. 02/08 4:03pm SCAR             DOCUMENTATION ONLY  Submitted prior authorization request for Glatiramer to insurance on 02/05 12:23pm. SCAR

## 2019-02-05 NOTE — TELEPHONE ENCOUNTER
Informed the patient that the medication was filled at another pharmacy on 1/28/19 and cannot get another fill until 2/28/19.  Patient states that his last fill was in December.  I advised the patient to call the pharmacy to get more information. /fabien/

## 2019-02-08 DIAGNOSIS — G35 MULTIPLE SCLEROSIS: Primary | ICD-10-CM

## 2019-02-08 NOTE — TELEPHONE ENCOUNTER
Glatiramer 40mg/ml three times weekly PA denied, plan prefers glatiramer 20mg/ml injected daily. [facility] will inform patient and proceed with Appeal

## 2019-02-08 NOTE — TELEPHONE ENCOUNTER
----- Message from Evan Munoz sent at 2/8/2019 11:16 AM CST -----  Contact: Patient @ [phone]  Patient calling to get an update on the medication approval for (glatiramer (COPAXONE) 40 mg/mL Syrg injection ) , pls call to advise

## 2019-02-18 ENCOUNTER — CLINICAL SUPPORT (OUTPATIENT)
Dept: REHABILITATION | Facility: HOSPITAL | Age: 49
End: 2019-02-18
Payer: MEDICAID

## 2019-02-18 DIAGNOSIS — G35 MS (MULTIPLE SCLEROSIS): Primary | ICD-10-CM

## 2019-02-18 DIAGNOSIS — R26.9 ABNORMALITY OF GAIT: ICD-10-CM

## 2019-02-18 PROCEDURE — 97161 PT EVAL LOW COMPLEX 20 MIN: CPT | Performed by: PHYSICAL THERAPIST

## 2019-02-18 NOTE — PROGRESS NOTES
PHYSICAL THERAPY INITIAL OUTPATIENT EVALUATION    Referring Provider:  Dr. Maria Teresa Mills    Diagnosis:         ICD-10-CM ICD-9-CM    1. MS (multiple sclerosis) G35 340    2. Abnormality of gait R26.9 781.2        Orders:  Evaluate and Treat    Date of Initial Evaluation: 2/18/19    Visit # 1    SUBJECTIVE: Patient reports dx of MS 1 year ago. He reports slowly declining physical health. He reports his balance and walking is slowly regressing and also complains of generalized fatigue and low back pain with prolonged sitting or standing greater than 5 minutes.  His goals are to improve his blance so he doesn't fall at home or in the community. He would also like to improve his endurance so he can go grocery shopping without severe fatigue    OBJECTIVE:  Gait: Pt ambulates without AD with staggering gait     Neuro/Sensation: NT     ROM:  WNL    Strength:    Strength Right Left   Hip Flexion 2 2   Knee Extension 3- 3-   Ankle DF 3- 3-   Ankle PF 2 2   Knee Flexors 3 3       Balance:    Salas Balance Assessment        1.Sitting to Standing       4   2.Standing Unsupported      3   3.Sitting with Back Unsupported     4   4.Standing to Sitting       4   5.Transfers        4   6.Standing Unsupported with Eyes Closed    3   7.Standing Unsupported with Feet Together    3   8.Reaching Forward with Outstretched Arm while Standing  3   9. Object from the Floor from a Standing Position  0   10.Turning to Look Behind Shoulder while Standing   2   11.Turn 360 Degrees       2   12.Placing Alternate Foot on Step while Standing Unsupported 2   13.Standing Unsupported One Foot in Front    2   14.Standing on One Leg      1             Total 37/56= 66%            Risk moderate       Other: core mm 3-/5    ASSESSMENT:  The patient is a 48 y.o. year old male referred to physical therapy with complaints of decreased balance and fear of falling.  Patient presents with the following objective findings: moderate risk of fall on SALAS  balance test and decreased core and BLE strength.  These impairments are limiting patient's ability to perform ADL and walking without LOB and risk of falling.  Patient's prognosis is good for stated goals.  Patient will benefit from skilled physical therapy intervention to address impairments and reduce risk of fall    Short Term Goals:  1-4 week  1.Pt to score greater than 47/56 on SALAS balance to reduce risk of falls  2.Pt to increased trunk and BLE strength 1/2 grade tot reduce risk of falls    Long Term Goals: 6-8 weeks  1.Pt to score 56/56 on SALAS balance to reduce risk of falls to low  2.Pt to demo good core and BLE to reduce risk of falls to low and improve gait  3. Pt to report minimal fatigue after grocery shopping  TREATMENT PROVIDED:  -Evaluation:  -Therapeutic Exercise:  -Education/HEP:    PLAN:  Patient will benefit from physical therapy (1-2) x/week for (6-8) weeks including manual therapy, therapeutic exercise, therapeutic/functional activities, neuromuscular re-education, gait training, modalities, and patient education.    Thank you for this referral.    These services are reasonable and necessary for the conditions set forth above while under my care.    Renetta Torre, PT, DPT, MTC, OCS  Board certified Orthopaedic Specialist  Department of Physical Therapy  [phone]

## 2019-02-25 RX ORDER — GLATIRAMER ACETATE 20 MG/ML
20 INJECTION, SOLUTION SUBCUTANEOUS DAILY
Qty: 30 SYRINGE | Refills: 5 | Status: CANCELLED | OUTPATIENT
Start: 2019-02-25

## 2019-02-25 NOTE — TELEPHONE ENCOUNTER
----- Message from Yoselyn Sanchez sent at 2/25/2019 12:06 PM CST -----  Contact: Pt   Pt is requesting a call back in regards to calling in his glatiramer (COPAXONE) 40 mg/mL Syrg injection.      Pt can be contacted at [phone]

## 2019-02-25 NOTE — TELEPHONE ENCOUNTER
Received another call from the [employer] nurse reviewer requesting to do a peer to peer. Peer to Peer completed with Nurse reviewer Ligia.  Ligia states we will have a decision within 72 hours.

## 2019-02-25 NOTE — TELEPHONE ENCOUNTER
Returned call and notified pt [employer] upheld the glatiramer 40 mg three times weekly appeal. Pt open to trying glatiramer 20 mg SQ daily.

## 2019-03-01 ENCOUNTER — PATIENT MESSAGE (OUTPATIENT)
Dept: NEUROLOGY | Facility: CLINIC | Age: 49
End: 2019-03-01

## 2019-03-01 DIAGNOSIS — G35 MULTIPLE SCLEROSIS: Primary | ICD-10-CM

## 2019-03-04 ENCOUNTER — CLINICAL SUPPORT (OUTPATIENT)
Dept: REHABILITATION | Facility: HOSPITAL | Age: 49
End: 2019-03-04
Payer: MEDICAID

## 2019-03-04 ENCOUNTER — TELEPHONE (OUTPATIENT)
Dept: NEUROLOGY | Facility: CLINIC | Age: 49
End: 2019-03-04

## 2019-03-04 DIAGNOSIS — R26.9 ABNORMALITY OF GAIT: ICD-10-CM

## 2019-03-04 DIAGNOSIS — G35 MS (MULTIPLE SCLEROSIS): Primary | ICD-10-CM

## 2019-03-04 PROCEDURE — 97110 THERAPEUTIC EXERCISES: CPT

## 2019-03-04 NOTE — TELEPHONE ENCOUNTER
----- Message from Yvonne Valdez sent at 3/4/2019  1:46 PM CST -----  Contact: self @ [phone] after 4:00 because he is leaving for physical therapy  Pt says he is suppose to be getting a prescription for copaxone but has not heard from anyone yet.  Pls call.

## 2019-03-04 NOTE — PROGRESS NOTES
PHYSICAL THERAPY INITIAL OUTPATIENT EVALUATION    Referring Provider:  Dr. Maria Teresa Mills    Diagnosis:         ICD-10-CM ICD-9-CM    1. MS (multiple sclerosis) G35 340    2. Abnormality of gait R26.9 781.2        Orders:  Evaluate and Treat    Date of Initial Evaluation: 2/18/19    Visit # 2    SUBJECTIVE: Patient reports dx of MS 1 year ago. He reports slowly declining physical health. He reports his balance and walking is slowly regressing and also complains of generalized fatigue and low back pain with prolonged sitting or standing greater than 5 minutes.  His goals are to improve his blance so he doesn't fall at home or in the community. He would also like to improve his endurance so he can go grocery shopping without severe fatigue    Patient reports left leg pain and weakness. Pain is worse when he walks. He also reports numbness in both hands. He required multiple rest breaks during the session due to muscle fatigue.    OBJECTIVE:  Gait: Pt ambulates without AD with staggering gait     Neuro/Sensation: NT     ROM:  WNL    Strength:    Strength Right Left   Hip Flexion 2 2   Knee Extension 3- 3-   Ankle DF 3- 3-   Ankle PF 2 2   Knee Flexors 3 3       Balance:    Razo Balance Assessment        1.Sitting to Standing       4   2.Standing Unsupported      3   3.Sitting with Back Unsupported     4   4.Standing to Sitting       4   5.Transfers        4   6.Standing Unsupported with Eyes Closed    3   7.Standing Unsupported with Feet Together    3   8.Reaching Forward with Outstretched Arm while Standing  3   9. Object from the Floor from a Standing Position  0   10.Turning to Look Behind Shoulder while Standing   2   11.Turn 360 Degrees       2   12.Placing Alternate Foot on Step while Standing Unsupported 2   13.Standing Unsupported One Foot in Front    2   14.Standing on One Leg      1             Total 37/56= 66%            Risk moderate       Other: core mm 3-/5    ASSESSMENT:  The patient is a 48  "y.o. year old male referred to physical therapy with complaints of decreased balance and fear of falling.  Patient presents with the following objective findings: moderate risk of fall on SALAS balance test and decreased core and BLE strength.  These impairments are limiting patient's ability to perform ADL and walking without LOB and risk of falling.  Patient's prognosis is good for stated goals.  Patient will benefit from skilled physical therapy intervention to address impairments and reduce risk of fall    Short Term Goals:  1-4 week  1.Pt to score greater than 47/56 on SALAS balance to reduce risk of falls  2.Pt to increased trunk and BLE strength 1/2 grade tot reduce risk of falls    Long Term Goals: 6-8 weeks  1.Pt to score 56/56 on SALAS balance to reduce risk of falls to low  2.Pt to demo good core and BLE to reduce risk of falls to low and improve gait  3. Pt to report minimal fatigue after grocery shopping    TREATMENT PROVIDED:  -Evaluation:----  -Therapeutic Exercise: (40 minutes)  -SLR 2x10 BLE  -supine abduction/adduction 2x10 BLE  -bridging 10 x 5" holds  -clamshell in SL 2x10  -LAQs 5 x 10" holds  -BALANCE: static standing with feet together on level surface + head movements left and right (minimal assist needed at times)  -Education/HEP: home program provided    PLAN:  Patient will benefit from physical therapy (1-2) x/week to include manual therapy, therapeutic exercise, therapeutic/functional activities, neuromuscular re-education, gait training, modalities, and patient education.    Thank you for this referral.    These services are reasonable and necessary for the conditions set forth above while under my care.    Kavita Bill, PT  "

## 2019-03-06 ENCOUNTER — PATIENT MESSAGE (OUTPATIENT)
Dept: NEUROLOGY | Facility: CLINIC | Age: 49
End: 2019-03-06

## 2019-03-06 NOTE — TELEPHONE ENCOUNTER
Pt notified [facility] has denied his Copaxone 40 mg and that Dr. Mills has been sent to 20 mg daily to send to [facility].

## 2019-03-07 RX ORDER — GLATIRAMER ACETATE 20 MG/ML
20 INJECTION, SOLUTION SUBCUTANEOUS DAILY
Qty: 30 SYRINGE | Refills: 5 | Status: SHIPPED | OUTPATIENT
Start: 2019-03-07 | End: 2019-09-11

## 2019-03-12 ENCOUNTER — TELEPHONE (OUTPATIENT)
Dept: NEUROLOGY | Facility: CLINIC | Age: 49
End: 2019-03-12

## 2019-03-12 ENCOUNTER — CLINICAL SUPPORT (OUTPATIENT)
Dept: REHABILITATION | Facility: HOSPITAL | Age: 49
End: 2019-03-12
Payer: MEDICAID

## 2019-03-12 DIAGNOSIS — R26.9 ABNORMALITY OF GAIT: ICD-10-CM

## 2019-03-12 DIAGNOSIS — G35 MS (MULTIPLE SCLEROSIS): Primary | ICD-10-CM

## 2019-03-12 PROCEDURE — 97110 THERAPEUTIC EXERCISES: CPT

## 2019-03-12 NOTE — PROGRESS NOTES
PHYSICAL THERAPY PROGRESS NOTE:    Referring Provider:  Dr. Maria Teresa Mills    Diagnosis:         ICD-10-CM ICD-9-CM    1. MS (multiple sclerosis) G35 340    2. Abnormality of gait R26.9 781.2        Orders:  Evaluate and Treat    Date of Initial Evaluation: 2/18/19    Visit # 2    SUBJECTIVE: Patient reports dx of MS 1 year ago. He reports slowly declining physical health. He reports his balance and walking is slowly regressing and also complains of generalized fatigue and low back pain with prolonged sitting or standing greater than 5 minutes.  His goals are to improve his blance so he doesn't fall at home or in the community. He would also like to improve his endurance so he can go grocery shopping without severe fatigue    Patient reports left leg pain and weakness. He was able to perform 3 sets of each exercise without an increase in pain. He did report lower extremity fatigue after exercises.    OBJECTIVE:  Gait: Pt ambulates without AD with staggering gait     Neuro/Sensation: NT     ROM:  WNL    Strength:    Strength Right Left   Hip Flexion 2 2   Knee Extension 3- 3-   Ankle DF 3- 3-   Ankle PF 2 2   Knee Flexors 3 3       Balance:    Razo Balance Assessment        1.Sitting to Standing       4   2.Standing Unsupported      3   3.Sitting with Back Unsupported     4   4.Standing to Sitting       4   5.Transfers        4   6.Standing Unsupported with Eyes Closed    3   7.Standing Unsupported with Feet Together    3   8.Reaching Forward with Outstretched Arm while Standing  3   9. Object from the Floor from a Standing Position  0   10.Turning to Look Behind Shoulder while Standing   2   11.Turn 360 Degrees       2   12.Placing Alternate Foot on Step while Standing Unsupported 2   13.Standing Unsupported One Foot in Front    2   14.Standing on One Leg      1             Total 37/56= 66%            Risk moderate       Other: core mm 3-/5    ASSESSMENT:  The patient is a 48 y.o. year old male referred to  "physical therapy with complaints of decreased balance and fear of falling.  Patient presents with the following objective findings: moderate risk of fall on SALAS balance test and decreased core and BLE strength.  These impairments are limiting patient's ability to perform ADL and walking without LOB and risk of falling.  Patient's prognosis is good for stated goals.  Patient will benefit from skilled physical therapy intervention to address impairments and reduce risk of fall    Short Term Goals:  1-4 week  1.Pt to score greater than 47/56 on SALAS balance to reduce risk of falls  2.Pt to increased trunk and BLE strength 1/2 grade tot reduce risk of falls    Long Term Goals: 6-8 weeks  1.Pt to score 56/56 on SALAS balance to reduce risk of falls to low  2.Pt to demo good core and BLE to reduce risk of falls to low and improve gait  3. Pt to report minimal fatigue after grocery shopping    TREATMENT PROVIDED:  -Evaluation:----  -Therapeutic Exercise: (45 minutes)  -SLR 3x10 BLE  -bridging 10 x 5" holds  -clamshell and abduction in SL 3x10  -Matrix leg extension 10# 3x10  -BALANCE: --------  -Education/HEP: home program provided    PLAN:  Patient will benefit from physical therapy (1-2) x/week to include balance exercises,  therapeutic exercise, therapeutic/functional activities, neuromuscular re-education, gait training, modalities, and patient education.    Thank you for this referral.    These services are reasonable and necessary for the conditions set forth above while under my care.    Kavita Bill, PT  "

## 2019-03-12 NOTE — TELEPHONE ENCOUNTER
----- Message from Yvonne Valdez sent at 3/12/2019  1:04 PM CDT -----  Contact: Natty Dowell    [phone]  pts prior auth for glatiramer (COPAXONE) 20 mg/mL Syrg is being denied.  Calling to see if we can substitute with a formulary medication, Avonex or Tecfidera.

## 2019-03-12 NOTE — TELEPHONE ENCOUNTER
Returned call, completed PA for glatiramer 20 mg daily. PA approved from 3/12/19-3/11/20, auth # [id number].

## 2019-03-12 NOTE — TELEPHONE ENCOUNTER
----- Message from Linda Cárdenas sent at 3/12/2019 12:32 PM CDT -----  Contact: pt at [phone]  Needs Advice    Reason for call:Dara-  Has his MS medicaine been approved.  Has been waiting 2-3 months now.  Med is Copaxone?        Communication Preference:call with update please    Additional Information:

## 2019-03-13 ENCOUNTER — TELEPHONE (OUTPATIENT)
Dept: PHARMACY | Facility: CLINIC | Age: 49
End: 2019-03-13

## 2019-03-13 NOTE — TELEPHONE ENCOUNTER
Notified via fax from [employer] that glatiramer 20 mg daily is approved from 3/12/19-3/11/2020, Auth # [id number]. Pharmacy aware of approval. They have run the claim and will contact the pt today to set up consultation and delivery.

## 2019-03-13 NOTE — TELEPHONE ENCOUNTER
Initial Glatiramer 20mg consult completed on 3/13. Glatiramer  will be shipped on 3/14 to arrive at patient's home on 3/15 via [employer].Ex. $ 3.00 copay. Patient intends to start Glatiramer on 3/15.  Provided [facility] Nurse Educator number: [phone]. Address confirmed. Confirmed 2 patient identifiers - name and . Therapy Appropriate.    --Injection experience: no    Counseled patient on administration directions:   Inject Glatiramer into the skin once daily   A partial dose should never be repeated.    Take out of the refrigerator 30-60 minutes prior to injection.   Wash hands before and after injection.   Monthly RX will come with gauze, Band-Aids, and alcohol swabs.   Patient may inject in either the tops of thighs or abdomen- but at least 2 inches away from the belly button, upper hips, but always below the waist, or the outer or back part of his/ her upper arm.   Patient is to wipe down the injection site with the alcohol pad, wait to dry.  Pinch about a 2 inch fold of skin between the thumb and index finger, insert the needle at a 90 degree angle straight into the skin.  Hold the syringe steady and slowly push down the plunger, then remove the needle.    Patient will use sharps container; once full, per [place] law, he/she may lock the sharps container and place in the trash. He/ She can then contact the Pharmacy and we will replace the sharps at no additional charge.    Patient was counseled on possible side effects:  · Injection site reaction: redness, soreness, itching, bruising, lipoatrophy, swelling, lumps, pain and rash  · Flushing, chest pain, palpitations, anxiety, dyspnea, constriction of the throat, and urticaria. These symptoms are generally transient and self-limited and do not require specific treatment.   · may occur early or may have their onset several months after the initiation of treatment  · Infection, weakness, upset stomach, flushing, back pain  · Nausea and vomiting  · ER precautions  advised for signs and symptoms of allergic reaction      Patient was advised to keep a calendar or download an injection kell to stay compliant. Consultation included: indication; goals of treatment; administration; storage and handling; side effects; how to handle side effects; the importance of compliance; the importance of laboratory monitoring; the importance of keeping all follow up appointments.  Patient understands to report any medication changes to [facility] and provider. All questions answered and addressed to patients satisfaction. I will f/u with her in 1 week from start, [facility] to contact patient in 3 weeks for refills.

## 2019-03-13 NOTE — TELEPHONE ENCOUNTER
DOCUMENTATION ONLY:  Prior Authorization for Glatiramer 20mg approved from 03/12/2019 to 03/11/2020    Case Id: [id number]    Co-pay: $3.00    Patient Assistance IS NOT required  SCAR

## 2019-03-18 ENCOUNTER — CLINICAL SUPPORT (OUTPATIENT)
Dept: REHABILITATION | Facility: HOSPITAL | Age: 49
End: 2019-03-18
Payer: MEDICAID

## 2019-03-18 DIAGNOSIS — G35 MS (MULTIPLE SCLEROSIS): Primary | ICD-10-CM

## 2019-03-18 PROCEDURE — 97110 THERAPEUTIC EXERCISES: CPT

## 2019-03-18 NOTE — PROGRESS NOTES
PHYSICAL THERAPY PROGRESS NOTE:    Referring Provider:  Dr. Maria Teresa Mills    Diagnosis:         ICD-10-CM ICD-9-CM    1. MS (multiple sclerosis) G35 340        Orders:  Evaluate and Treat    Date of Initial Evaluation: 2/18/19    Visit # 3    SUBJECTIVE: Patient reports dx of MS 1 year ago. He reports slowly declining physical health. He reports his balance and walking is slowly regressing and also complains of generalized fatigue and low back pain with prolonged sitting or standing greater than 5 minutes.  His goals are to improve his blance so he doesn't fall at home or in the community. He would also like to improve his endurance so he can go grocery shopping without severe fatigue    Patient reports left leg fatigue and general body fatigue before therapy. He tolerated all exercises and balance activities with multiple rest breaks    OBJECTIVE:  Gait: Pt ambulates without AD with staggering gait     Neuro/Sensation: NT     ROM:  WNL    Strength:    Strength Right Left   Hip Flexion 2 2   Knee Extension 3- 3-   Ankle DF 3- 3-   Ankle PF 2 2   Knee Flexors 3 3       Balance:    Razo Balance Assessment        1.Sitting to Standing       4   2.Standing Unsupported      3   3.Sitting with Back Unsupported     4   4.Standing to Sitting       4   5.Transfers        4   6.Standing Unsupported with Eyes Closed    3   7.Standing Unsupported with Feet Together    3   8.Reaching Forward with Outstretched Arm while Standing  3   9. Object from the Floor from a Standing Position  0   10.Turning to Look Behind Shoulder while Standing   2   11.Turn 360 Degrees       2   12.Placing Alternate Foot on Step while Standing Unsupported 2   13.Standing Unsupported One Foot in Front    2   14.Standing on One Leg      1             Total 37/56= 66%            Risk moderate       Other: core mm 3-/5    ASSESSMENT:  The patient is a 48 y.o. year old male referred to physical therapy with complaints of decreased balance and fear  "of falling.  Patient presents with the following objective findings: moderate risk of fall on SALAS balance test and decreased core and BLE strength.  These impairments are limiting patient's ability to perform ADL and walking without LOB and risk of falling.  Patient's prognosis is good for stated goals.  Patient will benefit from skilled physical therapy intervention to address impairments and reduce risk of fall    Short Term Goals:  1-4 week  1.Pt to score greater than 47/56 on SALAS balance to reduce risk of falls  2.Pt to increased trunk and BLE strength 1/2 grade tot reduce risk of falls    Long Term Goals: 6-8 weeks  1.Pt to score 56/56 on SALAS balance to reduce risk of falls to low  2.Pt to demo good core and BLE to reduce risk of falls to low and improve gait  3. Pt to report minimal fatigue after grocery shopping    TREATMENT PROVIDED:  -Evaluation:----  -Therapeutic Exercise: (45 minutes)  -SLR 3x10 BLE  -bridging 10 x 5" holds  -abduction in SL 3x10  -Matrix leg extension 10# 3x10  -shuttle for LE endurance x 3 minutes  -recumbent bike x 5 minutes  -BALANCE: tandem stance, tandem walk, high marching without UE support, sit to stand with feet together (without UE) 3x5 reps  -Education/HEP: home program provided    PLAN:  Patient will benefit from physical therapy (1-2) x/week to include balance exercises,  therapeutic exercise, therapeutic/functional activities, neuromuscular re-education, gait training, modalities, and patient education.    Thank you for this referral.    These services are reasonable and necessary for the conditions set forth above while under my care.    Kavita Bill, PT  "

## 2019-03-19 NOTE — PROGRESS NOTES
PHYSICAL THERAPY PROGRESS NOTE:    Referring Provider:  Dr. Maria Teresa Mills    Diagnosis:         ICD-10-CM ICD-9-CM    1. MS (multiple sclerosis) G35 340        Orders:  Evaluate and Treat    Date of Initial Evaluation: 2/18/19    Visit # 5    SUBJECTIVE: Patient reports dx of MS 1 year ago. He reports slowly declining physical health. He reports his balance and walking is slowly regressing and also complains of generalized fatigue and low back pain with prolonged sitting or standing greater than 5 minutes.  His goals are to improve his blance so he doesn't fall at home or in the community. He would also like to improve his endurance so he can go grocery shopping without severe fatigue    Patient reports left leg fatigue and general body fatigue before therapy. He tolerated all exercises and balance activities with multiple rest breaks. He was able to improve gait pattern today, but once he fatigued, he had difficulty with the LLE    OBJECTIVE:  Gait: Pt ambulates without AD with staggering gait     Neuro/Sensation: NT     ROM:  WNL    Strength:    Strength Right Left   Hip Flexion 2 2   Knee Extension 3- 3-   Ankle DF 3- 3-   Ankle PF 2 2   Knee Flexors 3 3       Balance:    Razo Balance Assessment        1.Sitting to Standing       4   2.Standing Unsupported      3   3.Sitting with Back Unsupported     4   4.Standing to Sitting       4   5.Transfers        4   6.Standing Unsupported with Eyes Closed    3   7.Standing Unsupported with Feet Together    3   8.Reaching Forward with Outstretched Arm while Standing  3   9. Object from the Floor from a Standing Position  0   10.Turning to Look Behind Shoulder while Standing   2   11.Turn 360 Degrees       2   12.Placing Alternate Foot on Step while Standing Unsupported 2   13.Standing Unsupported One Foot in Front    2   14.Standing on One Leg      1             Total 37/56= 66%            Risk moderate       Other: core mm 3-/5    ASSESSMENT:  The patient is a  "48 y.o. year old male referred to physical therapy with complaints of decreased balance and fear of falling.  Patient presents with the following objective findings: moderate risk of fall on SALAS balance test and decreased core and BLE strength.  These impairments are limiting patient's ability to perform ADL and walking without LOB and risk of falling.  Patient's prognosis is good for stated goals.  Patient will benefit from skilled physical therapy intervention to address impairments and reduce risk of fall    Patient improved with overall balance and muscle endurance today. He was able to correct gait deficits LLE until his leg fatigued.    Short Term Goals:  1-4 week  1.Pt to score greater than 47/56 on SALAS balance to reduce risk of falls  2.Pt to increased trunk and BLE strength 1/2 grade tot reduce risk of falls    Long Term Goals: 6-8 weeks  1.Pt to score 56/56 on SALAS balance to reduce risk of falls to low  2.Pt to demo good core and BLE to reduce risk of falls to low and improve gait  3. Pt to report minimal fatigue after grocery shopping    TREATMENT PROVIDED:  -Evaluation:----  -Therapeutic Exercise: (55 minutes)  -SLR 3x10 BLE  -bridging 10 x 5" holds  -abduction in SL 3x10  -Matrix leg extension 15# 3x10  -recumbent bike x 10 minutes to increase muscle endurance LLE  -BALANCE: tandem stance, tandem walk, high marching without UE support, sit to stand with feet together on a balance pad (without UE) x 10 reps, bosu ball balance activities performed (forwards/backwards, side to side, circles) with minimal UE support needed, ascend/descend 4 steps x 5 (20 steps) without UE support with cues to control descent  -Gait training: no device, emphasized left heel strike, swing through and floor clearance.    -Education/HEP: home program provided    PLAN:  Patient will benefit from physical therapy (1-2) x/week to include balance exercises,  therapeutic exercise, therapeutic/functional activities, neuromuscular " re-education, gait training, modalities, and patient education.    Thank you for this referral.    These services are reasonable and necessary for the conditions set forth above while under my care.    Kavita Bill, PT

## 2019-03-20 ENCOUNTER — PATIENT MESSAGE (OUTPATIENT)
Dept: NEUROLOGY | Facility: CLINIC | Age: 49
End: 2019-03-20

## 2019-03-20 ENCOUNTER — TELEPHONE (OUTPATIENT)
Dept: NEUROLOGY | Facility: CLINIC | Age: 49
End: 2019-03-20

## 2019-03-20 DIAGNOSIS — R53.82 CHRONIC FATIGUE: Primary | ICD-10-CM

## 2019-03-20 DIAGNOSIS — R40.0 HAS DAYTIME DROWSINESS: ICD-10-CM

## 2019-03-20 NOTE — TELEPHONE ENCOUNTER
Call attempted to pt. Pt's mother answered and states pt is not home and does not have a cell phone. Advised her to let pt know this office will send him an email.

## 2019-03-20 NOTE — TELEPHONE ENCOUNTER
----- Message from Evan Munoz sent at 3/20/2019  3:25 PM CDT -----  Contact: Patient @ [phone]  Patient returning a missed call, pls call

## 2019-03-21 NOTE — TELEPHONE ENCOUNTER
Call returned to pt. Advised that he is to take 20mg dosage daily that it is not time release like the 40mg dosage.     Pt also mentioned that fatigue is still severe and amantadine is not helping.

## 2019-03-22 ENCOUNTER — CLINICAL SUPPORT (OUTPATIENT)
Dept: REHABILITATION | Facility: HOSPITAL | Age: 49
End: 2019-03-22
Payer: MEDICAID

## 2019-03-22 DIAGNOSIS — G35 MS (MULTIPLE SCLEROSIS): Primary | ICD-10-CM

## 2019-03-22 PROCEDURE — 97110 THERAPEUTIC EXERCISES: CPT

## 2019-03-25 NOTE — TELEPHONE ENCOUNTER
Call placed to pt. Advised that Dr Mills will submit for Provigil to his pharmacy. Also advised that it will likely need PA. If denied, we may send for Nuvigil. Discussed side effects. Verbalizes understanding and would like to move forward.

## 2019-03-27 RX ORDER — MODAFINIL 100 MG/1
100 TABLET ORAL DAILY
Qty: 30 TABLET | Refills: 5 | Status: SHIPPED | OUTPATIENT
Start: 2019-03-27 | End: 2019-04-26

## 2019-03-29 DIAGNOSIS — G25.81 RESTLESS LEG: ICD-10-CM

## 2019-03-29 RX ORDER — ROPINIROLE 0.5 MG/1
TABLET, FILM COATED ORAL
Qty: 90 TABLET | Refills: 0 | Status: SHIPPED | OUTPATIENT
Start: 2019-03-29 | End: 2019-04-29 | Stop reason: SDUPTHER

## 2019-03-29 NOTE — TELEPHONE ENCOUNTER
Fax received from [employer]. Modafinil PA denied due to cannot approve off-label use of this medication. Can call for peer to peer review at [phone].

## 2019-04-02 ENCOUNTER — TELEPHONE (OUTPATIENT)
Dept: NEUROLOGY | Facility: CLINIC | Age: 49
End: 2019-04-02

## 2019-04-02 NOTE — TELEPHONE ENCOUNTER
----- Message from Yoselyn Sanchez sent at 4/2/2019  1:22 PM CDT -----  Contact: [facility] ULLOA is requesting a call back in regards to clinical information modafinil (PROVIGIL) 100 MG Tab request.     Fax # [phone]    [facility] ULLOA can be contacted at [phone]

## 2019-04-03 DIAGNOSIS — G35 MS (MULTIPLE SCLEROSIS): ICD-10-CM

## 2019-04-03 DIAGNOSIS — F41.9 ANXIETY: ICD-10-CM

## 2019-04-03 NOTE — TELEPHONE ENCOUNTER
Received call from [facility] who requested most recent clinicals be faxed over. She said if the reviewer is unable to make a determination based of the clinicals, they will call back to set up the peer to peer. Faxed clinicals dated 1/24/19 to [facility] at [phone].

## 2019-04-04 ENCOUNTER — OFFICE VISIT (OUTPATIENT)
Dept: INTERNAL MEDICINE | Facility: CLINIC | Age: 49
End: 2019-04-04
Payer: MEDICAID

## 2019-04-04 ENCOUNTER — LAB VISIT (OUTPATIENT)
Dept: LAB | Facility: HOSPITAL | Age: 49
End: 2019-04-04
Attending: FAMILY MEDICINE
Payer: MEDICAID

## 2019-04-04 VITALS
BODY MASS INDEX: 29.52 KG/M2 | DIASTOLIC BLOOD PRESSURE: 72 MMHG | HEART RATE: 96 BPM | RESPIRATION RATE: 18 BRPM | SYSTOLIC BLOOD PRESSURE: 108 MMHG | OXYGEN SATURATION: 96 % | TEMPERATURE: 98 F | WEIGHT: 171.94 LBS

## 2019-04-04 DIAGNOSIS — G35 MS (MULTIPLE SCLEROSIS): ICD-10-CM

## 2019-04-04 DIAGNOSIS — Z79.899 HISTORY OF LONG-TERM TREATMENT WITH HIGH-RISK MEDICATION: ICD-10-CM

## 2019-04-04 DIAGNOSIS — R53.83 FATIGUE, UNSPECIFIED TYPE: ICD-10-CM

## 2019-04-04 DIAGNOSIS — R73.9 ELEVATED BLOOD SUGAR: ICD-10-CM

## 2019-04-04 DIAGNOSIS — G89.29 OTHER CHRONIC PAIN: Primary | ICD-10-CM

## 2019-04-04 DIAGNOSIS — G62.9 NEUROPATHY: ICD-10-CM

## 2019-04-04 DIAGNOSIS — F41.9 ANXIETY: ICD-10-CM

## 2019-04-04 DIAGNOSIS — G25.81 RESTLESS LEG SYNDROME: ICD-10-CM

## 2019-04-04 LAB
25(OH)D3+25(OH)D2 SERPL-MCNC: 43 NG/ML (ref 30–96)
ALBUMIN SERPL BCP-MCNC: 4.2 G/DL (ref 3.5–5.2)
ALP SERPL-CCNC: 93 U/L (ref 55–135)
ALT SERPL W/O P-5'-P-CCNC: 69 U/L (ref 10–44)
AMPHET+METHAMPHET UR QL: NEGATIVE
ANION GAP SERPL CALC-SCNC: 11 MMOL/L (ref 8–16)
AST SERPL-CCNC: 31 U/L (ref 10–40)
BARBITURATES UR QL SCN>200 NG/ML: NEGATIVE
BENZODIAZ UR QL SCN>200 NG/ML: NEGATIVE
BILIRUB SERPL-MCNC: 0.7 MG/DL (ref 0.1–1)
BUN SERPL-MCNC: 7 MG/DL (ref 6–20)
BZE UR QL SCN: NEGATIVE
CALCIUM SERPL-MCNC: 10.3 MG/DL (ref 8.7–10.5)
CANNABINOIDS UR QL SCN: NEGATIVE
CHLORIDE SERPL-SCNC: 104 MMOL/L (ref 95–110)
CO2 SERPL-SCNC: 27 MMOL/L (ref 23–29)
CREAT SERPL-MCNC: 0.8 MG/DL (ref 0.5–1.4)
CREAT UR-MCNC: 45 MG/DL (ref 23–375)
EST. GFR  (AFRICAN AMERICAN): >60 ML/MIN/1.73 M^2
EST. GFR  (NON AFRICAN AMERICAN): >60 ML/MIN/1.73 M^2
ESTIMATED AVG GLUCOSE: 103 MG/DL (ref 68–131)
ETHANOL UR-MCNC: <10 MG/DL
GLUCOSE SERPL-MCNC: 95 MG/DL (ref 70–110)
HBA1C MFR BLD HPLC: 5.2 % (ref 4–5.6)
METHADONE UR QL SCN>300 NG/ML: NEGATIVE
OPIATES UR QL SCN: NEGATIVE
PCP UR QL SCN>25 NG/ML: NEGATIVE
POTASSIUM SERPL-SCNC: 4.8 MMOL/L (ref 3.5–5.1)
PROT SERPL-MCNC: 7.6 G/DL (ref 6–8.4)
SODIUM SERPL-SCNC: 142 MMOL/L (ref 136–145)
TOXICOLOGY INFORMATION: NORMAL
TSH SERPL DL<=0.005 MIU/L-ACNC: 1.93 UIU/ML (ref 0.4–4)

## 2019-04-04 PROCEDURE — 84443 ASSAY THYROID STIM HORMONE: CPT

## 2019-04-04 PROCEDURE — 99214 OFFICE O/P EST MOD 30 MIN: CPT | Mod: PBBFAC | Performed by: FAMILY MEDICINE

## 2019-04-04 PROCEDURE — 99214 PR OFFICE/OUTPT VISIT, EST, LEVL IV, 30-39 MIN: ICD-10-PCS | Mod: S$PBB,,, | Performed by: FAMILY MEDICINE

## 2019-04-04 PROCEDURE — 99999 PR PBB SHADOW E&M-EST. PATIENT-LVL IV: ICD-10-PCS | Mod: PBBFAC,,, | Performed by: FAMILY MEDICINE

## 2019-04-04 PROCEDURE — 82306 VITAMIN D 25 HYDROXY: CPT

## 2019-04-04 PROCEDURE — 83036 HEMOGLOBIN GLYCOSYLATED A1C: CPT

## 2019-04-04 PROCEDURE — 99999 PR PBB SHADOW E&M-EST. PATIENT-LVL IV: CPT | Mod: PBBFAC,,, | Performed by: FAMILY MEDICINE

## 2019-04-04 PROCEDURE — 99214 OFFICE O/P EST MOD 30 MIN: CPT | Mod: S$PBB,,, | Performed by: FAMILY MEDICINE

## 2019-04-04 PROCEDURE — 36415 COLL VENOUS BLD VENIPUNCTURE: CPT

## 2019-04-04 PROCEDURE — 80307 DRUG TEST PRSMV CHEM ANLYZR: CPT

## 2019-04-04 PROCEDURE — 80053 COMPREHEN METABOLIC PANEL: CPT

## 2019-04-04 RX ORDER — CLONAZEPAM 0.5 MG/1
0.5 TABLET ORAL 2 TIMES DAILY PRN
Qty: 60 TABLET | Refills: 1 | Status: SHIPPED | OUTPATIENT
Start: 2019-04-04 | End: 2019-06-04 | Stop reason: ALTCHOICE

## 2019-04-04 RX ORDER — PREGABALIN 25 MG/1
25 CAPSULE ORAL 2 TIMES DAILY
Qty: 60 CAPSULE | Refills: 0 | Status: SHIPPED | OUTPATIENT
Start: 2019-04-04 | End: 2019-05-06 | Stop reason: SDUPTHER

## 2019-04-04 RX ORDER — CLONAZEPAM 0.5 MG/1
TABLET ORAL
Qty: 60 TABLET | Refills: 0 | OUTPATIENT
Start: 2019-04-04

## 2019-04-04 NOTE — TELEPHONE ENCOUNTER
Fax received from [employer]. Modafinil 100mg PA approved from 3/28/19-3/27/20 with request number [id number].

## 2019-04-04 NOTE — PROGRESS NOTES
Subjective:       Patient ID: Logan Haywood is a 48 y.o. male.    Chief Complaint: Follow-up    HPI       49 yo M    New medication for MS - Copaxone  Daily injections  Has noted any changes    Fatigue is still the same.  Feels tpain has gotten worse  Numbness - is all over     Seeks pain management.    Using gabapentin 600 mg TID.  Does not seem to work well.  Lyrica was not approved.    No drug use      Not currently smoking          Review of Systems   Constitutional: Positive for activity change.   Eyes: Negative for discharge.   Respiratory: Negative for wheezing.    Cardiovascular: Negative for chest pain and palpitations.   Gastrointestinal: Positive for constipation. Negative for diarrhea and vomiting.   Genitourinary: Positive for difficulty urinating. Negative for hematuria.   Neurological: Positive for headaches.   Psychiatric/Behavioral: Positive for dysphoric mood.       Objective:       Vitals:    04/04/19 1339   BP: 108/72   Pulse: 96   Resp: 18   Temp: 97.5 °F (36.4 °C)       Physical Exam   Constitutional: He is oriented to person, place, and time. He appears well-developed and well-nourished. No distress.   HENT:   Head: Normocephalic and atraumatic.   Right Ear: Hearing, tympanic membrane, external ear and ear canal normal.   Left Ear: Hearing, tympanic membrane, external ear and ear canal normal.   Nose: Nose normal. Right sinus exhibits no maxillary sinus tenderness and no frontal sinus tenderness. Left sinus exhibits no maxillary sinus tenderness and no frontal sinus tenderness.   Mouth/Throat: Uvula is midline, oropharynx is clear and moist and mucous membranes are normal.   Eyes: Conjunctivae are normal. Right eye exhibits no discharge. Left eye exhibits no discharge.   Neck: Trachea normal, normal range of motion and full passive range of motion without pain.   Cardiovascular: Normal rate, regular rhythm, normal heart sounds and intact distal pulses.   Pulmonary/Chest: Effort normal  and breath sounds normal. No respiratory distress. He has no decreased breath sounds. He has no wheezes.   Abdominal: Soft. Normal appearance and bowel sounds are normal. He exhibits no distension and no mass. There is no tenderness. There is no guarding. No hernia.   Musculoskeletal: Normal range of motion. He exhibits no edema or deformity.   Lymphadenopathy:     He has no cervical adenopathy.   Neurological: He is alert and oriented to person, place, and time.   Skin: Skin is warm, dry and intact. No rash noted. No erythema. No pallor.   Psychiatric: He has a normal mood and affect. His speech is normal and behavior is normal. Thought content normal.       Assessment:       1. Other chronic pain    2. MS (multiple sclerosis)    3. Anxiety    4. Neuropathy    5. Restless leg syndrome        Plan:   Other chronic pain  MS (multiple sclerosis)  Anxiety  Neuropathy    Ongoing pain issue  Gabapentin is not helping  We will try for lyrica again  When I went to prescribe the 25 mg dose appears to be on formulary - will try and obtain PA If need be.  We will start at 25 mg dose and titrate upwards if he responds well to medication  Patient is aware not to take with gabapentin and watch timing with klonopin.  I will try again to get in with pain management - but I do not feel comfortable prescribing benzo and opiate simultaneously.     I will refill the klonopin as he reports it is the only thing that helps him sleep.  He is still taking elavil daily.    Taking Vit D    Will do UDS given benzo use  Reports clean - no drug use    Restless leg  Requip works well for him    Fatigue  TSH        2 month f/u       No follow-ups on file.

## 2019-04-08 ENCOUNTER — TELEPHONE (OUTPATIENT)
Dept: INTERNAL MEDICINE | Facility: CLINIC | Age: 49
End: 2019-04-08

## 2019-04-08 NOTE — TELEPHONE ENCOUNTER
----- Message from Shahana Goodwin sent at 4/8/2019  9:42 AM CDT -----  Contact: Patient  Type:  Patient Returning Call    Who Called:Patient  Who Left Message for Patient:nurse  Does the patient know what this is regarding?:does not know  Would the patient rather a call back or a response via [facility]? call  Best Call Back Number:[phone].  Additional Information:

## 2019-04-16 RX ORDER — AMANTADINE HYDROCHLORIDE 100 MG/1
CAPSULE, GELATIN COATED ORAL
Qty: 60 CAPSULE | Refills: 0 | Status: SHIPPED | OUTPATIENT
Start: 2019-04-16 | End: 2019-04-29 | Stop reason: ALTCHOICE

## 2019-04-29 ENCOUNTER — OFFICE VISIT (OUTPATIENT)
Dept: NEUROLOGY | Facility: CLINIC | Age: 49
End: 2019-04-29
Payer: MEDICAID

## 2019-04-29 VITALS
DIASTOLIC BLOOD PRESSURE: 92 MMHG | BODY MASS INDEX: 29.01 KG/M2 | WEIGHT: 169 LBS | SYSTOLIC BLOOD PRESSURE: 134 MMHG | HEART RATE: 109 BPM

## 2019-04-29 DIAGNOSIS — Z86.39 HISTORY OF VITAMIN D DEFICIENCY: ICD-10-CM

## 2019-04-29 DIAGNOSIS — N31.9 NEUROGENIC BLADDER: ICD-10-CM

## 2019-04-29 DIAGNOSIS — G35 MS (MULTIPLE SCLEROSIS): Primary | ICD-10-CM

## 2019-04-29 DIAGNOSIS — Z11.3 SCREEN FOR STD (SEXUALLY TRANSMITTED DISEASE): ICD-10-CM

## 2019-04-29 DIAGNOSIS — R53.83 OTHER FATIGUE: ICD-10-CM

## 2019-04-29 DIAGNOSIS — G25.81 RESTLESS LEG: ICD-10-CM

## 2019-04-29 PROCEDURE — 99215 OFFICE O/P EST HI 40 MIN: CPT | Mod: S$PBB,,, | Performed by: PSYCHIATRY & NEUROLOGY

## 2019-04-29 PROCEDURE — 99999 PR PBB SHADOW E&M-EST. PATIENT-LVL III: CPT | Mod: PBBFAC,,, | Performed by: PSYCHIATRY & NEUROLOGY

## 2019-04-29 PROCEDURE — 99215 PR OFFICE/OUTPT VISIT, EST, LEVL V, 40-54 MIN: ICD-10-PCS | Mod: S$PBB,,, | Performed by: PSYCHIATRY & NEUROLOGY

## 2019-04-29 PROCEDURE — 99999 PR PBB SHADOW E&M-EST. PATIENT-LVL III: ICD-10-PCS | Mod: PBBFAC,,, | Performed by: PSYCHIATRY & NEUROLOGY

## 2019-04-29 PROCEDURE — 99213 OFFICE O/P EST LOW 20 MIN: CPT | Mod: PBBFAC | Performed by: PSYCHIATRY & NEUROLOGY

## 2019-04-29 RX ORDER — MODAFINIL 100 MG/1
100 TABLET ORAL DAILY
COMMUNITY
End: 2019-04-29

## 2019-04-29 RX ORDER — CHOLECALCIFEROL (VITAMIN D3) 125 MCG
5000 CAPSULE ORAL DAILY
Qty: 30 CAPSULE | Refills: 5 | Status: SHIPPED | OUTPATIENT
Start: 2019-04-29 | End: 2020-02-18 | Stop reason: SDUPTHER

## 2019-04-29 RX ORDER — MODAFINIL 200 MG/1
200 TABLET ORAL DAILY
Qty: 30 TABLET | Refills: 5 | Status: SHIPPED | OUTPATIENT
Start: 2019-04-29 | End: 2020-02-24 | Stop reason: SDUPTHER

## 2019-04-29 RX ORDER — ROPINIROLE 0.5 MG/1
0.5 TABLET, FILM COATED ORAL NIGHTLY
Qty: 90 TABLET | Refills: 0 | Status: SHIPPED | OUTPATIENT
Start: 2019-04-29 | End: 2019-05-07 | Stop reason: SDUPTHER

## 2019-04-29 RX ORDER — AMITRIPTYLINE HYDROCHLORIDE 100 MG/1
TABLET ORAL
Qty: 90 TABLET | Refills: 0 | Status: SHIPPED | OUTPATIENT
Start: 2019-04-29 | End: 2019-08-23 | Stop reason: SDUPTHER

## 2019-04-29 NOTE — PROGRESS NOTES
"Subjective:       Patient ID: Logan Haywood is a 48 y.o. male who presents today for a routine clinic visit for MS followup.      MS History- updated as needed  RH male with history of mood disorders and substance abuse, MS diagnosed in 2/2018.   - Since 2013/2014, he's had has numbness and painful tingling in feet and restless leg movements at night. Has always had issues with fatigue.   - 2/2018 - Admitted to hospital ([facility] [place] and diagnosed with MS w/ numbness and weakness to the left side of body with left-sided pain radiating down left body; no Lhermitte's. MRI brain with multiple elliptical foci of T2 hyperintensity perpendicular to the lateral ventricles and some subcortical in both frontal lobes, consistent with demyelination. There was one enhancing lesion in the subcortical region of the lateral left temporal lobe. He also had several lesions in C-spine with one enhancing at C2. He was advised to stay for LP and IV steroids, but patient chose to leave AMA. He returned to ED 2/27 for rest of w/u and treatment. He was treated with IV steroids and started on paroxetine for depression as he reported SI and clonazepam for restless legs/sleep.   3/2018 - Reported right hand numbness in fingertips, but no new significant symptoms. When he lays down on left side, he feels altered sensation like there's a pillow between his legs. He was started on Tecfidera 3/2018, but initially not adherent. Resumed medication 5/2018.  - 1/2019 Tecfidera stopped due to significant leukopenia.  - 3/2019 switched to Copaxone.      TODAY/SUBJECTIVE:  · DMT: Copaxone daily  · Side effects from DMT? Some ISR, initially had pen set on wrong setting but since corrected  · Taking vitamin D3 as recommended? Not taking x 1week. Ran out. Dose: was taking vit D 5000 iu  · No new symptoms/signs of relapse.  · Still with significant fatigue. Has not been taking modafinil as he "didn't know about the rx."  · Neuropathic pain is improved on " "100mg elavil nightly.   · Completed some PT/OT, but mostly exercising at home now at least 2x a week.  · His disability was denied and he is currently appealing the decision -- court date is 7/2019.      SOCIAL HISTORY  Social History     Tobacco Use    Smoking status: Current Some Day Smoker     Packs/day: 1.00     Years: 32.00     Pack years: 32.00     Types: Cigarettes    Smokeless tobacco: Never Used   Substance Use Topics    Alcohol use: Yes     Comment: seldom    Drug use: No   Living arrangements - the patient currently stays with mother in her one-bedroom apt..  Employment: Unemployed. Pdg disability decision; court date 7/2019.  Cocaine - reports sober x >15yrs, but relapsed briefly when he found out he had MS.    Review of systems:   · Fatigue: Yes - "severe"   · Sleep Disturbance: Yes - no prior sleep study   · Bladder Dysfunction: No retention. Polyuria. Urge incontinence. 4/2019 - now with urinary retention, some dribbling.  · Bowel Dysfunction: Yes - 4/2019 - no further issues with bowel incontinence, none in a few months.  · Spasticity: No   · Visual Symptoms: Yes - wears glasses, feels eyesight is worsening. No ophthalmologist   · Cognitive: Yes - sometimes with difficulty getting words out   · Mood Disorder: Yes - worry a lot, considered suicide, decreased appetite, anxiety, loss of energy. States that suicidal thoughts are "passing thoughts" and would never act on it.   · Gait Disturbance: Yes - unsteady due to left leg weakness, bump into walls. 1/2019 has stated insurance won't cover PT/OT and he can't afford it, but SW found several resources covered by patient's insurance.  · Falls: No  · Hand Dysfunction: Yes - left hand weakness, "cannot  anything"  · Pain: Yes - neck pain, back pain (near left shoulder blade), aching joints, plantar fasciitis in bilateral feet   · Sexual Dysfunction: Not Assessed   · Skin Breakdown: Yes - hair loss   · Tremors: No   · Dysphagia: Yes - difficulty " getting food down.   · Dysarthria: No but occasionally will trip on words  · Heat sensitivity: Yes - increased fatigue   · Any un-met adaptive needs? none  · Copay Assist? Yes        Objective:        1. 25 foot timed walk: 1/2019 7.4s (w/o assist) <-- 5.78s (5/2018) <-- 3/2018 8.26s w/o assist  No flowsheet data found.  2. SDMT:  3. 9 Hole Peg Test:  No flowsheet data found.    Neurologic Exam      MENTAL STATUS: grossly intact. Normal language, attention, and memory.   CRANIAL NERVE EXAM: Extraocular muscles are intact.  No facial asymmetry. tongue midline. Shoulder shrug normal b/l. There is no dysarthria.  MOTOR EXAM: Normal bulk and tone throughout UE and LE bilaterally. Strength is 5/5 in all groups in the lower extremities and upper extremities except 5- left HF (pain limited).   COORDINATION: Normal finger-to-nose exam.   GAIT: normal narrow gait.      Imaging:       MRI Brain w/wo 9/2018 - Multiple foci of increased signal in a periventricular distribution bilaterally similar to the prior examination dated 02/26/2018.  No evidence of abnormal enhancement post contrast material.  No evidence of acute demyelination. 2/2018 - Findings are most concerning for demyelinating disease and multiple sclerosis.  One of the lesions within the subcortical white matter of the left temporal lobe demonstrates abnormal enhancement     MRI C-spine w/wo 9/2018 - Patchy areas of signal can be seen in the cord at C2, C3, C5, C7 and T2. This is unchanged when compared with the prior study dated 02/26/2018.  No abnormal enhancement to suspect acute demyelination. 2/2018 - There are four separate foci of abnormal T2 signal within the cervical cord as discussed above.  The one at the level of C2 demonstrates abnormal enhancement.      Labs:     Lab Results   Component Value Date    CTNMGKAP80WY 43 04/04/2019    YJRVYHKC29QR 18 (L) 01/24/2019    CJMNFIPT13EU 34 05/24/2018     No results found for: JCVINDEX, JCVANTIBODY  Lab Results    Component Value Date    OH4NXHSL 63.4 12/20/2018    ABSOLUTECD3 297 (L) 12/20/2018    SP4YXTWW 10.7 12/20/2018    ABSOLUTECD8 50.3 (L) 12/20/2018    NE9DKDIU 51.9 12/20/2018    ABSOLUTECD4 243 (L) 12/20/2018    LABCD48 4.83 (H) 12/20/2018     Lab Results   Component Value Date    WBC 5.93 01/24/2019    RBC 5.55 01/24/2019    HGB 16.9 01/24/2019    HCT 49.4 01/24/2019    MCV 89 01/24/2019    MCH 30.5 01/24/2019    MCHC 34.2 01/24/2019    RDW 12.5 01/24/2019     01/24/2019    MPV 9.9 01/24/2019    GRAN 4.4 01/24/2019    GRAN 73.8 (H) 01/24/2019    LYMPH 0.7 (L) 01/24/2019    LYMPH 11.0 (L) 01/24/2019    MONO 0.5 01/24/2019    MONO 7.6 01/24/2019    EOS 0.2 01/24/2019    BASO 0.08 01/24/2019    EOSINOPHIL 3.9 01/24/2019    BASOPHIL 1.3 01/24/2019       Chemistry        Component Value Date/Time     04/04/2019 1441    K 4.8 04/04/2019 1441     04/04/2019 1441    CO2 27 04/04/2019 1441    BUN 7 04/04/2019 1441    CREATININE 0.8 04/04/2019 1441    GLU 95 04/04/2019 1441        Component Value Date/Time    CALCIUM 10.3 04/04/2019 1441    ALKPHOS 93 04/04/2019 1441    AST 31 04/04/2019 1441    ALT 69 (H) 04/04/2019 1441    BILITOT 0.7 04/04/2019 1441    ESTGFRAFRICA >60.0 04/04/2019 1441    EGFRNONAA >60.0 04/04/2019 1441              Diagnosis/Assessment/Plan:   1. Multiple sclerosis  · Assessment: Diagnosed 2/2018 based on clinical history, MRI, and CSF studies. Initially started on Tecfidera, but developed severe lymphopenia. Transitioned to Copaxone.   · Imaging: MRI brain and c-spine ~8/2019  · Labs: CBC and CMP for monitoring high risk safety medications.   · DMT: continue Copaxone  · The the patient was counseled about the importance of vitamin D supplementation in multiple sclerosis, and the fact that recent data suggests that high circulating blood levels of vitamin D has an ameliorating effect on the disease course in multiple sclerosis in general. Repeat vit D level next visit. Given rx for  5000 units daily  · Will continue to encourage smoking cessation.     2. MS Symptomatic management:  · Fatigue/sleep: still may benefit from sleep study, but may be related to MS. Rx for 100- 200mg daily.  · Bladder dysfunction: bowel/bladder incontinence. Urology referral placed but did not follow-up. Referral placed once again.  · Visual symptoms: seen by ophthalmology 12/2018, due 12/2019  · Cognitive: may need neuropsych testing in future but suspect mood and sleep disorder contributing more than MS at this time  · Mood disorder: depressive symptoms present. Appears somewhat improved with elavil  · Pain: on lyrica 25mg bid, but actual conversion for prior gabapentin dose is 50-100mg bid. Will message PCP  · Gait disturbance: continue outpatient PT/OT exercises. Next visit will discuss Ampyra. Needs cane for home use to facilitate safe ambulation, to help with gait training, and to help with motor fatigue.   · Restless leg: continue requip 0.5mg qhs    3. Unprotected sex recently. Will screen for STD's.    RTC in 4 months      Over 50% of this 40 minute visit was spent in direct face to face counseling of the patient about MS, DMT management, and MS symptom management.         There are no diagnoses linked to this encounter.

## 2019-05-03 ENCOUNTER — TELEPHONE (OUTPATIENT)
Dept: INTERNAL MEDICINE | Facility: CLINIC | Age: 49
End: 2019-05-03

## 2019-05-03 ENCOUNTER — TELEPHONE (OUTPATIENT)
Dept: PHARMACY | Facility: CLINIC | Age: 49
End: 2019-05-03

## 2019-05-03 NOTE — TELEPHONE ENCOUNTER
----- Message from Scot Gibbs sent at 5/3/2019  2:57 PM CDT -----  Contact: pt   Pt calling in reference to medication change, pt would like cb to discuss             [phone]    Medication escribed.  Patient notified needs follow up appt.

## 2019-05-03 NOTE — TELEPHONE ENCOUNTER
Patient wants to know about the change in his medication Ropinrole.  Insurance will not cover the clonazepam wants to know can we do a prior auth for the medication.  Will call the patient back. /fabien/

## 2019-05-03 NOTE — TELEPHONE ENCOUNTER
----- Message from Em Duran sent at 5/3/2019  3:27 PM CDT -----  Contact: [facility]  [phone] option 2  States that she is calling to let provider know that pt needs a PA for the medication clonazepam 0.5mg. States that you can go to [email or web address] for a form or call [phone] option 2//thank you acc

## 2019-05-06 DIAGNOSIS — G62.9 NEUROPATHY: ICD-10-CM

## 2019-05-06 DIAGNOSIS — F41.9 ANXIETY: ICD-10-CM

## 2019-05-06 DIAGNOSIS — G35 MS (MULTIPLE SCLEROSIS): ICD-10-CM

## 2019-05-06 DIAGNOSIS — G89.29 OTHER CHRONIC PAIN: ICD-10-CM

## 2019-05-06 NOTE — TELEPHONE ENCOUNTER
----- Message from Em Duran sent at 5/6/2019  3:05 PM CDT -----  Contact: self [phone]  Type:  RX Refill Request    Who Called: Logan Haywood  Refill or New Rx:refill  RX Name and Strength:lyrica 25mg  How is the patient currently taking it? (ex. 1XDay):2x day  Is this a 30 day or 90 day RX:30 day  Preferred Pharmacy with phone number:    [id number] - [place], [place] - [address] AT [facility] [address]   [address] [facility] [address]  Phone: [phone] Fax: [phone]    Local or Mail Order:local  Ordering Provider:Alfred  Would the patient rather a call back or a response via [facility]? Call back   Best Call Back Number:[phone]  Additional Information: Also states that he has questions regarding his other medications.

## 2019-05-07 DIAGNOSIS — G25.81 RESTLESS LEG: ICD-10-CM

## 2019-05-07 RX ORDER — PREGABALIN 25 MG/1
25 CAPSULE ORAL 2 TIMES DAILY
Qty: 60 CAPSULE | Refills: 3 | Status: SHIPPED | OUTPATIENT
Start: 2019-05-07 | End: 2019-05-10 | Stop reason: DRUGHIGH

## 2019-05-07 RX ORDER — ROPINIROLE 0.5 MG/1
0.5 TABLET, FILM COATED ORAL 3 TIMES DAILY
Qty: 180 TABLET | Refills: 2 | Status: SHIPPED | OUTPATIENT
Start: 2019-05-07 | End: 2019-05-28 | Stop reason: SDUPTHER

## 2019-05-07 NOTE — TELEPHONE ENCOUNTER
----- Message from Em Roger sent at 5/7/2019 10:33 AM CDT -----  Contact: self [phone]  Type:  RX Refill Request    Who Called: Logan Haywood  Refill or New Rx:refill  RX Name and Strength:lyrica 25mg  How is the patient currently taking it? (ex. 1XDay):2x day  Is this a 30 day or 90 day RX:30 day  Preferred Pharmacy with phone number:    [id number] - [place], [place] - [address] [facility] [address]   [address] [facility] [address]  Phone: [phone] Fax: [phone]    Local or Mail Order:local  Ordering Provider:Alfred  Would the patient rather a call back or a response via [facility]? Call back  Best Call Back Number:[phone]  Additional Information: Also states that a PA is needed for the rx of Clonazepam 0.5mg. States that he wants to know why his Ropinirole was dropped from 3x day to 1x day.

## 2019-05-10 ENCOUNTER — TELEPHONE (OUTPATIENT)
Dept: PSYCHIATRY | Facility: CLINIC | Age: 49
End: 2019-05-10

## 2019-05-10 RX ORDER — PREGABALIN 50 MG/1
50 CAPSULE ORAL 3 TIMES DAILY
Qty: 180 CAPSULE | Refills: 0 | Status: SHIPPED | OUTPATIENT
Start: 2019-05-10 | End: 2019-06-04 | Stop reason: SDUPTHER

## 2019-05-10 RX ORDER — ZOLPIDEM TARTRATE 5 MG/1
5 TABLET ORAL NIGHTLY PRN
Qty: 30 TABLET | Refills: 0 | Status: SHIPPED | OUTPATIENT
Start: 2019-05-10 | End: 2019-06-04 | Stop reason: SDUPTHER

## 2019-05-10 NOTE — PROGRESS NOTES
Subjective:       Patient ID: Logan Haywood is a 48 y.o. male.    Chief Complaint: No chief complaint on file.    HPI  Review of Systems    Objective:      Physical Exam    Assessment:       No diagnosis found.    Plan:   There are no diagnoses linked to this encounter.    Patient told me klonopin no longer covered  Need to help sleep.  Tells me he is only sleeping maybe 2 hours / night  Will try Ambien for short term  Advise avoid other meds / alcohol with it.  Low dose  Follow up to discuss alternatives.          No follow-ups on file.

## 2019-05-10 NOTE — TELEPHONE ENCOUNTER
SW received referral from XAVIER Mills MD to assist pt in getting a cane.  Researched pt's insurance benefit and located vendor: [employer] in [place] [phone], fax: [phone].  Faxed order and visit note and notified pt by voicemail.

## 2019-05-21 ENCOUNTER — TELEPHONE (OUTPATIENT)
Dept: NEUROLOGY | Facility: CLINIC | Age: 49
End: 2019-05-21

## 2019-05-21 NOTE — TELEPHONE ENCOUNTER
----- Message from Yvonne Valdez sent at 2019  4:14 PM CDT -----  Contact: Gisel Carrillo    [phone] option 2   reference this service request number [id number]  Pts prior auth for Tecfidera will  soon.  Pls call [facility] Dept at [phone] for the PA.

## 2019-05-22 NOTE — TELEPHONE ENCOUNTER
Returned call, informed [place] [facility] [place] no longer on Tecfidera.

## 2019-05-28 DIAGNOSIS — G25.81 RESTLESS LEG: ICD-10-CM

## 2019-05-28 RX ORDER — ROPINIROLE 0.5 MG/1
TABLET, FILM COATED ORAL
Qty: 90 TABLET | Refills: 0 | Status: SHIPPED | OUTPATIENT
Start: 2019-05-28 | End: 2019-06-04 | Stop reason: SDUPTHER

## 2019-05-30 ENCOUNTER — TELEPHONE (OUTPATIENT)
Dept: NEUROLOGY | Facility: CLINIC | Age: 49
End: 2019-05-30

## 2019-05-30 NOTE — TELEPHONE ENCOUNTER
----- Message from Ximena Hurd sent at 5/30/2019  9:45 AM CDT -----  Contact: Natalie ([employer]) @ [phone] option 2 ref#[id number]  Calling regarding initiating prior authorization for medication: dimethyl fumarate (TECFIDERA) 240 mg CpDR . Please call.

## 2019-06-04 ENCOUNTER — OFFICE VISIT (OUTPATIENT)
Dept: INTERNAL MEDICINE | Facility: CLINIC | Age: 49
End: 2019-06-04
Payer: MEDICAID

## 2019-06-04 ENCOUNTER — TELEPHONE (OUTPATIENT)
Dept: PHARMACY | Facility: CLINIC | Age: 49
End: 2019-06-04

## 2019-06-04 ENCOUNTER — LAB VISIT (OUTPATIENT)
Dept: LAB | Facility: HOSPITAL | Age: 49
End: 2019-06-04
Attending: FAMILY MEDICINE
Payer: MEDICAID

## 2019-06-04 VITALS
OXYGEN SATURATION: 97 % | TEMPERATURE: 97 F | SYSTOLIC BLOOD PRESSURE: 120 MMHG | HEART RATE: 95 BPM | WEIGHT: 169.56 LBS | RESPIRATION RATE: 18 BRPM | BODY MASS INDEX: 29.1 KG/M2 | DIASTOLIC BLOOD PRESSURE: 84 MMHG

## 2019-06-04 DIAGNOSIS — Z79.899 HIGH RISK MEDICATION USE: ICD-10-CM

## 2019-06-04 DIAGNOSIS — R79.89 ELEVATED LIVER FUNCTION TESTS: ICD-10-CM

## 2019-06-04 DIAGNOSIS — R79.89 ELEVATED LIVER FUNCTION TESTS: Primary | ICD-10-CM

## 2019-06-04 DIAGNOSIS — G25.81 RESTLESS LEG: ICD-10-CM

## 2019-06-04 DIAGNOSIS — G47.00 INSOMNIA, UNSPECIFIED TYPE: ICD-10-CM

## 2019-06-04 DIAGNOSIS — G35 MS (MULTIPLE SCLEROSIS): ICD-10-CM

## 2019-06-04 DIAGNOSIS — R53.83 FATIGUE, UNSPECIFIED TYPE: ICD-10-CM

## 2019-06-04 LAB
ALBUMIN SERPL BCP-MCNC: 4 G/DL (ref 3.5–5.2)
ALP SERPL-CCNC: 84 U/L (ref 55–135)
ALT SERPL W/O P-5'-P-CCNC: 45 U/L (ref 10–44)
AST SERPL-CCNC: 27 U/L (ref 10–40)
BILIRUB DIRECT SERPL-MCNC: 0.2 MG/DL (ref 0.1–0.3)
BILIRUB SERPL-MCNC: 0.6 MG/DL (ref 0.1–1)
PROT SERPL-MCNC: 7.1 G/DL (ref 6–8.4)

## 2019-06-04 PROCEDURE — 99214 OFFICE O/P EST MOD 30 MIN: CPT | Mod: S$PBB,,, | Performed by: FAMILY MEDICINE

## 2019-06-04 PROCEDURE — 36415 COLL VENOUS BLD VENIPUNCTURE: CPT

## 2019-06-04 PROCEDURE — 99213 OFFICE O/P EST LOW 20 MIN: CPT | Mod: PBBFAC | Performed by: FAMILY MEDICINE

## 2019-06-04 PROCEDURE — 80076 HEPATIC FUNCTION PANEL: CPT

## 2019-06-04 PROCEDURE — 99999 PR PBB SHADOW E&M-EST. PATIENT-LVL III: CPT | Mod: PBBFAC,,, | Performed by: FAMILY MEDICINE

## 2019-06-04 PROCEDURE — 99214 PR OFFICE/OUTPT VISIT, EST, LEVL IV, 30-39 MIN: ICD-10-PCS | Mod: S$PBB,,, | Performed by: FAMILY MEDICINE

## 2019-06-04 PROCEDURE — 99999 PR PBB SHADOW E&M-EST. PATIENT-LVL III: ICD-10-PCS | Mod: PBBFAC,,, | Performed by: FAMILY MEDICINE

## 2019-06-04 RX ORDER — LORAZEPAM 0.5 MG/1
0.5 TABLET ORAL EVERY 12 HOURS PRN
Qty: 60 TABLET | Refills: 1 | Status: SHIPPED | OUTPATIENT
Start: 2019-06-04 | End: 2019-08-05

## 2019-06-04 RX ORDER — ZOLPIDEM TARTRATE 5 MG/1
5 TABLET ORAL NIGHTLY PRN
Qty: 30 TABLET | Refills: 1 | Status: SHIPPED | OUTPATIENT
Start: 2019-06-04 | End: 2019-08-05 | Stop reason: DRUGHIGH

## 2019-06-04 RX ORDER — PREGABALIN 50 MG/1
50 CAPSULE ORAL 3 TIMES DAILY
Qty: 180 CAPSULE | Refills: 1 | Status: SHIPPED | OUTPATIENT
Start: 2019-06-04 | End: 2019-08-05

## 2019-06-04 RX ORDER — ROPINIROLE 0.5 MG/1
0.5 TABLET, FILM COATED ORAL 3 TIMES DAILY
Qty: 90 TABLET | Refills: 1 | Status: SHIPPED | OUTPATIENT
Start: 2019-06-04 | End: 2019-10-23 | Stop reason: SDUPTHER

## 2019-06-04 NOTE — TELEPHONE ENCOUNTER
Call attempt 1 for Glatiramer refill and follow-up. LVM for patient and sent Ahaali message. $0.00 copay at 004.    Orlando Monreal, PharmD  Clinical Pharmacist  [facility]  P: [phone]

## 2019-06-04 NOTE — PROGRESS NOTES
Subjective:       Patient ID: Logan Haywood is a 48 y.o. male.    Chief Complaint: Follow-up    HPI     47 yo     MS  Chronic Pain  Trouble sleeping  Depression  Memory problems.  Drug abuse - cocaine    Finds mouth gets dry easily    Falls    Having bout of chills/shaking/ tremors  Last for a long time.  Tremors have been problematic.  Klonopin and Requip have been helpful.    Takes Requip 3x/day.    Taking 1 lyrica when wakes up and then takes 2 before bed.  Helps somewhat with pain.  Helps him go to sleep.  Ambien does help him sleep.      Pains still problematic.              Review of Systems   Constitutional: Negative for activity change.   HENT: Negative for trouble swallowing.    Eyes: Negative for discharge.   Respiratory: Negative for wheezing.    Cardiovascular: Negative for chest pain and palpitations.   Gastrointestinal: Negative for constipation, diarrhea and vomiting.   Genitourinary: Negative for difficulty urinating and hematuria.   Neurological: Negative for headaches.   Psychiatric/Behavioral: Negative for dysphoric mood.       Objective:     Vitals:    06/04/19 1306   BP: 120/84   Pulse: 95   Resp: 18   Temp: 97.4 °F (36.3 °C)       Physical Exam   Constitutional: He is oriented to person, place, and time. He appears well-developed and well-nourished.   HENT:   Head: Normocephalic.   Eyes: Conjunctivae are normal.   Cardiovascular: Normal rate and regular rhythm.   Pulmonary/Chest: Effort normal and breath sounds normal.   Abdominal: Soft.   Lymphadenopathy:     He has no cervical adenopathy.   Neurological: He is alert and oriented to person, place, and time.   Skin: Skin is warm.   Psychiatric: He has a normal mood and affect.       Assessment:       1. Elevated liver function tests    2. Restless leg    3. High risk medication use    4. MS (multiple sclerosis)    5. Insomnia, unspecified type    6. Fatigue, unspecified type        Plan:   Elevated liver function tests  -     Hepatic  function panel; Future; Expected date: 06/04/2019    Restless leg  -     rOPINIRole (REQUIP) 0.5 MG tablet; Take 1 tablet (0.5 mg total) by mouth 3 (three) times daily.  Dispense: 90 tablet; Refill: 1    High risk medication use    MS (multiple sclerosis)    Insomnia, unspecified type    Fatigue, unspecified type    Other orders  -     LORazepam (ATIVAN) 0.5 MG tablet; Take 1 tablet (0.5 mg total) by mouth every 12 (twelve) hours as needed for Anxiety.  Dispense: 60 tablet; Refill: 1  -     zolpidem (AMBIEN) 5 MG Tab; Take 1 tablet (5 mg total) by mouth nightly as needed.  Dispense: 30 tablet; Refill: 1  -     pregabalin (LYRICA) 50 MG capsule; Take 1 capsule (50 mg total) by mouth 3 (three) times daily.  Dispense: 180 capsule; Refill: 1        Anxiety:  benzo's have helped  Also on elavil  Benzo seem to help his tremor as well  We will replace klonopin with ativan for insurance coverage purposes.  Well aware of risk of medication.    Insomnia:  ambien has worked  Will continue for now  Discussed risk with patient.  Do not plan long term use  If LFT continues to be high will need to stop use  Discussed drug interaction.      Fatigue:  modafinil has not helped much  Maybe contributing to sleep issues    MS  Working with Neurology      Extensive conversation again about my concern with his use of these high risk medications. ( Benzo and Ambien ).  This is a difficult case and patient is living under unfortunate circumstances. He has had a life altering diagnosis made since the MS symptoms started - He suffers with chronic pain, chronic fatigue, problems sleeping, and strongly suspect significant mental strife - depression/anxiety. I do feel the use of benzos provides him some physical and mental relief and we will continue use for now. It is my sincere hope this will not be a long term script. Additionally concerned about use of ambien given its side effect panel - especially with benzo ( he is aware not to use  together). However I feel sleep deprivation worsens his overall wellbeing and its occasional use is worth the risk.  We will continue to monitor drug screens and revisit use of these medications at follow up appts.    No follow-ups on file.

## 2019-06-05 ENCOUNTER — TELEPHONE (OUTPATIENT)
Dept: INTERNAL MEDICINE | Facility: CLINIC | Age: 49
End: 2019-06-05

## 2019-06-05 ENCOUNTER — TELEPHONE (OUTPATIENT)
Dept: PHARMACY | Facility: CLINIC | Age: 49
End: 2019-06-05

## 2019-06-05 NOTE — TELEPHONE ENCOUNTER
"Refill and followup call for Glatiramer. Patient confirmed need of the refill. Will deliver via [employer] on 6/10 to arrive on  with patient consent. Copay $0.00 at 004. Address confirmed. Patient has 7 doses remaining (7 day supply)/ next injection due . Patient denies missed doses and no side effects.  No new medications/allergies/medical conditions. Labs are stable. No ER/Urgent care visits in past month. No Sharps container needed. Patient taking the medication as directed. All questions addressed to patient satisfaction. Confirmed 2 patient identifiers - Name and .    Patient states that he has been having welts when he injects, he rotates injection sites frequently. Recommended that he ice the area before and after injections and he agreed that he would do this. States his most common MS symptoms are memory issues, fatigue, and numbness and pain (reaching 8/10) in his hands and feet. He started taking Lyrica last month, 150mg three times daily. He really would rather not have to take so much medication for his numbness and pain, as he states he used to be "an addict". Told him that we could help him manage his medication as much as he felt comfortable and communicate with his provider if he has issues or concerns. He also reports that he misses his hot showers and will sometimes indulge and take one anyway.     Andrade López, PharmD  Clinical Pharmacist  [facility]  P: [phone]    "

## 2019-06-05 NOTE — TELEPHONE ENCOUNTER
----- Message from Angel Simon MD sent at 6/5/2019 10:24 AM CDT -----  Please call the patient regarding his abnormal result.  Liver function has decreased - very near normal  We will continue to monitor such at future appt.

## 2019-06-06 ENCOUNTER — DOCUMENTATION ONLY (OUTPATIENT)
Dept: REHABILITATION | Facility: HOSPITAL | Age: 49
End: 2019-06-06

## 2019-06-06 NOTE — PROGRESS NOTES
Outpatient Therapy Discharge Summary     Name: [facility] Number: [id number]    Therapy Diagnosis: MS/muscle weakness  Physician: Maria Teresa Mills MD    Physician Orders: PT to eval and treat  Medical Diagnosis: MS (multiple sclerosis)  Evaluation Date: 2/18/19      Date of Last visit: 3/22/19  Total Visits Received: 5  Cancelled Visits: 4  No Show Visits: 1    Assessment    Goals:   Short Term Goals:  1-4 week  1.Pt to score greater than 47/56 on SALAS balance to reduce risk of falls- not met  2.Pt to increased trunk and BLE strength 1/2 grade tot reduce risk of falls- not met     Long Term Goals: 6-8 weeks  1.Pt to score 56/56 on SALAS balance to reduce risk of falls to low-not met  2.Pt to demo good core and BLE to reduce risk of falls to low and improve gait-not met  3. Pt to report minimal fatigue after grocery shopping-not met       Discharge reason: Patient has not attended therapy since 3/22/19    Plan   This patient is discharged from Physical Therapy

## 2019-06-07 ENCOUNTER — CLINICAL SUPPORT (OUTPATIENT)
Dept: SMOKING CESSATION | Facility: CLINIC | Age: 49
End: 2019-06-07
Payer: COMMERCIAL

## 2019-06-07 DIAGNOSIS — F17.200 NICOTINE DEPENDENCE: Primary | ICD-10-CM

## 2019-06-07 PROCEDURE — 99407 PR TOBACCO USE CESSATION INTENSIVE >10 MINUTES: ICD-10-PCS | Mod: S$GLB,,,

## 2019-06-07 PROCEDURE — 99407 BEHAV CHNG SMOKING > 10 MIN: CPT | Mod: S$GLB,,,

## 2019-06-07 NOTE — PROGRESS NOTES
Successful contact with patient regarding tobacco cessation quit #1. Pt states, he's currently tobacco free, he fight off periodic cravings, and no further assistance is needed at this time. Pt informed of his benefit status and contact information to schedule an appointment if he need support. Will update the tobacco cessation smart form for 12 months on quit #1.

## 2019-06-08 NOTE — PLAN OF CARE
03/04/18 1410   Final Note   Assessment Type Final Discharge Note   Discharge Disposition Home   What phone number can be called within the next 1-3 days to see how you are doing after discharge? [phone]   Hospital Follow Up  Appt(s) scheduled? (Provided with contact numbers for f/u)   Right Care Referral Info   Post Acute Recommendation No Care      08-Jun-2019 23:49

## 2019-06-14 ENCOUNTER — PATIENT MESSAGE (OUTPATIENT)
Dept: NEUROLOGY | Facility: CLINIC | Age: 49
End: 2019-06-14

## 2019-07-22 ENCOUNTER — PATIENT OUTREACH (OUTPATIENT)
Dept: ADMINISTRATIVE | Facility: HOSPITAL | Age: 49
End: 2019-07-22

## 2019-08-05 ENCOUNTER — OFFICE VISIT (OUTPATIENT)
Dept: INTERNAL MEDICINE | Facility: CLINIC | Age: 49
End: 2019-08-05
Payer: MEDICAID

## 2019-08-05 VITALS
RESPIRATION RATE: 18 BRPM | OXYGEN SATURATION: 95 % | DIASTOLIC BLOOD PRESSURE: 84 MMHG | SYSTOLIC BLOOD PRESSURE: 114 MMHG | TEMPERATURE: 97 F | HEART RATE: 97 BPM | BODY MASS INDEX: 29.18 KG/M2 | WEIGHT: 170 LBS

## 2019-08-05 DIAGNOSIS — G47.00 INSOMNIA, UNSPECIFIED TYPE: ICD-10-CM

## 2019-08-05 DIAGNOSIS — F41.9 ANXIETY: Primary | ICD-10-CM

## 2019-08-05 DIAGNOSIS — G35 MS (MULTIPLE SCLEROSIS): ICD-10-CM

## 2019-08-05 DIAGNOSIS — G25.81 RESTLESS LEG: ICD-10-CM

## 2019-08-05 DIAGNOSIS — G89.29 OTHER CHRONIC PAIN: ICD-10-CM

## 2019-08-05 DIAGNOSIS — Z79.899 HISTORY OF LONG-TERM TREATMENT WITH HIGH-RISK MEDICATION: ICD-10-CM

## 2019-08-05 PROCEDURE — 99213 OFFICE O/P EST LOW 20 MIN: CPT | Mod: PBBFAC | Performed by: FAMILY MEDICINE

## 2019-08-05 PROCEDURE — 99214 OFFICE O/P EST MOD 30 MIN: CPT | Mod: S$PBB,,, | Performed by: FAMILY MEDICINE

## 2019-08-05 PROCEDURE — 99999 PR PBB SHADOW E&M-EST. PATIENT-LVL III: ICD-10-PCS | Mod: PBBFAC,,, | Performed by: FAMILY MEDICINE

## 2019-08-05 PROCEDURE — 99214 PR OFFICE/OUTPT VISIT, EST, LEVL IV, 30-39 MIN: ICD-10-PCS | Mod: S$PBB,,, | Performed by: FAMILY MEDICINE

## 2019-08-05 PROCEDURE — 99999 PR PBB SHADOW E&M-EST. PATIENT-LVL III: CPT | Mod: PBBFAC,,, | Performed by: FAMILY MEDICINE

## 2019-08-05 RX ORDER — PREGABALIN 150 MG/1
150 CAPSULE ORAL 2 TIMES DAILY
Qty: 60 CAPSULE | Refills: 1 | Status: SHIPPED | OUTPATIENT
Start: 2019-08-05 | End: 2019-11-18 | Stop reason: SDUPTHER

## 2019-08-05 RX ORDER — ZOLPIDEM TARTRATE 10 MG/1
10 TABLET ORAL NIGHTLY PRN
Qty: 30 TABLET | Refills: 1 | Status: SHIPPED | OUTPATIENT
Start: 2019-08-05 | End: 2019-09-30 | Stop reason: SDUPTHER

## 2019-08-05 NOTE — PROGRESS NOTES
Subjective:       Patient ID: Logan Haywood is a 48 y.o. male.    Chief Complaint: Follow-up    HPI     47 yo M    47 yo      MS  Chronic Pain  Trouble sleeping  Depression  Memory problems.  Drug abuse - cocaine    Follow up visit    Ongoing issues    Pain is still problematic    Feet hurt more when he lies down.  lyrica was helping    Sleep is problematic still  Taking 150 mg lyrica - subside enough   Taking ambien nightly  Taking elavil nightly.        Review of Systems   Constitutional: Negative for activity change.   Eyes: Negative for discharge.   Respiratory: Negative for wheezing.    Cardiovascular: Negative for chest pain and palpitations.   Gastrointestinal: Negative for constipation, diarrhea and vomiting.   Genitourinary: Negative for difficulty urinating and hematuria.   Neurological: Negative for headaches.   Psychiatric/Behavioral: Negative for dysphoric mood.       Objective:       Vitals:    08/05/19 1331   BP: 114/84   Pulse: 97   Resp: 18   Temp: 97.2 °F (36.2 °C)       Physical Exam   Constitutional: He is oriented to person, place, and time. He appears well-developed.   HENT:   Head: Normocephalic.   Cardiovascular: Normal rate.   Pulmonary/Chest: Effort normal and breath sounds normal.   Abdominal: Soft.   Musculoskeletal: Normal range of motion.   Lymphadenopathy:     He has no cervical adenopathy.   Neurological: He is alert and oriented to person, place, and time.   Skin: No pallor.   Psychiatric: He has a normal mood and affect.       Assessment:       1. Anxiety    2. History of long-term treatment with high-risk medication    3. MS (multiple sclerosis)    4. Insomnia, unspecified type    5. Other chronic pain        Plan:   Anxiety  History of long-term treatment with high-risk medication  MS (multiple sclerosis)  Insomnia, unspecified type  chronic pain    Taking requip  Seems to help his shakes    He is tired of taking medications.    Taking Lyrica  Helps with pain   To help him  sleep - he is taking 3 tablets  Will refill at higher dose  150 mg BID prescribed today      Will continue Ambien and even increase dose.  Will stop the ativan today.   He is comfortable not tapering as he goes days without taking at times with no withdrawal.  He is well aware of my concerns.      His main issue is the chronic pain  I do not want to use opiates given high risk Meds already.    Will see me in 2 months  Will switch from elavil to duloxetine in effort to improve mood.      No follow-ups on file.

## 2019-08-06 ENCOUNTER — TELEPHONE (OUTPATIENT)
Dept: INTERNAL MEDICINE | Facility: CLINIC | Age: 49
End: 2019-08-06

## 2019-08-23 RX ORDER — AMITRIPTYLINE HYDROCHLORIDE 100 MG/1
TABLET ORAL
Qty: 90 TABLET | Refills: 0 | Status: SHIPPED | OUTPATIENT
Start: 2019-08-23 | End: 2019-10-07 | Stop reason: ALTCHOICE

## 2019-08-29 ENCOUNTER — PATIENT MESSAGE (OUTPATIENT)
Dept: NEUROLOGY | Facility: CLINIC | Age: 49
End: 2019-08-29

## 2019-09-11 DIAGNOSIS — G35 MULTIPLE SCLEROSIS: ICD-10-CM

## 2019-09-11 RX ORDER — GLATIRAMER ACETATE 20 MG/ML
20 INJECTION, SOLUTION SUBCUTANEOUS DAILY
Qty: 30 SYRINGE | Refills: 5 | Status: SHIPPED | OUTPATIENT
Start: 2019-09-11 | End: 2020-04-14

## 2019-09-18 ENCOUNTER — TELEPHONE (OUTPATIENT)
Dept: PHARMACY | Facility: CLINIC | Age: 49
End: 2019-09-18

## 2019-09-30 ENCOUNTER — TELEPHONE (OUTPATIENT)
Dept: PHARMACY | Facility: CLINIC | Age: 49
End: 2019-09-30

## 2019-10-02 RX ORDER — ZOLPIDEM TARTRATE 10 MG/1
TABLET ORAL
Qty: 30 TABLET | Refills: 0 | Status: SHIPPED | OUTPATIENT
Start: 2019-10-02 | End: 2019-11-18 | Stop reason: SDUPTHER

## 2019-10-07 ENCOUNTER — OFFICE VISIT (OUTPATIENT)
Dept: INTERNAL MEDICINE | Facility: CLINIC | Age: 49
End: 2019-10-07
Payer: MEDICAID

## 2019-10-07 VITALS
TEMPERATURE: 98 F | WEIGHT: 161.81 LBS | RESPIRATION RATE: 18 BRPM | DIASTOLIC BLOOD PRESSURE: 72 MMHG | SYSTOLIC BLOOD PRESSURE: 108 MMHG | OXYGEN SATURATION: 97 % | HEART RATE: 93 BPM | BODY MASS INDEX: 27.78 KG/M2

## 2019-10-07 DIAGNOSIS — G47.00 INSOMNIA, UNSPECIFIED TYPE: ICD-10-CM

## 2019-10-07 DIAGNOSIS — G89.29 OTHER CHRONIC PAIN: ICD-10-CM

## 2019-10-07 DIAGNOSIS — G35 MS (MULTIPLE SCLEROSIS): Primary | ICD-10-CM

## 2019-10-07 DIAGNOSIS — G62.9 NEUROPATHY: ICD-10-CM

## 2019-10-07 PROCEDURE — 99214 OFFICE O/P EST MOD 30 MIN: CPT | Mod: S$PBB,,, | Performed by: FAMILY MEDICINE

## 2019-10-07 PROCEDURE — 99214 PR OFFICE/OUTPT VISIT, EST, LEVL IV, 30-39 MIN: ICD-10-PCS | Mod: S$PBB,,, | Performed by: FAMILY MEDICINE

## 2019-10-07 PROCEDURE — 99999 PR PBB SHADOW E&M-EST. PATIENT-LVL III: CPT | Mod: PBBFAC,,, | Performed by: FAMILY MEDICINE

## 2019-10-07 PROCEDURE — 99213 OFFICE O/P EST LOW 20 MIN: CPT | Mod: PBBFAC | Performed by: FAMILY MEDICINE

## 2019-10-07 PROCEDURE — 99999 PR PBB SHADOW E&M-EST. PATIENT-LVL III: ICD-10-PCS | Mod: PBBFAC,,, | Performed by: FAMILY MEDICINE

## 2019-10-07 RX ORDER — DULOXETIN HYDROCHLORIDE 30 MG/1
30 CAPSULE, DELAYED RELEASE ORAL DAILY
Qty: 90 CAPSULE | Refills: 1 | Status: SHIPPED | OUTPATIENT
Start: 2019-10-07 | End: 2019-12-04 | Stop reason: SDUPTHER

## 2019-10-07 NOTE — PATIENT INSTRUCTIONS
Dr. Simon Instructions    We are going to switch you from Elavil ( amitriptyline ) to Duloxetine ( cymbalta ) in an effort to improve your nerve pain and maybe your mood.     In order to make this switch it is recommended we cross taper from one drug to another.    So I would recommend every other day dosing for a week or 2 - alternative between the 2 drugs.
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
## 2019-10-14 NOTE — TELEPHONE ENCOUNTER
Refill call for Copaxone. Verified to ship on Wednesday 10/16 for delivery on Thursday 10/17. $0.00 copay at 004.     Orlando Monreal, PharmD  Clinical Pharmacist  [facility]  P: [phone]

## 2019-10-23 DIAGNOSIS — G25.81 RESTLESS LEG: ICD-10-CM

## 2019-10-25 RX ORDER — ZOLPIDEM TARTRATE 10 MG/1
TABLET ORAL
Qty: 30 TABLET | Refills: 0 | OUTPATIENT
Start: 2019-10-25

## 2019-10-25 RX ORDER — ROPINIROLE 0.5 MG/1
TABLET, FILM COATED ORAL
Qty: 90 TABLET | Refills: 0 | Status: SHIPPED | OUTPATIENT
Start: 2019-10-25 | End: 2019-11-18 | Stop reason: SDUPTHER

## 2019-11-07 ENCOUNTER — TELEPHONE (OUTPATIENT)
Dept: INTERNAL MEDICINE | Facility: CLINIC | Age: 49
End: 2019-11-07
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
## 2019-11-12 ENCOUNTER — TELEPHONE (OUTPATIENT)
Dept: PHARMACY | Facility: CLINIC | Age: 49
End: 2019-11-12

## 2019-11-14 ENCOUNTER — TELEPHONE (OUTPATIENT)
Dept: PHARMACY | Facility: CLINIC | Age: 49
End: 2019-11-14

## 2019-11-14 ENCOUNTER — PATIENT MESSAGE (OUTPATIENT)
Dept: PHARMACY | Facility: CLINIC | Age: 49
End: 2019-11-14

## 2019-11-18 DIAGNOSIS — G25.81 RESTLESS LEG: ICD-10-CM

## 2019-11-18 RX ORDER — ROPINIROLE 0.5 MG/1
0.5 TABLET, FILM COATED ORAL 3 TIMES DAILY
Qty: 180 TABLET | Refills: 3 | Status: SHIPPED | OUTPATIENT
Start: 2019-11-18 | End: 2019-12-04 | Stop reason: SDUPTHER

## 2019-11-18 RX ORDER — PREGABALIN 150 MG/1
150 CAPSULE ORAL 2 TIMES DAILY
Qty: 60 CAPSULE | Refills: 1 | Status: SHIPPED | OUTPATIENT
Start: 2019-11-18 | End: 2019-12-19 | Stop reason: SDUPTHER

## 2019-11-18 RX ORDER — ZOLPIDEM TARTRATE 10 MG/1
10 TABLET ORAL NIGHTLY PRN
Qty: 30 TABLET | Refills: 0 | Status: SHIPPED | OUTPATIENT
Start: 2019-11-18 | End: 2019-12-19 | Stop reason: SDUPTHER

## 2019-12-04 DIAGNOSIS — G25.81 RESTLESS LEG: ICD-10-CM

## 2019-12-04 RX ORDER — DULOXETIN HYDROCHLORIDE 30 MG/1
30 CAPSULE, DELAYED RELEASE ORAL DAILY
Qty: 90 CAPSULE | Refills: 1 | Status: SHIPPED | OUTPATIENT
Start: 2019-12-04 | End: 2020-02-21

## 2019-12-04 RX ORDER — ROPINIROLE 0.5 MG/1
0.5 TABLET, FILM COATED ORAL 3 TIMES DAILY
Qty: 180 TABLET | Refills: 3 | Status: SHIPPED | OUTPATIENT
Start: 2019-12-04 | End: 2020-02-21 | Stop reason: SDUPTHER

## 2019-12-10 ENCOUNTER — TELEPHONE (OUTPATIENT)
Dept: PHARMACY | Facility: CLINIC | Age: 49
End: 2019-12-10

## 2019-12-23 RX ORDER — ZOLPIDEM TARTRATE 10 MG/1
10 TABLET ORAL NIGHTLY PRN
Qty: 30 TABLET | Refills: 1 | Status: SHIPPED | OUTPATIENT
Start: 2019-12-23 | End: 2020-02-21 | Stop reason: SDUPTHER

## 2019-12-23 RX ORDER — PREGABALIN 150 MG/1
150 CAPSULE ORAL 2 TIMES DAILY
Qty: 60 CAPSULE | Refills: 1 | Status: SHIPPED | OUTPATIENT
Start: 2019-12-23 | End: 2020-02-21 | Stop reason: SDUPTHER

## 2019-12-23 RX ORDER — ZOLPIDEM TARTRATE 10 MG/1
TABLET ORAL
Qty: 30 TABLET | OUTPATIENT
Start: 2019-12-23

## 2020-01-14 ENCOUNTER — TELEPHONE (OUTPATIENT)
Dept: PHARMACY | Facility: CLINIC | Age: 50
End: 2020-01-14

## 2020-01-14 NOTE — TELEPHONE ENCOUNTER
RX call attempt 1 regarding Copaxone from [facility]. Patient reached -- shipping 1/16 for 1/17 arrival with patients consent. Patient requested alcohol swabs and bandages with this shipment -- added flags to order. Patient stated that he may have missed a dose due to simply forgetting -- patient denied the transfer to [facility].   copay 0.00 @ 004.

## 2020-02-07 ENCOUNTER — TELEPHONE (OUTPATIENT)
Dept: PHARMACY | Facility: CLINIC | Age: 50
End: 2020-02-07

## 2020-02-07 ENCOUNTER — TELEPHONE (OUTPATIENT)
Dept: NEUROLOGY | Facility: CLINIC | Age: 50
End: 2020-02-07

## 2020-02-07 NOTE — TELEPHONE ENCOUNTER
Refill call for Copaxone. Verified to ship on Wednesday 2/12 for delivery on Thursday 2/13. $0.00 copay at 004.     Orlando Monreal, PharmD  Clinical Pharmacist  [facility]  P: [phone]

## 2020-02-14 ENCOUNTER — PATIENT MESSAGE (OUTPATIENT)
Dept: INTERNAL MEDICINE | Facility: CLINIC | Age: 50
End: 2020-02-14

## 2020-02-17 DIAGNOSIS — G89.29 OTHER CHRONIC PAIN: Primary | ICD-10-CM

## 2020-02-18 DIAGNOSIS — Z86.39 HISTORY OF VITAMIN D DEFICIENCY: ICD-10-CM

## 2020-02-18 RX ORDER — CHOLECALCIFEROL (VITAMIN D3) 125 MCG
5000 CAPSULE ORAL DAILY
Qty: 30 CAPSULE | Refills: 11 | Status: SHIPPED | OUTPATIENT
Start: 2020-02-18

## 2020-02-20 ENCOUNTER — TELEPHONE (OUTPATIENT)
Dept: INTERNAL MEDICINE | Facility: CLINIC | Age: 50
End: 2020-02-20

## 2020-02-20 ENCOUNTER — TELEPHONE (OUTPATIENT)
Dept: PAIN MEDICINE | Facility: CLINIC | Age: 50
End: 2020-02-20

## 2020-02-20 NOTE — TELEPHONE ENCOUNTER
----- Message from Tobias Astudillo sent at 2/20/2020 12:01 PM CST -----  Contact: Self- [phone]  Pt would like a call from nurse in regards to rescheduling jonathan appt. Please call back at [phone].       Thank You,   Tobias Astudillo

## 2020-02-20 NOTE — TELEPHONE ENCOUNTER
----- Message from Angel Simon MD sent at 2/19/2020  1:02 PM CST -----  Order placed. Please arrange  ----- Message -----  From: Verona Horton LPN  Sent: 2/14/2020   4:53 PM CST  To: Angel Simon MD    Referral to pain management for this patient

## 2020-02-21 ENCOUNTER — OFFICE VISIT (OUTPATIENT)
Dept: INTERNAL MEDICINE | Facility: CLINIC | Age: 50
End: 2020-02-21
Payer: MEDICAID

## 2020-02-21 VITALS
BODY MASS INDEX: 27.78 KG/M2 | OXYGEN SATURATION: 99 % | WEIGHT: 161.81 LBS | SYSTOLIC BLOOD PRESSURE: 116 MMHG | HEART RATE: 89 BPM | RESPIRATION RATE: 18 BRPM | DIASTOLIC BLOOD PRESSURE: 82 MMHG | TEMPERATURE: 96 F

## 2020-02-21 DIAGNOSIS — G47.00 INSOMNIA, UNSPECIFIED TYPE: Primary | ICD-10-CM

## 2020-02-21 DIAGNOSIS — F17.200 SMOKING: ICD-10-CM

## 2020-02-21 DIAGNOSIS — F32.A DEPRESSION, UNSPECIFIED DEPRESSION TYPE: ICD-10-CM

## 2020-02-21 DIAGNOSIS — G25.81 RESTLESS LEG: ICD-10-CM

## 2020-02-21 DIAGNOSIS — Z02.9 ADMINISTRATIVE ENCOUNTER: ICD-10-CM

## 2020-02-21 DIAGNOSIS — G62.9 NEUROPATHY: ICD-10-CM

## 2020-02-21 PROCEDURE — 99214 OFFICE O/P EST MOD 30 MIN: CPT | Mod: S$PBB,,, | Performed by: FAMILY MEDICINE

## 2020-02-21 PROCEDURE — 99213 OFFICE O/P EST LOW 20 MIN: CPT | Mod: PBBFAC | Performed by: FAMILY MEDICINE

## 2020-02-21 PROCEDURE — 99214 PR OFFICE/OUTPT VISIT, EST, LEVL IV, 30-39 MIN: ICD-10-PCS | Mod: S$PBB,,, | Performed by: FAMILY MEDICINE

## 2020-02-21 PROCEDURE — 99999 PR PBB SHADOW E&M-EST. PATIENT-LVL III: ICD-10-PCS | Mod: PBBFAC,,, | Performed by: FAMILY MEDICINE

## 2020-02-21 PROCEDURE — 99999 PR PBB SHADOW E&M-EST. PATIENT-LVL III: CPT | Mod: PBBFAC,,, | Performed by: FAMILY MEDICINE

## 2020-02-21 RX ORDER — ROPINIROLE 0.5 MG/1
TABLET, FILM COATED ORAL
Qty: 180 TABLET | Refills: 1 | Status: SHIPPED | OUTPATIENT
Start: 2020-02-21 | End: 2020-08-10

## 2020-02-21 RX ORDER — ZOLPIDEM TARTRATE 10 MG/1
10 TABLET ORAL NIGHTLY PRN
Qty: 30 TABLET | Refills: 1 | Status: SHIPPED | OUTPATIENT
Start: 2020-02-21 | End: 2020-03-18 | Stop reason: SDUPTHER

## 2020-02-21 RX ORDER — PREGABALIN 150 MG/1
150 CAPSULE ORAL 2 TIMES DAILY
Qty: 60 CAPSULE | Refills: 1 | Status: SHIPPED | OUTPATIENT
Start: 2020-02-21 | End: 2020-07-23

## 2020-02-21 RX ORDER — DULOXETIN HYDROCHLORIDE 60 MG/1
60 CAPSULE, DELAYED RELEASE ORAL DAILY
Qty: 90 CAPSULE | Refills: 1 | Status: SHIPPED | OUTPATIENT
Start: 2020-02-21 | End: 2020-07-23 | Stop reason: SDUPTHER

## 2020-02-21 NOTE — PROGRESS NOTES
"Subjective:       Patient ID: Logan Haywood is a 49 y.o. male.    Chief Complaint: Follow-up    HPI   50 yo M        MS  Chronic Pain  Trouble sleeping  Depression  Memory problems.  Drug abuse - cocaine    Past Surgical History:   Procedure Laterality Date    TONSILLECTOMY       Family History   Problem Relation Age of Onset    Hypertension Mother     COPD Mother     Heart disease Father     Diabetes Sister     Stroke Brother          Has scheduled pain mngt -  Upcoming 3/3/20  Had to reschedule due to a class      Ongoing chronic pain    Has had a few falls  Legs have just gone out on him.    RLS sign.  requip does help    Mood has felt improved  Credits cymbalta    Getting "used to his disease"      Review of Systems   Constitutional: Negative for activity change.   Eyes: Negative for discharge.   Respiratory: Negative for wheezing.    Cardiovascular: Negative for chest pain and palpitations.   Gastrointestinal: Positive for constipation. Negative for diarrhea and vomiting.   Genitourinary: Negative for difficulty urinating and hematuria.   Musculoskeletal: Negative for joint swelling.   Neurological: Negative for headaches.   Psychiatric/Behavioral: Negative for dysphoric mood.       Objective:       Vitals:    02/21/20 1346   BP: 116/82   Pulse: 89   Resp: 18   Temp: 96.1 °F (35.6 °C)       Physical Exam   Constitutional: He is oriented to person, place, and time. He appears well-developed and well-nourished. No distress.   HENT:   Head: Normocephalic and atraumatic.   Eyes: Conjunctivae are normal. Right eye exhibits no discharge. Left eye exhibits no discharge.   Neck: Trachea normal and full passive range of motion without pain.   Cardiovascular: Normal rate, regular rhythm and normal heart sounds.   Pulmonary/Chest: Effort normal and breath sounds normal. No respiratory distress. He has no decreased breath sounds. He has no wheezes.   Abdominal: Soft. Normal appearance. There is no tenderness. "   Musculoskeletal: He exhibits no edema or deformity.   Lymphadenopathy:     He has no cervical adenopathy.   Neurological: He is alert and oriented to person, place, and time.   Skin: Skin is intact. No pallor.   Psychiatric: He has a normal mood and affect. His speech is normal and behavior is normal. Thought content normal.       Assessment:       1. Insomnia, unspecified type    2. Restless leg    3. Neuropathy    4. Depression, unspecified depression type    5. Administrative encounter    6. Smoking        Plan:   Insomnia  On ambien    Neuropathy  Chronic pain  cymbalta  Arranged for pain mngt    RLS  requip  0.5 mg - takes 2 at night - 1 mg at night.  Takes 0.5 mg when wakes    Depression  cymbalta    Declines vaccines    Smoking  Discussed cessation      Admin  Handicap form    F/u 3 mo  Labs then

## 2020-02-24 DIAGNOSIS — R53.83 OTHER FATIGUE: ICD-10-CM

## 2020-02-24 DIAGNOSIS — G35 MS (MULTIPLE SCLEROSIS): ICD-10-CM

## 2020-02-26 RX ORDER — MODAFINIL 200 MG/1
200 TABLET ORAL DAILY
Qty: 30 TABLET | Refills: 5 | Status: SHIPPED | OUTPATIENT
Start: 2020-02-26 | End: 2020-08-12 | Stop reason: SDUPTHER

## 2020-02-28 ENCOUNTER — PATIENT OUTREACH (OUTPATIENT)
Dept: ADMINISTRATIVE | Facility: OTHER | Age: 50
End: 2020-02-28

## 2020-03-02 ENCOUNTER — OFFICE VISIT (OUTPATIENT)
Dept: NEUROLOGY | Facility: CLINIC | Age: 50
End: 2020-03-02
Payer: MEDICAID

## 2020-03-02 DIAGNOSIS — Z79.899 ENCOUNTER FOR MONITORING IMMUNOMODULATING THERAPY: ICD-10-CM

## 2020-03-02 DIAGNOSIS — Z86.39 HISTORY OF VITAMIN D DEFICIENCY: ICD-10-CM

## 2020-03-02 DIAGNOSIS — G35 MS (MULTIPLE SCLEROSIS): Primary | ICD-10-CM

## 2020-03-02 DIAGNOSIS — Z51.81 ENCOUNTER FOR MONITORING IMMUNOMODULATING THERAPY: ICD-10-CM

## 2020-03-02 PROCEDURE — 99214 OFFICE O/P EST MOD 30 MIN: CPT | Mod: 95,,, | Performed by: PSYCHIATRY & NEUROLOGY

## 2020-03-02 PROCEDURE — 99214 PR OFFICE/OUTPT VISIT, EST, LEVL IV, 30-39 MIN: ICD-10-PCS | Mod: 95,,, | Performed by: PSYCHIATRY & NEUROLOGY

## 2020-03-02 NOTE — PROGRESS NOTES
Subjective:       Patient ID: Logan Haywood is a 49 y.o. male who presents today for a routine telemedicine visit for MS followup.      MS History- updated as needed  RH male with history of mood disorders and substance abuse, MS diagnosed in 2/2018.   - Since 2013/2014, he's had has numbness and painful tingling in feet and restless leg movements at night. Has always had issues with fatigue.   - 2/2018 - Admitted to hospital ([facility] and diagnosed with MS w/ numbness and weakness to the left side of body with left-sided pain radiating down left body; no Lhermitte's. MRI brain with multiple elliptical foci of T2 hyperintensity perpendicular to the lateral ventricles and some subcortical in both frontal lobes, consistent with demyelination. There was one enhancing lesion in the subcortical region of the lateral left temporal lobe. He also had several lesions in C-spine with one enhancing at C2. He was advised to stay for LP and IV steroids, but patient chose to leave AMA. He returned to ED 2/27 for rest of w/u and treatment. He was treated with IV steroids and started on paroxetine for depression as he reported SI and clonazepam for restless legs/sleep.   3/2018 - Reported right hand numbness in fingertips, but no new significant symptoms. When he lays down on left side, he feels altered sensation like there's a pillow between his legs. He was started on Tecfidera 3/2018, but initially not adherent. Resumed medication 5/2018.  - 1/2019 Tecfidera stopped due to significant leukopenia.  - 3/2019 switched to Copaxone.      TODAY/SUBJECTIVE:  · DMT: Copaxone daily  · Side effects from DMT? ISR's with injections  · Taking vitamin D3 as recommended? yes. Ran out. Dose: 5000 iu. Also taking fish oil  · No new symptoms/signs of relapse.  · Pain is about the same, but depression is better  · He is taking the provigil, which does help with fatigue  · PCP is managing lyrica, ambien, and requip. Legs move a lot at night,  "keeping him awake. Not sleeping well at night, mostly 2-4 hours at the most. Snores when he's asleep.  · Now taking cymbalta for mood, which he states is helping mood but not pain.  · Currently into his own place now, getting ready to move again.        SOCIAL HISTORY  Social History     Tobacco Use    Smoking status: Current Some Day Smoker     Packs/day: 1.00     Years: 32.00     Pack years: 32.00     Types: Cigarettes    Smokeless tobacco: Never Used   Substance Use Topics    Alcohol use: Yes     Comment: seldom    Drug use: No   Living arrangements - lives alone, moving to a first floor apt  Employment: Unemployed. Got disability for finances      Review of systems:   · Fatigue: Yes - "severe"   · Sleep Disturbance: Yes - no prior sleep study   · Bladder Dysfunction: No retention. No incontinence but some urgency  · Bowel Dysfunction: none  · Spasticity: No   · Visual Symptoms: Yes - wears glasses, feels eyesight is worsening. No ophthalmologist   · Cognitive: no, improved with improvement in mood  · Mood Disorder: Yes - mild depression  · Gait Disturbance: yes, some imbalance  · Falls: No  · Hand Dysfunction: yes, numbness in hands  · Pain: Yes - feet, hands, legs (L>R)  · Skin Breakdown: no    · Tremors: No   · Dysphagia: Yes - difficulty getting food down.   · Dysarthria: No  · Heat sensitivity: Yes - increased fatigue   · Any un-met adaptive needs? none  · Copay Assist? Yes        Objective:        1. 25 foot timed walk: 1/2019 7.4s (w/o assist) <-- 5.78s (5/2018) <-- 3/2018 8.26s w/o assist  No flowsheet data found.  2. SDMT:  3. 9 Hole Peg Test:  No flowsheet data found.    Neurologic Exam      MENTAL STATUS: grossly intact. Normal language, attention, and memory.   CRANIAL NERVE EXAM: Extraocular muscles are intact.  No facial asymmetry. tongue midline. Shoulder shrug normal b/l. There is no dysarthria.  MOTOR EXAM: at least antigravity x 4.   COORDINATION: No ataxia in UE's.         Imaging: "       MRI Brain w/wo 9/2018 - Multiple foci of increased signal in a periventricular distribution bilaterally similar to the prior examination dated 02/26/2018.  No evidence of abnormal enhancement post contrast material.  No evidence of acute demyelination. 2/2018 - Findings are most concerning for demyelinating disease and multiple sclerosis.  One of the lesions within the subcortical white matter of the left temporal lobe demonstrates abnormal enhancement     MRI C-spine w/wo 9/2018 - Patchy areas of signal can be seen in the cord at C2, C3, C5, C7 and T2. This is unchanged when compared with the prior study dated 02/26/2018.  No abnormal enhancement to suspect acute demyelination. 2/2018 - There are four separate foci of abnormal T2 signal within the cervical cord as discussed above.  The one at the level of C2 demonstrates abnormal enhancement.      Labs:     Lab Results   Component Value Date    BJIRCVGM28DS 43 04/04/2019    VTOFYDCR76MV 18 (L) 01/24/2019    DLUODLPM10IH 34 05/24/2018     No results found for: JCVINDEX, JCVANTIBODY  Lab Results   Component Value Date    BP5QSCRL 63.4 12/20/2018    ABSOLUTECD3 297 (L) 12/20/2018    MY4SCNJN 10.7 12/20/2018    ABSOLUTECD8 50.3 (L) 12/20/2018    BP7TXPOX 51.9 12/20/2018    ABSOLUTECD4 243 (L) 12/20/2018    LABCD48 4.83 (H) 12/20/2018     Lab Results   Component Value Date    WBC 6.96 04/29/2019    RBC 5.64 04/29/2019    HGB 17.3 04/29/2019    HCT 51.3 04/29/2019    MCV 91 04/29/2019    MCH 30.7 04/29/2019    MCHC 33.7 04/29/2019    RDW 12.7 04/29/2019     04/29/2019    MPV 9.9 04/29/2019    GRAN 5.2 04/29/2019    GRAN 74.0 (H) 04/29/2019    LYMPH 0.9 (L) 04/29/2019    LYMPH 12.2 (L) 04/29/2019    MONO 0.5 04/29/2019    MONO 7.0 04/29/2019    EOS 0.3 04/29/2019    BASO 0.06 04/29/2019    EOSINOPHIL 4.6 04/29/2019    BASOPHIL 0.9 04/29/2019       Chemistry        Component Value Date/Time     04/04/2019 1441    K 4.8 04/04/2019 1441      04/04/2019 1441    CO2 27 04/04/2019 1441    BUN 7 04/04/2019 1441    CREATININE 0.8 04/04/2019 1441    GLU 95 04/04/2019 1441        Component Value Date/Time    CALCIUM 10.3 04/04/2019 1441    ALKPHOS 84 06/04/2019 1352    AST 27 06/04/2019 1352    ALT 45 (H) 06/04/2019 1352    BILITOT 0.6 06/04/2019 1352    ESTGFRAFRICA >60.0 04/04/2019 1441    EGFRNONAA >60.0 04/04/2019 1441              Diagnosis/Assessment/Plan:   1. Multiple sclerosis  · Assessment: Diagnosed 2/2018 based on clinical history, MRI, and CSF studies. Initially started on Tecfidera, but developed severe lymphopenia. Transitioned to Copaxone.   · Imaging: MRI brain and c-spine due at this time  · Labs: CBC and CMP for monitoring high risk safety medications.   · DMT: continue Copaxone  · The the patient was counseled about the importance of vitamin D supplementation in multiple sclerosis, and the fact that recent data suggests that high circulating blood levels of vitamin D has an ameliorating effect on the disease course in multiple sclerosis in general. Repeat vit D level today. Continue supplementation      2. MS Symptomatic management:  · Fatigue/sleep: will obtain sleep study to eval for inna and eval and treat insomnia. Continue modafinil 100- 200mg daily.  · Visual symptoms: seen by ophthalmology 12/2018, due 12/2019  · Mood disorder: improved with cymbalta, prescribed by another provider  · Pain: on lyrica and cymbalta, prescribed by another provider. Pain under fair control  · Gait disturbance: appears stable at this time. No falls.  · Restless leg: continue requip, prescribed by another provider      RTC in 6 months with me      Over 50% of this 30 minute visit was spent in direct face to face counseling of the patient about MS, DMT management, and MS symptom management.         There are no diagnoses linked to this encounter.

## 2020-03-04 ENCOUNTER — TELEPHONE (OUTPATIENT)
Dept: NEUROLOGY | Facility: CLINIC | Age: 50
End: 2020-03-04

## 2020-03-04 NOTE — TELEPHONE ENCOUNTER
Attempted to contact pt to schedule labs&MRIs this month, along with sleep medicine referral and six month follow up with Dr. Mills. Left VM requesting call back.

## 2020-03-05 ENCOUNTER — PATIENT OUTREACH (OUTPATIENT)
Dept: ADMINISTRATIVE | Facility: OTHER | Age: 50
End: 2020-03-05

## 2020-03-09 ENCOUNTER — TELEPHONE (OUTPATIENT)
Dept: PAIN MEDICINE | Facility: CLINIC | Age: 50
End: 2020-03-09

## 2020-03-09 ENCOUNTER — TELEPHONE (OUTPATIENT)
Dept: PHARMACY | Facility: CLINIC | Age: 50
End: 2020-03-09

## 2020-03-11 ENCOUNTER — TELEPHONE (OUTPATIENT)
Dept: PHARMACY | Facility: CLINIC | Age: 50
End: 2020-03-11

## 2020-03-16 NOTE — TELEPHONE ENCOUNTER
RX call attempt 2 regarding Copaxone refill from [facility]. Patient reached-- shipping out 3/18 for 3/19 arrival with patients consent. Copay of 0.00 @ 004. Patient stated he was not in need of any refills with the medication shipment. Address and  confirmed. Patient has and 6 days of medication on hand at this time. Patient has not started any new medications, has had no missed doses and no side effects present. Patient is currently taking the medication as directed by doctors instruction, Inject 1 mL (20 mg total) into the skin once daily. Patient does have a safe place in their residence to keep medication at desired temperature away from small children and pets. Patient also does have the capability of contacting 911 in the event of an emergency. Patient states they do not have any questions or concerns at this time.

## 2020-03-18 RX ORDER — ZOLPIDEM TARTRATE 10 MG/1
10 TABLET ORAL NIGHTLY PRN
Qty: 30 TABLET | Refills: 1 | Status: SHIPPED | OUTPATIENT
Start: 2020-03-18 | End: 2020-06-22 | Stop reason: SDUPTHER

## 2020-03-23 ENCOUNTER — DOCUMENTATION ONLY (OUTPATIENT)
Dept: NEUROLOGY | Facility: CLINIC | Age: 50
End: 2020-03-23

## 2020-03-27 ENCOUNTER — PATIENT MESSAGE (OUTPATIENT)
Dept: INTERNAL MEDICINE | Facility: CLINIC | Age: 50
End: 2020-03-27

## 2020-03-30 RX ORDER — PREGABALIN 150 MG/1
150 CAPSULE ORAL 2 TIMES DAILY
Qty: 60 CAPSULE | Refills: 1 | OUTPATIENT
Start: 2020-03-30 | End: 2020-09-28

## 2020-03-30 RX ORDER — ZOLPIDEM TARTRATE 10 MG/1
10 TABLET ORAL NIGHTLY PRN
Qty: 30 TABLET | Refills: 1 | OUTPATIENT
Start: 2020-03-30

## 2020-04-14 ENCOUNTER — TELEPHONE (OUTPATIENT)
Dept: PHARMACY | Facility: CLINIC | Age: 50
End: 2020-04-14

## 2020-04-14 DIAGNOSIS — G35 MULTIPLE SCLEROSIS: ICD-10-CM

## 2020-04-14 RX ORDER — GLATIRAMER ACETATE 20 MG/ML
20 INJECTION, SOLUTION SUBCUTANEOUS DAILY
Qty: 30 SYRINGE | Refills: 5 | Status: SHIPPED | OUTPATIENT
Start: 2020-04-14 | End: 2020-05-01 | Stop reason: DRUGHIGH

## 2020-04-30 ENCOUNTER — TELEPHONE (OUTPATIENT)
Dept: PHARMACY | Facility: CLINIC | Age: 50
End: 2020-04-30

## 2020-04-30 NOTE — TELEPHONE ENCOUNTER
Call for Copaxone follow up. Patient answered, verified name and . He states that he is currently driving and would like to schedule for tomorrow at 11am to do follow up.     Andrade López, PharmD  Clinical Pharmacist  [facility]  P: [phone]

## 2020-05-01 DIAGNOSIS — G35 MULTIPLE SCLEROSIS: Primary | ICD-10-CM

## 2020-05-01 NOTE — TELEPHONE ENCOUNTER
"----- Message from Andrade López PharmD sent at 5/1/2020 12:11 PM CDT -----  Regarding: Copaxone 3x weekly injection possibility  Good afternoon,    During a routine refill call, Mr. Haywood states that he is having a difficult time remembering to inject his Copaxone every day and says that he is "running out" of sites to inject. Would it be possible to send a prescription for to Copaxone 40mg 3x weekly injection to attempt to reduce his injection burden? Please advise.     Regards,    Andrade López, PharmD  Clinical Pharmacist  [facility]  P: [phone]    "

## 2020-05-01 NOTE — TELEPHONE ENCOUNTER
"Call for follow up for Copaxone. Patient has been taking medication "fairly regularly", having missed 3-4 doses in the past month. He states that it is difficult to do his shots every day as he "runs out of places to give the shot". He also states that it makes his skin tougher and more difficult to inject the next time he rotates to the site. Informed him that we could attempt to get him approved for the 3x weekly Copaxone. He states that this would make it much easier on him to remember to take his medication and reduce needle burden. He states that he has pain associated with his MS, although he was not able to give a number value to it. He states that it is constant and is worse in the mornings. He states that he may be getting on medicare soon so his coverage might change. Advised him to be sure that his medicare coverage includes prescription coverage of some sort. He acknowledged this. Advised him to let [facility] know when/if his coverage changes so we can get his new insurance info. He rates his QoL a 5/10.    Andrade López, PharmD  Clinical Pharmacist  [facility]  P: [phone]    "

## 2020-05-05 RX ORDER — GLATIRAMER 40 MG/ML
40 INJECTION, SOLUTION SUBCUTANEOUS
Qty: 12 ML | Refills: 5 | Status: SHIPPED | OUTPATIENT
Start: 2020-05-06 | End: 2020-12-01 | Stop reason: SDUPTHER

## 2020-05-06 ENCOUNTER — TELEPHONE (OUTPATIENT)
Dept: PHARMACY | Facility: CLINIC | Age: 50
End: 2020-05-06

## 2020-05-06 NOTE — TELEPHONE ENCOUNTER
DOCUMENTATION ONLY:  Prior authorization for Copaxone 40 mg/mL #12/28 approved from 05/06/2020 to 05/06/2021  Case ID: [id number]    Co-pay: $0.00    Patient assistance IS NOT required. Sending to clinical pharmacist for  and shipment. - JUAN JOSÉ

## 2020-05-06 NOTE — TELEPHONE ENCOUNTER
Call to patient to inform him that Copaxone three-times weekly dosing had been approved and to review dosing schedule. Reviewed strategy for shifting days of injection in case of missed administration. Patient acknowledged plan. Encouraged patient to reach out to [facility] with any questions or concerns.     Andrade López, PharmD  Clinical Pharmacist  [facility]  P: [phone]

## 2020-05-23 ENCOUNTER — HOSPITAL ENCOUNTER (EMERGENCY)
Facility: HOSPITAL | Age: 50
Discharge: HOME | End: 2020-05-23
Attending: EMERGENCY MEDICINE
Payer: MEDICAID

## 2020-05-23 VITALS
OXYGEN SATURATION: 97 % | DIASTOLIC BLOOD PRESSURE: 68 MMHG | TEMPERATURE: 99 F | BODY MASS INDEX: 27.31 KG/M2 | WEIGHT: 160 LBS | SYSTOLIC BLOOD PRESSURE: 103 MMHG | HEIGHT: 64 IN | RESPIRATION RATE: 18 BRPM | HEART RATE: 89 BPM

## 2020-05-23 DIAGNOSIS — G35 MULTIPLE SCLEROSIS EXACERBATION: Primary | ICD-10-CM

## 2020-05-23 PROCEDURE — 25000003 PHARM REV CODE 250: Performed by: NURSE PRACTITIONER

## 2020-05-23 PROCEDURE — 99283 EMERGENCY DEPT VISIT LOW MDM: CPT

## 2020-05-23 RX ORDER — OXYCODONE AND ACETAMINOPHEN 10; 325 MG/1; MG/1
1 TABLET ORAL
Status: COMPLETED | OUTPATIENT
Start: 2020-05-23 | End: 2020-05-23

## 2020-05-23 RX ORDER — OXYCODONE AND ACETAMINOPHEN 5; 325 MG/1; MG/1
1 TABLET ORAL EVERY 6 HOURS PRN
Qty: 12 TABLET | Refills: 0 | Status: SHIPPED | OUTPATIENT
Start: 2020-05-23 | End: 2020-06-22 | Stop reason: CLARIF

## 2020-05-23 RX ADMIN — OXYCODONE HYDROCHLORIDE AND ACETAMINOPHEN 1 TABLET: 10; 325 TABLET ORAL at 01:05

## 2020-05-23 NOTE — ED PROVIDER NOTES
Encounter Date: 5/23/2020       History     Chief Complaint   Patient presents with    Generalized Body Aches     Generalized aches to hands, feet, neck and back. Unrelieved by lyrica and other meds x 2 days. Hx of MS. Denies fever, cough, chills, or SOB     49 year old male with complaint of generalized pain in hands and lower legs X 3 days.  Reports history of MS and having exacerbation. Denies weakness. Denies fever or chills. Reports that he needs pain medication.         Review of patient's allergies indicates:   Allergen Reactions    Cat/feline products     Chocolate flavor Itching    Dog hair standardized allergenic extract      Past Medical History:   Diagnosis Date    Neuromuscular disorder     MS     Past Surgical History:   Procedure Laterality Date    TONSILLECTOMY       Family History   Problem Relation Age of Onset    Hypertension Mother     COPD Mother     Heart disease Father     Diabetes Sister     Stroke Brother      Social History     Tobacco Use    Smoking status: Current Some Day Smoker     Packs/day: 1.00     Years: 32.00     Pack years: 32.00     Types: Cigarettes    Smokeless tobacco: Never Used   Substance Use Topics    Alcohol use: Yes     Comment: seldom    Drug use: No     Review of Systems   Constitutional: Negative for fever.   HENT: Negative for sore throat.    Respiratory: Negative for shortness of breath.    Cardiovascular: Negative for chest pain.   Gastrointestinal: Negative for nausea.   Genitourinary: Negative for dysuria.   Musculoskeletal: Positive for arthralgias and myalgias. Negative for back pain.   Skin: Negative for rash.   Neurological: Negative for weakness.   Hematological: Does not bruise/bleed easily.       Physical Exam     Initial Vitals [05/23/20 1311]   BP Pulse Resp Temp SpO2   103/68 89 18 98.7 °F (37.1 °C) 97 %      MAP       --         Physical Exam    Nursing note and vitals reviewed.  Constitutional: He appears well-developed and  well-nourished.   HENT:   Head: Normocephalic and atraumatic.   Eyes: Conjunctivae are normal. Pupils are equal, round, and reactive to light.   Neck: Normal range of motion. Neck supple.   Cardiovascular: Normal rate, regular rhythm, normal heart sounds and intact distal pulses.   Pulmonary/Chest: Breath sounds normal.   Abdominal: Soft. There is no rebound and no guarding.   Musculoskeletal: Normal range of motion.   Neurological: He is alert.   Skin: Skin is warm and dry.   Psychiatric: He has a normal mood and affect. His behavior is normal. Thought content normal.         ED Course   Procedures  Labs Reviewed - No data to display       Imaging Results    None                                          Clinical Impression:       ICD-10-CM ICD-9-CM   1. Multiple sclerosis exacerbation G35 340             ED Disposition Condition    Discharge Stable        ED Prescriptions     Medication Sig Dispense Start Date End Date Auth. Provider    oxyCODONE-acetaminophen (PERCOCET) 5-325 mg per tablet Take 1 tablet by mouth every 6 (six) hours as needed for Pain. 12 tablet 5/23/2020  Eitan Lombardi NP        Follow-up Information     Follow up With Specialties Details Why Contact Info    Angel Simon MD Family Medicine Schedule an appointment as soon as possible for a visit  As needed [address] [facility] [address] [phone]                                       Eitan Lombardi NP  05/23/20 2931

## 2020-05-28 ENCOUNTER — TELEPHONE (OUTPATIENT)
Dept: PHARMACY | Facility: CLINIC | Age: 50
End: 2020-05-28

## 2020-06-08 ENCOUNTER — TELEPHONE (OUTPATIENT)
Dept: PHARMACY | Facility: CLINIC | Age: 50
End: 2020-06-08

## 2020-06-08 NOTE — TELEPHONE ENCOUNTER
Refill call regarding Copaxone at [facility]. Will prepare for shipment with consent of patient on 6/10 to arrive . Copay 0.00. Patient has not started any new medications including OTC drugs. Patient has not had any medication/ dose or instruction changes. No new allergies or side effects reported with this shipment. Medication is being taken as prescribed by physician and properly stored. Two patient identifiers:  and Address verified. Patient has no questions or concerns for RP. Patient has 3 injections on hand. Sharps added.

## 2020-06-20 ENCOUNTER — NURSE TRIAGE (OUTPATIENT)
Dept: ADMINISTRATIVE | Facility: CLINIC | Age: 50
End: 2020-06-20

## 2020-06-21 NOTE — TELEPHONE ENCOUNTER
Patient states he is in a lot of pain, has a history of MS. Taking prescribed medication with no relief. States pain is in legs, feet, hands, pretty much all over. Advised per protocol. Understanding verbalized.    Reason for Disposition   [1] SEVERE pain (e.g., excruciating, unable to do any normal activities) AND [2] not improved after 2 hours of pain medicine    Additional Information   Negative: Chest pain   Negative: Difficulty breathing   Negative: Entire foot is cool or blue in comparison to other side   Negative: Unable to walk   Negative: [1] Red area or streak AND [2] fever   Negative: [1] Swollen joint AND [2] fever   Negative: [1] Cast on leg or ankle AND [2] now increased pain   Negative: Patient sounds very sick or weak to the triager    Protocols used: LEG PAIN-A-AH

## 2020-06-22 ENCOUNTER — HOSPITAL ENCOUNTER (EMERGENCY)
Facility: HOSPITAL | Age: 50
Discharge: HOME | End: 2020-06-22
Attending: EMERGENCY MEDICINE
Payer: MEDICAID

## 2020-06-22 VITALS
OXYGEN SATURATION: 97 % | DIASTOLIC BLOOD PRESSURE: 78 MMHG | SYSTOLIC BLOOD PRESSURE: 117 MMHG | TEMPERATURE: 98 F | HEART RATE: 67 BPM | WEIGHT: 153.88 LBS | HEIGHT: 64 IN | RESPIRATION RATE: 16 BRPM | BODY MASS INDEX: 26.27 KG/M2

## 2020-06-22 DIAGNOSIS — G35 MULTIPLE SCLEROSIS EXACERBATION: Primary | ICD-10-CM

## 2020-06-22 DIAGNOSIS — R52 PAIN: ICD-10-CM

## 2020-06-22 DIAGNOSIS — R03.0 ELEVATED BLOOD PRESSURE READING: ICD-10-CM

## 2020-06-22 PROCEDURE — 25000003 PHARM REV CODE 250: Performed by: PHYSICIAN ASSISTANT

## 2020-06-22 PROCEDURE — 99283 EMERGENCY DEPT VISIT LOW MDM: CPT

## 2020-06-22 RX ORDER — OXYCODONE AND ACETAMINOPHEN 10; 325 MG/1; MG/1
1 TABLET ORAL EVERY 8 HOURS PRN
Qty: 9 TABLET | Refills: 0 | Status: SHIPPED | OUTPATIENT
Start: 2020-06-22 | End: 2020-06-22 | Stop reason: SDUPTHER

## 2020-06-22 RX ORDER — OXYCODONE AND ACETAMINOPHEN 10; 325 MG/1; MG/1
1 TABLET ORAL
Status: COMPLETED | OUTPATIENT
Start: 2020-06-22 | End: 2020-06-22

## 2020-06-22 RX ORDER — OXYCODONE AND ACETAMINOPHEN 10; 325 MG/1; MG/1
1 TABLET ORAL EVERY 8 HOURS PRN
Qty: 9 TABLET | Refills: 0 | Status: SHIPPED | OUTPATIENT
Start: 2020-06-22 | End: 2020-07-23 | Stop reason: SDUPTHER

## 2020-06-22 RX ADMIN — OXYCODONE HYDROCHLORIDE AND ACETAMINOPHEN 1 TABLET: 10; 325 TABLET ORAL at 12:06

## 2020-06-22 NOTE — ED PROVIDER NOTES
"   History      Chief Complaint   Patient presents with    body pain     Pt states, "I have had pain in my body and I just can't take it any more." PT has hx of MS       Review of patient's allergies indicates:   Allergen Reactions    Cat/feline products     Chocolate flavor Itching    Dog hair standardized allergenic extract         HPI   HPI    6/22/2020, 12:19 PM   History obtained from the patient      History of Present Illness: Logan Haywood is a 49 y.o. male patient who presents to the Emergency Department for pain flare up x 2-3 days.  Patient reports history of MS; denies recent injury and trauma.  Patient complains of pain of BLE and bilateral hands.  Patient declines imaging.  Patient reports that medication he was given at previous ED visit helped.  Patient was given Percocet.  Denies fever, vomiting, diarrhea, chest pain, SOB, headache, dizziness.   Patient reports that he will not be driving himself home that his ride is waiting for him outside.       Arrival mode: Personal vehicle      PCP: Angel Simon MD       Past Medical History:  Past Medical History:   Diagnosis Date    Neuromuscular disorder     MS       Past Surgical History:  Past Surgical History:   Procedure Laterality Date    TONSILLECTOMY           Family History:  Family History   Problem Relation Age of Onset    Hypertension Mother     COPD Mother     Heart disease Father     Diabetes Sister     Stroke Brother        Social History:  Social History     Tobacco Use    Smoking status: Current Some Day Smoker     Packs/day: 1.00     Years: 32.00     Pack years: 32.00     Types: Cigarettes    Smokeless tobacco: Never Used   Substance and Sexual Activity    Alcohol use: Yes     Comment: seldom    Drug use: No    Sexual activity: Not on file       ROS   Review of Systems   Constitutional: Negative for chills and fever.   HENT: Negative for congestion and rhinorrhea.    Eyes: Negative for discharge and redness. " "  Respiratory: Negative for cough and wheezing.    Cardiovascular: Negative for chest pain and palpitations.   Gastrointestinal: Negative for diarrhea, nausea and vomiting.   Genitourinary: Negative for dysuria and frequency.   Musculoskeletal: Positive for arthralgias and myalgias.   Skin: Negative for rash and wound.   Neurological: Negative for dizziness and headaches.       Physical Exam      Initial Vitals [06/22/20 1209]   BP Pulse Resp Temp SpO2   130/76 100 18 98 °F (36.7 °C) 99 %      MAP       --          Physical Exam  Nursing Notes and Vital Signs Reviewed.  Constitutional: Patient is in no apparent distress. Awake and alert. Well-developed and well-nourished.  Head: Atraumatic. Normocephalic.  Eyes: PERRL. EOM intact. Conjunctivae are not pale. No scleral icterus.  ENT: Mucous membranes are moist. Oropharynx is clear and symmetric.    Neck: Supple. Full ROM. No lymphadenopathy.  Cardiovascular: Regular rate. Regular rhythm. No murmurs, rubs, or gallops. Distal pulses are 2+ and symmetric.  Pulmonary/Chest: No respiratory distress. Clear to auscultation bilaterally. No wheezing, rales, or rhonchi.  Abdominal: Soft and non-distended.  There is no tenderness.  No rebound, guarding, or rigidity. Good bowel sounds.  Genitourinary: No CVA tenderness  Musculoskeletal: Moves all extremities. No obvious deformities. No edema. No calf tenderness.  Ambulates with cane.   Skin: Warm and dry.  Neurological:  Alert, awake, and appropriate.  Normal speech.  No acute focal neurological deficits are appreciated.  Psychiatric: Normal affect. Good eye contact. Appropriate in content.    ED Course    Procedures  ED Vital Signs:  Vitals:    06/22/20 1209   BP: 130/76   Pulse: 100   Resp: 18   Temp: 98 °F (36.7 °C)   TempSrc: Oral   SpO2: 99%   Weight: 69.8 kg (153 lb 14.1 oz)   Height: 5' 4" (1.626 m)       Abnormal Lab Results:  Labs Reviewed - No data to display     All Lab Results:  None    Imaging Results:  Imaging " Results    None                 The Emergency Provider reviewed the vital signs and test results, which are outlined above.    ED Discussion     12:26 PM: Reassessed pt at this time.  Pt states his condition has improved at this time.  Recommend patient call PCP to determine if he can schedule an appointment sooner than July 7, 2020.  Discussed with pt all pertinent ED information and results. Discussed pt dx and plan of tx. Gave pt all f/u and return to the ED instructions. All questions and concerns were addressed at this time. Pt expresses understanding of information and instructions, and is comfortable with plan to discharge. Pt is stable for discharge.    Pre-hypertension/Hypertension: The pt has been informed that they may have pre-hypertension or hypertension based on a blood pressure reading in the ED. I recommend that the pt call the PCP listed on their discharge instructions or a physician of their choice this week to arrange f/u for further evaluation of possible pre-hypertension or hypertension.     I discussed with patient and/or family/caretaker that evaluation in the ED does not suggest any emergent or life threatening medical conditions requiring immediate intervention beyond what was provided in the ED, and I believe patient is safe for discharge.  Regardless, an unremarkable evaluation in the ED does not preclude the development or presence of a serious of life threatening condition. As such, patient was instructed to return immediately for any worsening or change in current symptoms.      ED Medication(s):  Medications   oxyCODONE-acetaminophen  mg per tablet 1 tablet (has no administration in time range)       New Prescriptions    OXYCODONE-ACETAMINOPHEN (PERCOCET)  MG PER TABLET    Take 1 tablet by mouth every 8 (eight) hours as needed for Pain.       Follow-up Information     Angel Simon MD. Schedule an appointment as soon as possible for a visit in 3 days.    Specialty:  Family Medicine  Contact information:  [address] [facility] [address] [phone]                     Medical Decision Making             checked and patient has one previous prescription for Percocet in 2020.       Clinical Impression       ICD-10-CM ICD-9-CM   1. Multiple sclerosis exacerbation  G35 340   2. Pain  R52 780.96   3. Elevated blood pressure reading  R03.0 796.2       Disposition:   Disposition: Discharged  Condition: Stable           Elena Hanson PA-C  06/22/20 2029       Elena Hanson PA-C  06/23/20 0902

## 2020-06-23 ENCOUNTER — PATIENT OUTREACH (OUTPATIENT)
Dept: ADMINISTRATIVE | Facility: HOSPITAL | Age: 50
End: 2020-06-23

## 2020-07-07 ENCOUNTER — TELEPHONE (OUTPATIENT)
Dept: INTERNAL MEDICINE | Facility: CLINIC | Age: 50
End: 2020-07-07

## 2020-07-07 ENCOUNTER — TELEPHONE (OUTPATIENT)
Dept: PHARMACY | Facility: CLINIC | Age: 50
End: 2020-07-07

## 2020-07-07 NOTE — TELEPHONE ENCOUNTER
----- Message from Tonia Kasper sent at 7/6/2020  6:28 PM CDT -----  Regarding: Patient called  Requesting Call back:     Who Called : Logan   Reason of call:  patient called to reschedule for a later time due to transportation    Best contact number:  [phone]   Additional information:  no availability sooner until Nov-2020

## 2020-07-14 ENCOUNTER — TELEPHONE (OUTPATIENT)
Dept: PHARMACY | Facility: CLINIC | Age: 50
End: 2020-07-14

## 2020-07-23 ENCOUNTER — OFFICE VISIT (OUTPATIENT)
Dept: INTERNAL MEDICINE | Facility: CLINIC | Age: 50
End: 2020-07-23
Payer: MEDICAID

## 2020-07-23 VITALS
HEIGHT: 64 IN | BODY MASS INDEX: 25.75 KG/M2 | RESPIRATION RATE: 18 BRPM | OXYGEN SATURATION: 99 % | HEART RATE: 86 BPM | WEIGHT: 150.81 LBS | DIASTOLIC BLOOD PRESSURE: 62 MMHG | TEMPERATURE: 99 F | SYSTOLIC BLOOD PRESSURE: 108 MMHG

## 2020-07-23 DIAGNOSIS — G35 MS (MULTIPLE SCLEROSIS): ICD-10-CM

## 2020-07-23 DIAGNOSIS — G47.00 INSOMNIA, UNSPECIFIED TYPE: ICD-10-CM

## 2020-07-23 DIAGNOSIS — G25.81 RESTLESS LEG: ICD-10-CM

## 2020-07-23 DIAGNOSIS — G89.29 OTHER CHRONIC PAIN: Primary | ICD-10-CM

## 2020-07-23 PROCEDURE — 99214 PR OFFICE/OUTPT VISIT, EST, LEVL IV, 30-39 MIN: ICD-10-PCS | Mod: S$PBB,,, | Performed by: FAMILY MEDICINE

## 2020-07-23 PROCEDURE — 99999 PR PBB SHADOW E&M-EST. PATIENT-LVL IV: ICD-10-PCS | Mod: PBBFAC,,, | Performed by: FAMILY MEDICINE

## 2020-07-23 PROCEDURE — 99214 OFFICE O/P EST MOD 30 MIN: CPT | Mod: S$PBB,,, | Performed by: FAMILY MEDICINE

## 2020-07-23 PROCEDURE — 99999 PR PBB SHADOW E&M-EST. PATIENT-LVL IV: CPT | Mod: PBBFAC,,, | Performed by: FAMILY MEDICINE

## 2020-07-23 PROCEDURE — 99214 OFFICE O/P EST MOD 30 MIN: CPT | Mod: PBBFAC | Performed by: FAMILY MEDICINE

## 2020-07-23 RX ORDER — OXYCODONE AND ACETAMINOPHEN 10; 325 MG/1; MG/1
1 TABLET ORAL EVERY 8 HOURS PRN
Qty: 30 TABLET | Refills: 0 | OUTPATIENT
Start: 2020-07-23 | End: 2020-08-06

## 2020-07-23 RX ORDER — DULOXETIN HYDROCHLORIDE 60 MG/1
60 CAPSULE, DELAYED RELEASE ORAL DAILY
Qty: 90 CAPSULE | Refills: 1 | Status: SHIPPED | OUTPATIENT
Start: 2020-07-23 | End: 2021-07-23

## 2020-07-23 NOTE — PATIENT INSTRUCTIONS
Dr. Simon Instructions:    Lets plan on doing a wellness / annual exam at our next visit.  Please come FASTING so we can do your lab work.

## 2020-07-23 NOTE — PROGRESS NOTES
Subjective:       Patient ID: Logan Haywood is a 49 y.o. male.    Chief Complaint: Leg Pain (pt complains of pain in his feet legs and hands.)    HPI     50 yo M    Past Medical History:   Diagnosis Date    Neuromuscular disorder     MS     Past Surgical History:   Procedure Laterality Date    TONSILLECTOMY       Social History     Tobacco Use   Smoking Status Current Some Day Smoker    Packs/day: 1.00    Years: 32.00    Pack years: 32.00    Types: Cigarettes   Smokeless Tobacco Never Used     Family History   Problem Relation Age of Onset    Hypertension Mother     COPD Mother     Heart disease Father     Diabetes Sister     Stroke Brother      Chronic pain  Constant pain.  Everywhere.  Feet , legs,   Feels stepping on glass all the time.    Left leg is giving out on him.    Hands/ shoulders - neck    sometimes feels too much.    Cannot sleep because of pain  Ambien only works for 2-3 hours.    He cannot function.    Pain medicine does help.  Percocet given at ER.  It did help him.      Bad few months  His mother passed away    Review of Systems   Constitutional: Negative for activity change and unexpected weight change.   HENT: Negative for hearing loss, rhinorrhea and trouble swallowing.    Eyes: Negative for discharge and visual disturbance.   Respiratory: Negative for chest tightness and wheezing.    Cardiovascular: Negative for chest pain and palpitations.   Gastrointestinal: Positive for constipation. Negative for blood in stool, diarrhea and vomiting.   Endocrine: Negative for polydipsia and polyuria.   Genitourinary: Negative for difficulty urinating, hematuria and urgency.   Musculoskeletal: Positive for arthralgias and neck pain. Negative for joint swelling.   Neurological: Positive for weakness and headaches.   Psychiatric/Behavioral: Positive for confusion and dysphoric mood.       Objective:       Vitals:    07/23/20 0920   BP: 108/62   Pulse: 86   Resp: 18   Temp: 99.1 °F (37.3 °C)        Physical Exam  Constitutional:       General: He is not in acute distress.     Appearance: Normal appearance. He is well-developed.   HENT:      Head: Normocephalic and atraumatic.   Eyes:      General:         Right eye: No discharge.         Left eye: No discharge.      Conjunctiva/sclera: Conjunctivae normal.   Neck:      Musculoskeletal: Full passive range of motion without pain.      Trachea: Trachea normal.   Cardiovascular:      Rate and Rhythm: Normal rate and regular rhythm.      Heart sounds: Normal heart sounds.   Pulmonary:      Effort: Pulmonary effort is normal. No respiratory distress.      Breath sounds: Normal breath sounds. No decreased breath sounds or wheezing.   Abdominal:      Palpations: Abdomen is soft.      Tenderness: There is no abdominal tenderness.   Musculoskeletal:         General: No deformity.   Lymphadenopathy:      Cervical: No cervical adenopathy.   Skin:     Coloration: Skin is not pale.   Neurological:      Mental Status: He is alert and oriented to person, place, and time.   Psychiatric:         Speech: Speech normal.         Behavior: Behavior normal.         Thought Content: Thought content normal.         Assessment:       1. Other chronic pain    2. MS (multiple sclerosis)    3. Restless leg    4. Insomnia, unspecified type        Plan:    chronic pain  -     Ambulatory referral/consult to Pain Clinic; Future; Expected date: 07/30/2020    MS (multiple sclerosis)  -     Ambulatory referral/consult to Pain Clinic; Future; Expected date: 07/30/2020    requips helps at night  No longer taking the lyrica - feels it does not help.  Given his severe pain and its negative impact on his overall sleep and wellbing I will refill a 1 month supply of percocet as it has helped.  I do not plan on, and will not refill after this.  Consult placed to pain management.     Encouraged to continue cymbalta.    MS  Getting copaxone injections  Causing discomfort  Hard to say if  helps.    Insomnia  Feels doesn't sleep due to pain  Given prescribing percocet - I do not plan on refilling ambien. Control pain in effort to help sleep.      F/u in 4 months - plan on wellness visit / lab work.

## 2020-08-06 ENCOUNTER — HOSPITAL ENCOUNTER (EMERGENCY)
Facility: HOSPITAL | Age: 50
Discharge: HOME | End: 2020-08-06
Attending: EMERGENCY MEDICINE
Payer: MEDICARE

## 2020-08-06 VITALS
WEIGHT: 150 LBS | SYSTOLIC BLOOD PRESSURE: 133 MMHG | BODY MASS INDEX: 25.61 KG/M2 | TEMPERATURE: 98 F | RESPIRATION RATE: 18 BRPM | DIASTOLIC BLOOD PRESSURE: 70 MMHG | HEART RATE: 82 BPM | OXYGEN SATURATION: 99 % | HEIGHT: 64 IN

## 2020-08-06 DIAGNOSIS — G35 MULTIPLE SCLEROSIS: Primary | ICD-10-CM

## 2020-08-06 DIAGNOSIS — G89.29 OTHER CHRONIC PAIN: ICD-10-CM

## 2020-08-06 PROCEDURE — 99283 EMERGENCY DEPT VISIT LOW MDM: CPT

## 2020-08-06 RX ORDER — OXYCODONE AND ACETAMINOPHEN 10; 325 MG/1; MG/1
1 TABLET ORAL EVERY 4 HOURS PRN
Qty: 10 TABLET | Refills: 0 | Status: SHIPPED | OUTPATIENT
Start: 2020-08-06

## 2020-08-06 NOTE — ED PROVIDER NOTES
Encounter Date: 8/6/2020       History     Chief Complaint   Patient presents with    generalized pain     pt states he has MS and has not been able to get into pain mgmt and is hurting all over     49 year old male with complaint of generalized pain in arms, legs, and feet over the past few weeks with recent worsening.  Pt reports he is having MS related pain and is waiting to see pain management.  Pt reports PCP prescribed Percocet but he ran out.  No other complaints.         Review of patient's allergies indicates:   Allergen Reactions    Cat/feline products     Chocolate flavor Itching    Dog hair standardized allergenic extract      Past Medical History:   Diagnosis Date    Neuromuscular disorder     MS     Past Surgical History:   Procedure Laterality Date    TONSILLECTOMY       Family History   Problem Relation Age of Onset    Hypertension Mother     COPD Mother     Heart disease Father     Diabetes Sister     Stroke Brother      Social History     Tobacco Use    Smoking status: Current Some Day Smoker     Packs/day: 1.00     Years: 32.00     Pack years: 32.00     Types: Cigarettes    Smokeless tobacco: Never Used   Substance Use Topics    Alcohol use: Not Currently     Frequency: Never     Drinks per session: Patient refused     Binge frequency: Never     Comment: seldom    Drug use: No     Review of Systems   Constitutional: Negative for fever.   HENT: Negative for sore throat.    Respiratory: Negative for shortness of breath.    Cardiovascular: Negative for chest pain.   Gastrointestinal: Negative for nausea.   Genitourinary: Negative for dysuria.   Musculoskeletal: Negative for back pain.        Arm pain, leg pain, feet pain    Skin: Negative for rash.   Neurological: Negative for weakness.   Hematological: Does not bruise/bleed easily.       Physical Exam     Initial Vitals [08/06/20 1133]   BP Pulse Resp Temp SpO2   133/70 82 18 97.9 °F (36.6 °C) 99 %      MAP       --         Physical  Exam    Nursing note and vitals reviewed.  Constitutional: He appears well-developed and well-nourished.   HENT:   Head: Normocephalic and atraumatic.   Eyes: Conjunctivae are normal. Pupils are equal, round, and reactive to light.   Neck: Normal range of motion. Neck supple.   Cardiovascular: Normal rate, regular rhythm, normal heart sounds and intact distal pulses.   Pulmonary/Chest: Breath sounds normal.   Abdominal: Soft. There is no rebound and no guarding.   Musculoskeletal: Normal range of motion.   Neurological: He is alert.   Skin: Skin is warm and dry.   Psychiatric: He has a normal mood and affect. His behavior is normal. Thought content normal.         ED Course   Procedures  Labs Reviewed - No data to display       Imaging Results    None                                          Clinical Impression:       ICD-10-CM ICD-9-CM   1. Multiple sclerosis  G35 340   2. Other chronic pain  G89.29 338.29             ED Disposition Condition    Discharge Stable        ED Prescriptions     Medication Sig Dispense Start Date End Date Auth. Provider    oxyCODONE-acetaminophen (PERCOCET)  mg per tablet Take 1 tablet by mouth every 4 (four) hours as needed for Pain. 10 tablet 8/6/2020  Eitan Lombardi NP        Follow-up Information     Follow up With Specialties Details Why Contact Info    Angel Simon MD Family Medicine Schedule an appointment as soon as possible for a visit in 2 days  [address] [facility] [address] [phone]                                       Eitan Lombardi NP  08/06/20 6214

## 2020-08-06 NOTE — TELEPHONE ENCOUNTER
Call Attempt  #1 to reach patient LVM with female and on voicemail regarding Copaxone. MyChart sent.   
no

## 2020-08-10 DIAGNOSIS — G25.81 RESTLESS LEG: ICD-10-CM

## 2020-08-10 RX ORDER — ROPINIROLE 0.5 MG/1
TABLET, FILM COATED ORAL
Qty: 180 TABLET | Refills: 1 | Status: SHIPPED | OUTPATIENT
Start: 2020-08-10 | End: 2020-11-16 | Stop reason: SDUPTHER

## 2020-08-10 NOTE — PROGRESS NOTES
Refill Routing Note   Medication(s) are not appropriate for processing by [facility]:       - Outside of protocol           Medication reconciliation completed: No      Automatic Epic Generated Protocol Data:        Requested Prescriptions   Pending Prescriptions Disp Refills    rOPINIRole (REQUIP) 0.5 MG tablet [Pharmacy Med Name: ROPINIROLE 0.5MG TABLETS] 180 tablet 1     Sig: TAKE 1 TABLET(0.5 MG) BY MOUTH THREE TIMES DAILY       There is no refill protocol information for this order           Appointments  past 12m or future 3m with PCP    Date Provider   Last Visit   7/23/2020 Angel Simon MD   Next Visit   Visit date not found Angel Simon MD   ED visits in past 90 days: 3     Note composed:10:36 AM 08/10/2020

## 2020-08-11 ENCOUNTER — PATIENT OUTREACH (OUTPATIENT)
Dept: ADMINISTRATIVE | Facility: OTHER | Age: 50
End: 2020-08-11

## 2020-08-11 ENCOUNTER — TELEPHONE (OUTPATIENT)
Dept: PHARMACY | Facility: CLINIC | Age: 50
End: 2020-08-11

## 2020-08-11 RX ORDER — HYDROXYZINE PAMOATE 25 MG/1
CAPSULE ORAL
COMMUNITY
Start: 2020-08-07

## 2020-08-11 RX ORDER — SERTRALINE HYDROCHLORIDE 50 MG/1
TABLET, FILM COATED ORAL
COMMUNITY
Start: 2020-08-07

## 2020-08-11 NOTE — TELEPHONE ENCOUNTER
Patient reached for Copaxone followup. Patient has been on therapy for over a year - first on 20mg daily, then switching to 40mg three times a week. Current MS symptoms are unchanged and include pain, Vision - occasional diplopia (self-resolves). Numbness and tingling in hands and feet, Trouble with balance - No change in walking noticed and several past episodes of bowel incontinence; the most recent was within the past 3 months. Patient is not working - disabled. Flares in the past - 3 months ago - left leg gave out on him - self resolved within several hours - no steroids received. Support includes friend-group with MS that offer him advice. Patient is scheduled to see pain management MD tomorrow - this is his greatest complaint. Pain 8+ constantly. Reminded patient to schedule MRI. [place] closer to [place] - [place] sent. Going to counseling for addiction. Side effects continue to be limited to injection site reaction - small nodules under skin that take a week to go away. Redness and pain go away much more quickly. Uses a device to inject. Denies any missed doses or trouble injecting; confirms rotating sites.

## 2020-08-11 NOTE — PROGRESS NOTES
Requested updates within Care Everywhere.  Patient's chart was reviewed for overdue JOSHUA topics.  Immunizations reconciled.

## 2020-08-12 ENCOUNTER — OFFICE VISIT (OUTPATIENT)
Dept: PAIN MEDICINE | Facility: CLINIC | Age: 50
End: 2020-08-12
Payer: MEDICARE

## 2020-08-12 ENCOUNTER — TELEPHONE (OUTPATIENT)
Dept: PHARMACY | Facility: CLINIC | Age: 50
End: 2020-08-12

## 2020-08-12 VITALS
DIASTOLIC BLOOD PRESSURE: 89 MMHG | HEIGHT: 64 IN | HEART RATE: 84 BPM | SYSTOLIC BLOOD PRESSURE: 127 MMHG | BODY MASS INDEX: 26.05 KG/M2 | WEIGHT: 152.56 LBS

## 2020-08-12 DIAGNOSIS — G89.29 OTHER CHRONIC PAIN: ICD-10-CM

## 2020-08-12 DIAGNOSIS — M79.2 NEUROPATHIC PAIN: ICD-10-CM

## 2020-08-12 DIAGNOSIS — G89.4 PAIN AMPLIFICATION SYNDROME: Primary | ICD-10-CM

## 2020-08-12 DIAGNOSIS — G35 MS (MULTIPLE SCLEROSIS): ICD-10-CM

## 2020-08-12 PROCEDURE — 99999 PR PBB SHADOW E&M-EST. PATIENT-LVL IV: ICD-10-PCS | Mod: PBBFAC,,, | Performed by: PHYSICAL MEDICINE & REHABILITATION

## 2020-08-12 PROCEDURE — 99999 PR PBB SHADOW E&M-EST. PATIENT-LVL IV: CPT | Mod: PBBFAC,,, | Performed by: PHYSICAL MEDICINE & REHABILITATION

## 2020-08-12 PROCEDURE — 99214 OFFICE O/P EST MOD 30 MIN: CPT | Mod: PBBFAC | Performed by: PHYSICAL MEDICINE & REHABILITATION

## 2020-08-12 PROCEDURE — 99204 OFFICE O/P NEW MOD 45 MIN: CPT | Mod: S$PBB,,, | Performed by: PHYSICAL MEDICINE & REHABILITATION

## 2020-08-12 PROCEDURE — 99204 PR OFFICE/OUTPT VISIT, NEW, LEVL IV, 45-59 MIN: ICD-10-PCS | Mod: S$PBB,,, | Performed by: PHYSICAL MEDICINE & REHABILITATION

## 2020-08-12 RX ORDER — AMITRIPTYLINE HYDROCHLORIDE 100 MG/1
100 TABLET ORAL NIGHTLY
Qty: 30 TABLET | Refills: 11 | Status: SHIPPED | OUTPATIENT
Start: 2020-08-12 | End: 2021-08-12

## 2020-08-12 RX ORDER — TIZANIDINE 4 MG/1
4 TABLET ORAL 3 TIMES DAILY PRN
Qty: 90 TABLET | Refills: 2 | Status: SHIPPED | OUTPATIENT
Start: 2020-08-12 | End: 2020-11-10

## 2020-08-12 NOTE — LETTER
August 12, 2020      Angel Simon MD  [address] [facility] [address]           [facility]  [address] [place] [address]  Phone: [phone]  Fax: [phone]          Patient: Logan Haywood   MR Number: [id number]   YOB: 1970   Date of Visit: 8/12/2020       Dear Dr. Angel Simon:    Thank you for referring Logan Haywood to me for evaluation. Attached you will find relevant portions of my assessment and plan of care.    If you have questions, please do not hesitate to call me. I look forward to following Logan Haywood along with you.    Sincerely,    Javy Brunner MD    Enclosure  CC:  No Recipients    If you would like to receive this communication electronically, please contact [email or web address] or [phone] to request more information on [employer] access.    For providers and/or their staff who would like to refer a patient to [facility], please contact us through our one-stop-shop provider referral line, [facility], at [phone].    If you feel you have received this communication in error or would no longer like to receive these types of communications, please e-mail [email or web address]

## 2020-08-12 NOTE — PROGRESS NOTES
New Patient Chronic Pain Note (Initial Visit)    Referring Physician: Angel Simon MD    PCP: Angel Simon MD    Chief Complaint:   Chief Complaint   Patient presents with    Back Pain        SUBJECTIVE:    Logan Haywood is a 49 y.o. male who presents to the clinic for the evaluation of chronic pain secondary to multiple sclerosis.  He was referred by his primary care provider for further evaluation and management of this pain.  Of note, patient has a past medical history of multiple sclerosis, depression, cocaine abuse, constipation, tobacco abuse, multiple other medical comorbidities as listed below.  The pain started 2 years ago following his diagnosis of multiple sclerosis and symptoms have been unchanged.The pain is located in the bilateral hands and feet and he also reports pain in lower back.  The pain is described as Throbbing, aching, numbness, tingling and is rated as 10/10. The pain is rated with a score of  10/10 on the BEST day and a score of 10/10 on the WORST day.  Symptoms interfere with daily activity, sleep. The pain is exacerbated by nothing in particular as he reports pain is constant.  The pain is mitigated by nothing. The patient reports spending 4-6 hours per day reclining. The patient reports 6-8 hours of uninterrupted sleep per night.    Patient denies night fever/night sweats, urinary incontinence, bowel incontinence and significant weight loss.  He reports that he occasionally will have left leg weakness when his MS flares, but denies any recent falls.  Reports paresthesias in the hands and feet, denies any loss of sensation.    Pain Disability Index Review:  Last 3 PDI Scores 8/12/2020 5/19/2018   Pain Disability Index (PDI) 60 62       Non-Pharmacologic Treatments:  Physical Therapy/Home Exercise: yes  Ice/Heat:yes  TENS: no  Acupuncture: no  Massage: no  Chiropractic: no    Other: no      Pain Medications:  - Opioids: Percocet  - Adjuvant Medications: Cymbalta, Requip,  Zoloft, Ambien, Copaxone, Provigil, Lyrica, gabapentin, baclofen, Elavil  - Anti-Coagulants: None     report:  Reviewed and consistent with medication use as prescribed.        Pain Procedures:   Denies      Imaging:   MRI cervical spine 09/27/2018:  Patchy areas of signal abnormality in the cord can be seen at C2, C3, C5, C7 and T2.  Comparison with the previous examination dated 02/26/2018 shows similar findings.  No evidence of abnormal enhancement on this examination.  Focus of abnormal enhancement was seen at the C2 level on the prior study.     C2-C3: Minimal disc bulge.     C3-C4: Broad-based osteophyte and disc bulge.  Uncovertebral joint disease with moderate left-sided neural foraminal narrowing.     C4-C5: Broad-based disc bulge.  Bilateral degenerative changes of the facets.  Uncovertebral joint disease with mild neural foraminal narrowing.     C5-C6: Broad-based disc bulge.  Bilateral degenerative changes of the facets.  Moderate to severe neural foraminal narrowing greater on the right side.     C6-C7: Minimal disc bulge.     C7-T1: Normal.    MRI brain 09/27/2018:  Multiple foci of signal abnormality in the brain seen in subcortical locations in a periventricular distribution bilaterally similar to the prior study dated 02/26/2018 no diffusion weighted sequence abnormality. No mass lesion.     Ventricles and sulci are normal in size for age without evidence of hydrocephalus. No evidence of intra or extra-axial hemorrhage.     Normal vascular flow voids are preserved.     Sinuses are clear.     No evidence of abnormal enhancement post contrast material.     Bone marrow signal intensity is normal.    Past Medical History:   Diagnosis Date    Neuromuscular disorder     MS     Past Surgical History:   Procedure Laterality Date    TONSILLECTOMY       Social History     Socioeconomic History    Marital status: Single     Spouse name: Not on file    Number of children: Not on file    Years of  education: Not on file    Highest education level: Not on file   Occupational History    Not on file   Social Needs    Financial resource strain: Somewhat hard    Food insecurity     Worry: Sometimes true     Inability: Sometimes true    Transportation needs     Medical: Yes     Non-medical: Yes   Tobacco Use    Smoking status: Current Some Day Smoker     Packs/day: 1.00     Years: 32.00     Pack years: 32.00     Types: Cigarettes    Smokeless tobacco: Never Used   Substance and Sexual Activity    Alcohol use: Not Currently     Frequency: Never     Drinks per session: Patient refused     Binge frequency: Never     Comment: seldom    Drug use: No    Sexual activity: Not on file   Lifestyle    Physical activity     Days per week: 3 days     Minutes per session: 10 min    Stress: Very much   Relationships    Social connections     Talks on phone: More than three times a week     Gets together: Once a week     Attends [employer] service: Not on file     Active member of club or organization: No     Attends meetings of clubs or organizations: Patient refused     Relationship status: Never    Other Topics Concern    Not on file   Social History Narrative    Not on file     Family History   Problem Relation Age of Onset    Hypertension Mother     COPD Mother     Heart disease Father     Diabetes Sister     Stroke Brother        Review of patient's allergies indicates:   Allergen Reactions    Cat/feline products     Chocolate flavor Itching    Dog hair standardized allergenic extract        Current Outpatient Medications   Medication Sig    cholecalciferol, vitamin D3, 125 mcg (5,000 unit) capsule Take 1 capsule (5,000 Units total) by mouth once daily.    DULoxetine (CYMBALTA) 60 MG capsule Take 1 capsule (60 mg total) by mouth once daily.    glatiramer (COPAXONE) 40 mg/mL injection Inject 40 mg into the skin 3 (three) times a week.    hydrOXYzine pamoate (VISTARIL) 25 MG Cap      "multivitamin capsule Take 1 capsule by mouth once daily.    oxyCODONE-acetaminophen (PERCOCET)  mg per tablet Take 1 tablet by mouth every 4 (four) hours as needed for Pain.    rOPINIRole (REQUIP) 0.5 MG tablet TAKE 1 TABLET(0.5 MG) BY MOUTH THREE TIMES DAILY    sertraline (ZOLOFT) 50 MG tablet     zolpidem (AMBIEN) 10 mg Tab TAKE 1 TABLET(10 MG) BY MOUTH EVERY NIGHT AS NEEDED    amitriptyline (ELAVIL) 100 MG tablet Take 1 tablet (100 mg total) by mouth every evening.    tiZANidine (ZANAFLEX) 4 MG tablet Take 1 tablet (4 mg total) by mouth 3 (three) times daily as needed. Take 1/2 to 1 tab BID PRN muscle spasms. May cause drowsiness.     No current facility-administered medications for this visit.        Review of Systems     GENERAL:  No weight loss, malaise or fevers.  HEENT:   No recent changes in vision or hearing  NECK:  Negative for lumps, no difficulty with swallowing.  RESPIRATORY:  Negative for cough, wheezing or shortness of breath, patient denies any recent URI.  CARDIOVASCULAR:  Negative for chest pain, leg swelling or palpitations.  GI:  Negative for abdominal discomfort, blood in stools or black stools or change in bowel habits.  MUSCULOSKELETAL:  See HPI.  SKIN:  Negative for lesions, rash, and itching.  PSYCH:  No mood disorder or recent psychosocial stressors.  Patients sleep is not disturbed secondary to pain.  HEMATOLOGY/LYMPHOLOGY:  Negative for prolonged bleeding, bruising easily or swollen nodes.  Patient is not currently taking any anti-coagulants  NEURO:   No history of headaches, syncope, paralysis, seizures or tremors.  All other reviewed and negative other than HPI.    OBJECTIVE:    /89   Pulse 84   Ht 5' 4" (1.626 m)   Wt 69.2 kg (152 lb 8.9 oz)   BMI 26.19 kg/m²         Physical Exam    GENERAL: Well appearing, in no acute distress, alert and oriented x3.  PSYCH:  Mood and affect appropriate.  SKIN: Skin color, texture, turgor normal, no rashes or " lesions.  HEAD/FACE:  Normocephalic, atraumatic. Cranial nerves grossly intact.  CV: RRR with palpation of the radial artery.  PULM: No evidence of respiratory difficulty, symmetric chest rise.  GI:  Soft and non-tender.  BACK: Straight leg raising in the sitting and supine positions is equivocal to radicular pain.  Mild pain to palpation over the facet joints of the lumbar spine or spinous processes.  Limited range of motion secondary to pain reproduction.  EXTREMITIES: Peripheral joint ROM is full and pain free without obvious instability or laxity in all four extremities. No deformities, edema, or skin discoloration. Good capillary refill.  MUSCULOSKELETAL:    Bilateral upper and lower extremity strength is fair and symmetric.  Mild amount of tone in the bilateral upper and lower extremities.  NEURO: Bilateral upper and lower extremity coordination and muscle stretch reflexes are physiologic and symmetric.  Plantar response are downgoing. No clonus.  Negative Shahzad's.  Positive Lhermitte's  No loss of sensation is noted.  GAIT:  Antalgic, walks a single-point cane.      LABS:  Lab Results   Component Value Date    WBC 6.96 04/29/2019    HGB 17.3 04/29/2019    HCT 51.3 04/29/2019    MCV 91 04/29/2019     04/29/2019       CMP  Sodium   Date Value Ref Range Status   04/04/2019 142 136 - 145 mmol/L Final     Potassium   Date Value Ref Range Status   04/04/2019 4.8 3.5 - 5.1 mmol/L Final     Chloride   Date Value Ref Range Status   04/04/2019 104 95 - 110 mmol/L Final     CO2   Date Value Ref Range Status   04/04/2019 27 23 - 29 mmol/L Final     Glucose   Date Value Ref Range Status   04/04/2019 95 70 - 110 mg/dL Final     BUN, Bld   Date Value Ref Range Status   04/04/2019 7 6 - 20 mg/dL Final     Creatinine   Date Value Ref Range Status   04/04/2019 0.8 0.5 - 1.4 mg/dL Final     Calcium   Date Value Ref Range Status   04/04/2019 10.3 8.7 - 10.5 mg/dL Final     Total Protein   Date Value Ref Range Status    06/04/2019 7.1 6.0 - 8.4 g/dL Final     Albumin   Date Value Ref Range Status   06/04/2019 4.0 3.5 - 5.2 g/dL Final     Total Bilirubin   Date Value Ref Range Status   06/04/2019 0.6 0.1 - 1.0 mg/dL Final     Comment:     For infants and newborns, interpretation of results should be based  on gestational age, weight and in agreement with clinical  observations.  Premature Infant recommended reference ranges:  Up to 24 hours.............<8.0 mg/dL  Up to 48 hours............<12.0 mg/dL  3-5 days..................<15.0 mg/dL  6-29 days.................<15.0 mg/dL       Alkaline Phosphatase   Date Value Ref Range Status   06/04/2019 84 55 - 135 U/L Final     AST   Date Value Ref Range Status   06/04/2019 27 10 - 40 U/L Final     ALT   Date Value Ref Range Status   06/04/2019 45 (H) 10 - 44 U/L Final     Anion Gap   Date Value Ref Range Status   04/04/2019 11 8 - 16 mmol/L Final     eGFR if    Date Value Ref Range Status   04/04/2019 >60.0 >60 mL/min/1.73 m^2 Final     eGFR if non    Date Value Ref Range Status   04/04/2019 >60.0 >60 mL/min/1.73 m^2 Final     Comment:     Calculation used to obtain the estimated glomerular filtration  rate (eGFR) is the CKD-EPI equation.          Lab Results   Component Value Date    HGBA1C 5.2 04/04/2019             ASSESSMENT: 49 y.o. year old male with chronic low back pain and neuropathic pain, consistent with     1. Pain amplification syndrome     2. Other chronic pain  Ambulatory referral/consult to Pain Clinic   3. MS (multiple sclerosis)  Ambulatory referral/consult to Pain Clinic    tiZANidine (ZANAFLEX) 4 MG tablet    amitriptyline (ELAVIL) 100 MG tablet    Ambulatory referral/consult to Neurology   4. Neuropathic pain           PLAN:   - Interventions: Patient reported that he is not interested in interventional methods to treat his pain..     - Anticoagulation use: no     - Medications:I have counseled the patient on importance of smoking  cessation and specifically poor outcomes related to spine and spine surgery.,  I have stressed the importance of physical activity and a home exercise plan to help with pain and improve health. and Start Zanaflex 4mg TID prn to help with muscular symptoms. Explained titration schedule with starting nightly and increase dose gradually to tolerance without adverse effects.   Will also provide Elavil 100 mg nightly to help him with his neuropathic pain control.  Patient has failed multiple different neuropathic pain medications such as gabapentin, Lyrica, and Elavil in the past.  However, he usually stop these medications for for reaching therapeutic dosing.  I discussed with the patient that I do not believe opioids are indicated for his pain related to mostly a neuropathic pain condition related to the multiple sclerosis.  Patient became agitated and was not pleased with my belief on opioid medications for his chronic pain condition.  Patient also has a high risk of opioid misuse due to history of polysubstance abuse in his past.  Therefore, I do not believe he is a good candidate for chronic opioid medication management secondary to his condition of neuropathic pain and high risk behavior.    - Therapy:  Advised patient continue with activities and exercises as tolerated    - Psychological:  Discussed coping mechanisms to help address chronic pain issues    - Labs:  Reviewed    - Imaging: Reviewed available imaging with patient and answered any questions they had regarding study.    - Consults/Referrals:  Place referral to neurology [facility] in order to transfer his MS care to a closer facility rather than traveling to [place]    - Records:  Reviewed/Obtain old records from outside physicians and imaging    - Follow up visit: return to clinic in 8-10 weeks or as needed    - Counseled patient regarding the importance of activity modification, smoking cessation and physical therapy    - This condition does  not require this patient to take time off of work, and the primary goal of our Pain Management services is to improve the patient's functional capacity.    - Patient Questions: Answered all of the patient's questions regarding diagnosis, therapy, and treatment        The above plan and management options were discussed at length with patient. Patient is in agreement with the above and verbalized understanding.    I discussed the goals of interventional chronic pain management with the patient on today's visit.  I explained the utility of injections for diagnostic and therapeutic purposes.  We discussed a multimodal approach to pain including treating the patient's given worst pain at any given time.  We will use a systematic approach to addressing pain.  We will also adopt a multimodal approach that includes injections, adjuvant medications, physical therapy, at times psychiatry.  There may be a limited role for opioid use intermittently in the treatment of pain, more particularly for acute pain although no one approach can be used as a sole treatment modality.    I emphasized the importance of regular exercise, core strengthening and stretching, diet and weight loss as a cornerstone of long-term pain management.      Javy Brunner MD  Interventional Pain Management  [facility]    Disclaimer:  This note was prepared using voice recognition system and is likely to have sound alike errors that may have been overlooked even after proof reading.  Please call me with any questions

## 2020-08-13 ENCOUNTER — TELEPHONE (OUTPATIENT)
Dept: PAIN MEDICINE | Facility: CLINIC | Age: 50
End: 2020-08-13

## 2020-08-13 NOTE — TELEPHONE ENCOUNTER
----- Message from Marilia Cotton sent at 8/13/2020 11:27 AM CDT -----  Contact: pharmacy  Type:  Pharmacy Calling to Clarify an RX    Name of Caller:Estela  Pharmacy Name:[facility] in [place]  Prescription Name:Tizanidine 4mg  What do they need to clarify?:prescription came with two sets of directions, needs to clarify which directions to use  Best Call Back Number: [phone]  Additional Information:   Blessing/ANIRUDH

## 2020-08-13 NOTE — TELEPHONE ENCOUNTER
Informed pharmacist to do,  Start Zanaflex 4mg TID prn to help with muscular symptoms. Per  note . Pt understood. All questions answered.   Emmett King MA  [facility]

## 2020-08-16 ENCOUNTER — HOSPITAL ENCOUNTER (EMERGENCY)
Facility: HOSPITAL | Age: 50
Discharge: HOME | End: 2020-08-16
Attending: EMERGENCY MEDICINE
Payer: MEDICARE

## 2020-08-16 VITALS
WEIGHT: 153.44 LBS | HEART RATE: 87 BPM | HEIGHT: 64 IN | OXYGEN SATURATION: 97 % | SYSTOLIC BLOOD PRESSURE: 117 MMHG | TEMPERATURE: 98 F | DIASTOLIC BLOOD PRESSURE: 63 MMHG | RESPIRATION RATE: 18 BRPM | BODY MASS INDEX: 26.2 KG/M2

## 2020-08-16 DIAGNOSIS — G89.29 OTHER CHRONIC PAIN: Primary | ICD-10-CM

## 2020-08-16 PROCEDURE — 99283 EMERGENCY DEPT VISIT LOW MDM: CPT

## 2020-08-16 PROCEDURE — 25000003 PHARM REV CODE 250: Performed by: NURSE PRACTITIONER

## 2020-08-16 RX ORDER — OXYCODONE AND ACETAMINOPHEN 10; 325 MG/1; MG/1
1 TABLET ORAL
Status: COMPLETED | OUTPATIENT
Start: 2020-08-16 | End: 2020-08-16

## 2020-08-16 RX ORDER — OXYCODONE AND ACETAMINOPHEN 10; 325 MG/1; MG/1
1 TABLET ORAL EVERY 6 HOURS PRN
Qty: 12 TABLET | Refills: 0 | Status: SHIPPED | OUTPATIENT
Start: 2020-08-16

## 2020-08-16 RX ADMIN — OXYCODONE AND ACETAMINOPHEN 1 TABLET: 10; 325 TABLET ORAL at 11:08

## 2020-08-16 NOTE — ED PROVIDER NOTES
HISTORY     Chief Complaint   Patient presents with    Generalized Pain     Chronic pain - worsened over the last few days. Out of pain medication. Hx of MS.     Review of patient's allergies indicates:   Allergen Reactions    Cat/feline products     Chocolate flavor Itching    Dog hair standardized allergenic extract         HPI   49 year old male with a past medical history including mulitple sclerosis presents to the ER for pain in arms, legs, and feet. Pt reports he is currently going through the process of swithing to a new pain management doctor and does not have an appointment until the 30th of this month. Pt is requesting pain medication in the ER to last him until his appointment. Explained to pt that a short duration of pain medication could be supplied to pt, but unable to provide medications until requested time. Pt reports he has tried numerous medications in the past and has only found relief with percocet     The history is provided by the patient. No  was used.        PCP: Angel Simon MD     Past Medical History:  Past Medical History:   Diagnosis Date    Neuromuscular disorder     MS        Past Surgical History:  Past Surgical History:   Procedure Laterality Date    TONSILLECTOMY          Family History:  Family History   Problem Relation Age of Onset    Hypertension Mother     COPD Mother     Heart disease Father     Diabetes Sister     Stroke Brother         Social History:  Social History     Tobacco Use    Smoking status: Current Some Day Smoker     Packs/day: 1.00     Years: 32.00     Pack years: 32.00     Types: Cigarettes    Smokeless tobacco: Never Used   Substance and Sexual Activity    Alcohol use: Not Currently     Frequency: Never     Drinks per session: Patient refused     Binge frequency: Never     Comment: seldom    Drug use: No    Sexual activity: Not on file         ROS   Review of Systems   Constitutional: Negative for chills, fatigue  "and fever.   HENT: Negative for sore throat.    Respiratory: Negative for chest tightness and shortness of breath.    Cardiovascular: Negative for chest pain.   Gastrointestinal: Negative for nausea.   Genitourinary: Negative for dysuria.   Musculoskeletal: Negative for back pain.        +generalized pain in arms, legs, and feet   Skin: Negative for rash.   Neurological: Negative for dizziness and weakness.   Hematological: Does not bruise/bleed easily.       PHYSICAL EXAM     Initial Vitals [08/16/20 1117]   BP Pulse Resp Temp SpO2   117/63 87 16 98.1 °F (36.7 °C) 97 %      MAP       --           Physical Exam    Nursing note and vitals reviewed.  Constitutional: He appears well-developed and well-nourished. He is not diaphoretic. No distress.   HENT:   Head: Normocephalic and atraumatic.   Eyes: Right eye exhibits no discharge. Left eye exhibits no discharge.   Neck: Normal range of motion. Neck supple.   Cardiovascular: Normal rate.   Pulmonary/Chest: Breath sounds normal. No respiratory distress. He has no wheezes. He has no rhonchi. He has no rales.   Abdominal: Soft. Bowel sounds are normal.   Musculoskeletal: Normal range of motion.   Neurological: He is alert and oriented to person, place, and time. He has normal strength.   Skin: Skin is warm and dry.   Psychiatric: He has a normal mood and affect. His behavior is normal. Thought content normal.          ED COURSE   Procedures  ED ONGOING VITALS:  Vitals:    08/16/20 1117   BP: 117/63   Pulse: 87   Resp: 16   Temp: 98.1 °F (36.7 °C)   TempSrc: Oral   SpO2: 97%   Weight: 69.6 kg (153 lb 7 oz)   Height: 5' 4" (1.626 m)         ABNORMAL LAB VALUES:  Labs Reviewed - No data to display      ALL LAB VALUES:  none      RADIOLOGY STUDIES:  Imaging Results    None                   The above vital signs and test results have been reviewed by the emergency provider.     ED Medications:  Medications   oxyCODONE-acetaminophen  mg per tablet 1 tablet (has no " administration in time range)       Current Discharge Medication List        Discharge Medications:  New Prescriptions    OXYCODONE-ACETAMINOPHEN (PERCOCET)  MG PER TABLET    Take 1 tablet by mouth every 6 (six) hours as needed for Pain.      Follow-up Information     Angel Simon MD.    Specialty: Family Medicine  Why: As needed  Contact information:  [address] [facility] [address]  [phone]                  11:30 AM  Pt last given a 2 day supply of pain medication on 8/6/2020 according to . Explained that a short duration can be prescribed but pt would have to follow up with his new pain management doctor    I discussed with patient and/or family/caretaker that evaluation in the ED does not suggest any emergent or life threatening medical conditions requiring immediate intervention beyond what was provided in the ED, and I believe patient is safe for discharge. Regardless, an unremarkable evaluation in the ED does not preclude the development or presence of a serious or life threatening condition. As such, patient was instructed to return immediately for any worsening or change in current symptoms.        MEDICAL DECISION MAKING                 CLINICAL IMPRESSION       ICD-10-CM ICD-9-CM   1. Other chronic pain  G89.29 338.29       Disposition:   Disposition: Discharged  Condition: Stable         Javy Gr NP  08/16/20 1131

## 2020-08-20 ENCOUNTER — HOSPITAL ENCOUNTER (EMERGENCY)
Facility: HOSPITAL | Age: 50
Discharge: HOME | End: 2020-08-20
Attending: EMERGENCY MEDICINE
Payer: MEDICARE

## 2020-08-20 ENCOUNTER — TELEPHONE (OUTPATIENT)
Dept: NEUROLOGY | Facility: CLINIC | Age: 50
End: 2020-08-20

## 2020-08-20 VITALS
RESPIRATION RATE: 18 BRPM | OXYGEN SATURATION: 96 % | BODY MASS INDEX: 26.92 KG/M2 | TEMPERATURE: 98 F | DIASTOLIC BLOOD PRESSURE: 86 MMHG | SYSTOLIC BLOOD PRESSURE: 135 MMHG | HEART RATE: 101 BPM | WEIGHT: 156.88 LBS

## 2020-08-20 DIAGNOSIS — G35 HISTORY OF MULTIPLE SCLEROSIS: ICD-10-CM

## 2020-08-20 DIAGNOSIS — G89.4 CHRONIC PAIN DISORDER: Primary | ICD-10-CM

## 2020-08-20 DIAGNOSIS — R46.89 ARGUMENTATIVE BEHAVIOR: ICD-10-CM

## 2020-08-20 DIAGNOSIS — Z76.5 DRUG-SEEKING BEHAVIOR: ICD-10-CM

## 2020-08-20 DIAGNOSIS — Z72.0 TOBACCO ABUSE DISORDER: ICD-10-CM

## 2020-08-20 PROCEDURE — 99281 EMR DPT VST MAYX REQ PHY/QHP: CPT

## 2020-08-20 NOTE — TELEPHONE ENCOUNTER
----- Message from Ev Bailey RN sent at 8/13/2020  9:35 AM CDT -----  Regarding: FW: Patient transportation issues  Please contact pt to help him schedule his MRIs, ordered in February, in [place], and a virtual visit with Dr. Mills in September.     Ev  ----- Message -----  From: Shabnam Black PharmD  Sent: 8/11/2020   7:11 PM CDT  To: Lul Willis PharmD, [facility] Staff  Subject: Patient transportation issues                    Good Evening,     I spoke with Mr. Yobany delong to followup on his Copaxone. When I asked him about scheduling his MRI and his next appt for followup, he stated that he has transportation issues and cannot make it to [place]. I could not confirm that he's had an MRI in the past year since starting the Copaxone. Is there anyway he might be able to be scheduled in [place] or somewhere closer to his home ([place])? Thanks!    Best,  Shabnam Black, PharmD  Specialty Pharmacy Clinical Pharmacist  [facility]  P: [phone]

## 2020-08-20 NOTE — ED PROVIDER NOTES
SCRIBE #1 NOTE: I, Cassy Jacobo, am scribing for, and in the presence of, Brittany Sue MD. I have scribed the entire note.       History     Chief Complaint   Patient presents with    Generalized Body Aches     had an MS episode last night and is out of pain medication; starting with pain management at the end of this month     Review of patient's allergies indicates:   Allergen Reactions    Cat/feline products     Chocolate flavor Itching    Dog hair standardized allergenic extract          History of Present Illness     HPI    8/20/2020, 12:21 PM  History obtained from the patient      History of Present Illness: Logan Haywood is a 49 y.o. male patient with a h/o neuromuscular disorder, who presents to the Emergency Department for evaluation of chronic generalized body aches. Pt states that he had an MS episode last night and is out of pain medication. He reports that he is starting with a new pain management doctor at the end of this month. Pt was seen at our ED on 8/16/20 for chronic pain and was prescribed 10 tablets of 325 mg oxycodone. Symptoms are constant and moderate in severity. No mitigating or exacerbating factors reported. No associated sxs reported. Patient denies any CP, SOB, n/v, fever, and all other sxs at this time. No further complaints or concerns at this time.       Arrival mode: Personal transportation     PCP: Angel Simon MD      Past Medical History:  Past Medical History:   Diagnosis Date    Neuromuscular disorder     MS       Past Surgical History:  Past Surgical History:   Procedure Laterality Date    TONSILLECTOMY           Family History:  Family History   Problem Relation Age of Onset    Hypertension Mother     COPD Mother     Heart disease Father     Diabetes Sister     Stroke Brother        Social History:   Social History     Tobacco Use    Smoking status: Current Some Day Smoker     Packs/day: 1.00     Years: 32.00     Pack years: 32.00      Types: Cigarettes    Smokeless tobacco: Never Used   Substance and Sexual Activity    Alcohol use: Not Currently     Frequency: Never     Drinks per session: Patient refused     Binge frequency: Never     Comment: seldom    Drug use: No    Sexual activity: Unknown        Review of Systems     Review of Systems   Constitutional: Negative for fever.   HENT: Negative for sore throat.    Respiratory: Negative for shortness of breath.    Cardiovascular: Negative for chest pain.   Gastrointestinal: Negative for diarrhea, nausea and vomiting.   Genitourinary: Negative for dysuria.   Musculoskeletal: Negative for back pain.        (+) chronic generalized body aches   Skin: Negative for rash.   Neurological: Negative for weakness and headaches.   Hematological: Does not bruise/bleed easily.   All other systems reviewed and are negative.       Physical Exam     Initial Vitals [08/20/20 1215]   BP Pulse Resp Temp SpO2   135/86 101 18 98 °F (36.7 °C) 96 %      MAP       --          Physical Exam  Nursing Notes and Vital Signs Reviewed.  Constitutional: Well-developed and well-nourished. Pt is in no acute distress.  Head: Atraumatic. Normocephalic.  Eyes: EOM intact. No scleral icterus.  ENT: Mucous membranes are moist. Oropharynx is clear and symmetric.    Neck: Supple. Full ROM. No lymphadenopathy.  Cardiovascular: Regular rate. Regular rhythm. No murmurs, rubs, or gallops. Distal pulses are 2+ and symmetric.  Pulmonary/Chest: No respiratory distress. Clear to auscultation bilaterally. No wheezing or rales.  Abdominal: Soft and non-distended.  There is no tenderness.  No rebound, guarding, or rigidity.   Genitourinary: No CVA tenderness  Musculoskeletal: Ambulates with cane at baseline. Moves all extremities. No obvious deformities.   Skin: Warm and dry.  Neurological:  Alert, awake, and appropriate.  Normal speech.  No acute focal neurological deficits are appreciated.  Psychiatric: Normal affect. Good eye contact.  Appropriate in content.     ED Course   Procedures  ED Vital Signs:  Vitals:    08/20/20 1215   BP: 135/86   Pulse: 101   Resp: 18   Temp: 98 °F (36.7 °C)   TempSrc: Oral   SpO2: 96%   Weight: 71.2 kg (156 lb 13.7 oz)       Abnormal Lab Results:  Labs Reviewed - No data to display         The Emergency Provider reviewed the vital signs and test results, which are outlined above.     ED Discussion       12:42 PM: Reassessed pt at this time.  Pt states his condition has improved at this time. Discussed with pt all pertinent ED information. Pt became argumentative after telling pt that we do not prescribe chronic pain medications in the Emergency Department. Pt was told to f/u with his PCP and pain management. Gave pt all f/u and return to the ED instructions. All questions and concerns were addressed at this time. Pt expresses understanding of information and instructions, and is comfortable with plan to discharge. Pt is stable for discharge.    I discussed with patient and/or family/caretaker that evaluation in the ED does not suggest any emergent or life threatening medical conditions requiring immediate intervention beyond what was provided in the ED, and I believe patient is safe for discharge.  Regardless, an unremarkable evaluation in the ED does not preclude the development or presence of a serious of life threatening condition. As such, patient was instructed to return immediately for any worsening or change in current symptoms.       MDM            Additional MDM:   Smoking Cessation: The patient is a smoker. The patient was counseled on smoking cessation for: 3 minutes. The patient was counseled on tobacco related  health complications.        ED Medication(s):  Medications - No data to display    Discharge Medication List as of 8/20/2020 12:40 PM          Follow-up Information     Angel Simon MD. Schedule an appointment as soon as possible for a visit today.    Specialty: Family Medicine  Contact  information:  [id number] [place] [address] [place] [address]  [phone]             Javy Brunner MD. Schedule an appointment as soon as possible for a visit in 1 day.    Specialty: Pain Medicine  Contact information:  [address] [place] [address]   [place] [address]  [phone]                       Scribe Attestation:   Scribe #1: I performed the above scribed service and the documentation accurately describes the services I performed. I attest to the accuracy of the note.     Attending:   Physician Attestation Statement for Scribe #1: I, Brittany Sue MD, personally performed the services described in this documentation, as scribed by Cassy Jacobo, in my presence, and it is both accurate and complete.           Clinical Impression       ICD-10-CM ICD-9-CM   1. Chronic pain disorder  G89.4 338.4   2. History of multiple sclerosis  Z86.69 V12.49   3. Drug-seeking behavior  Z76.5 305.90   4. Tobacco abuse disorder  Z72.0 305.1   5. Argumentative behavior  R46.89 780.99       Disposition:   Disposition: Discharged  Condition: Stable         Brittany Sue MD  08/20/20 2564

## 2020-09-02 DIAGNOSIS — Z51.81 ENCOUNTER FOR MONITORING IMMUNOMODULATING THERAPY: ICD-10-CM

## 2020-09-02 DIAGNOSIS — Q23.2 MS (CONGENITAL MITRAL STENOSIS): Primary | ICD-10-CM

## 2020-09-02 DIAGNOSIS — Z79.899 ENCOUNTER FOR MONITORING IMMUNOMODULATING THERAPY: ICD-10-CM

## 2020-09-02 NOTE — TELEPHONE ENCOUNTER
Care Due:                  Date            Visit Type   Department     Provider  --------------------------------------------------------------------------------                                ESTABLISHED   ON Internal  Last Visit: 07-      PATIENT      Medicine       Angel Simon                              ESTABLISHED   ON Internal  Next Visit: 02-      PATIENT      Medicine       Angel Simon                                                            Last  Test          Frequency    Reason                     Performed    Due Date  --------------------------------------------------------------------------------    Cr..........  12 months..  DULoxetine...............  04- 03-    Powered by [employer]. Reference number: [id number]. 9/02/2020 5:07:57 PM CDT

## 2020-09-03 ENCOUNTER — HOSPITAL ENCOUNTER (OUTPATIENT)
Dept: RADIOLOGY | Facility: HOSPITAL | Age: 50
Discharge: HOME | End: 2020-09-03
Attending: PSYCHIATRY & NEUROLOGY
Payer: MEDICARE

## 2020-09-03 DIAGNOSIS — G35 MS (MULTIPLE SCLEROSIS): ICD-10-CM

## 2020-09-03 DIAGNOSIS — Z51.81 ENCOUNTER FOR MONITORING IMMUNOMODULATING THERAPY: ICD-10-CM

## 2020-09-03 DIAGNOSIS — Z79.899 ENCOUNTER FOR MONITORING IMMUNOMODULATING THERAPY: ICD-10-CM

## 2020-09-03 PROCEDURE — 25500020 PHARM REV CODE 255: Performed by: PSYCHIATRY & NEUROLOGY

## 2020-09-03 PROCEDURE — A9585 GADOBUTROL INJECTION: HCPCS | Performed by: PSYCHIATRY & NEUROLOGY

## 2020-09-03 PROCEDURE — 70553 MRI BRAIN STEM W/O & W/DYE: CPT | Mod: TC

## 2020-09-03 RX ORDER — GADOBUTROL 604.72 MG/ML
10 INJECTION INTRAVENOUS
Status: COMPLETED | OUTPATIENT
Start: 2020-09-03 | End: 2020-09-03

## 2020-09-03 RX ADMIN — GADOBUTROL 7 ML: 604.72 INJECTION INTRAVENOUS at 02:09

## 2020-09-03 NOTE — PROGRESS NOTES
Refill Routing Note   Medication(s) are not appropriate for processing by [facility]:       - Patient has been seen in the Emergency Room/Department since the last PCP visit        Medication Therapy Plan: CDMR. pt has a recent ED visit; FOVS(2/21); please advise; defer   Medication reconciliation completed: No      Automatic Epic Generated Protocol Data:        Requested Prescriptions   Pending Prescriptions Disp Refills    DULoxetine (CYMBALTA) 30 MG capsule [Pharmacy Med Name: DULOXETINE DR 30MG CAPSULES] 90 capsule 0     Sig: TAKE 1 CAPSULE(30 MG) BY MOUTH EVERY DAY       Psychiatry: Antidepressants - SNRI Passed - 9/3/2020  6:03 PM        Passed - Patient is at least 18 years old        Passed - Last BP in normal range within 360 days.     BP Readings from Last 3 Encounters:   08/20/20 135/86   08/16/20 117/63   08/12/20 127/89              Passed - Office visit in past 6 months or future 90 days.     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    [facility] Javy Brunner MD    1 month ago Other chronic pain    [facility] Angel Simon MD    6 months ago MS (multiple sclerosis)    [address] Maria Teresa Mills MD    6 months ago Insomnia, unspecified type    [facility] Angel Simon MD    11 months ago MS (multiple sclerosis)    [facility] Angel Simon MD          Future Appointments              In 1 week Maria Teresa Mills MD [address], [facility]    In 1 month Javy Brunner MD [facility],  [facility]    In 5 months Angel Simon MD [facility], [facility]                Passed - Cr is 1.3 or below and within 360 days     Creatinine   Date Value Ref Range Status   09/03/2020 0.8 0.5 - 1.4 mg/dL Final   04/04/2019 0.8 0.5 - 1.4 mg/dL Final   01/24/2019 0.8 0.5 - 1.4 mg/dL Final              Passed - eGFR within  360 days     eGFR if non    Date Value Ref Range Status   09/03/2020 >60 >60 mL/min/1.73 m^2 Final     Comment:     Calculation used to obtain the estimated glomerular filtration  rate (eGFR) is the CKD-EPI equation.      04/04/2019 >60.0 >60 mL/min/1.73 m^2 Final     Comment:     Calculation used to obtain the estimated glomerular filtration  rate (eGFR) is the CKD-EPI equation.      01/24/2019 >60.0 >60 mL/min/1.73 m^2 Final     Comment:     Calculation used to obtain the estimated glomerular filtration  rate (eGFR) is the CKD-EPI equation.        eGFR if    Date Value Ref Range Status   09/03/2020 >60 >60 mL/min/1.73 m^2 Final   04/04/2019 >60.0 >60 mL/min/1.73 m^2 Final   01/24/2019 >60.0 >60 mL/min/1.73 m^2 Final                    Appointments  past 12m or future 3m with PCP    Date Provider   Last Visit   7/23/2020 Angel Simon MD   Next Visit   2/15/2021 Angel Simon MD   ED visits in past 90 days: 4     Note composed:6:05 PM 09/03/2020

## 2020-09-04 RX ORDER — DULOXETIN HYDROCHLORIDE 30 MG/1
CAPSULE, DELAYED RELEASE ORAL
Qty: 90 CAPSULE | Refills: 0 | OUTPATIENT
Start: 2020-09-04

## 2020-09-10 ENCOUNTER — OFFICE VISIT (OUTPATIENT)
Dept: NEUROLOGY | Facility: CLINIC | Age: 50
End: 2020-09-10
Payer: MEDICARE

## 2020-09-10 DIAGNOSIS — Z79.899 ENCOUNTER FOR MONITORING IMMUNOMODULATING THERAPY: ICD-10-CM

## 2020-09-10 DIAGNOSIS — G35 MS (MULTIPLE SCLEROSIS): Primary | ICD-10-CM

## 2020-09-10 DIAGNOSIS — Z51.81 ENCOUNTER FOR MONITORING IMMUNOMODULATING THERAPY: ICD-10-CM

## 2020-09-10 PROCEDURE — 99215 PR OFFICE/OUTPT VISIT, EST, LEVL V, 40-54 MIN: ICD-10-PCS | Mod: 95,,, | Performed by: PSYCHIATRY & NEUROLOGY

## 2020-09-10 PROCEDURE — 99215 OFFICE O/P EST HI 40 MIN: CPT | Mod: 95,,, | Performed by: PSYCHIATRY & NEUROLOGY

## 2020-09-10 RX ORDER — DIAZEPAM 5 MG/1
TABLET ORAL
Qty: 2 TABLET | Refills: 0 | Status: SHIPPED | OUTPATIENT
Start: 2020-09-10

## 2020-09-10 NOTE — PROGRESS NOTES
The patient location is: [place]  The chief complaint leading to consultation is: MS f/u    Visit type: audiovisual    Face to Face time with patient: 40  60 minutes of total time spent on the encounter, which includes face to face time and non-face to face time preparing to see the patient (eg, review of tests), Obtaining and/or reviewing separately obtained history, Documenting clinical information in the electronic or other health record, Independently interpreting results (not separately reported) and communicating results to the patient/family/caregiver, or Care coordination (not separately reported).         Each patient to whom he or she provides medical services by telemedicine is:  (1) informed of the relationship between the physician and patient and the respective role of any other health care provider with respect to management of the patient; and (2) notified that he or she may decline to receive medical services by telemedicine and may withdraw from such care at any time.    Notes:         Subjective:       Patient ID: Logan Haywood is a 49 y.o. male who presents today for a routine telemedicine visit for MS followup.      MS History- updated as needed  RH male with history of mood disorders and substance abuse, MS diagnosed in 2/2018.   - Since 2013/2014, he's had has numbness and painful tingling in feet and restless leg movements at night. Has always had issues with fatigue.   - 2/2018 - Admitted to hospital ([facility] and diagnosed with MS w/ numbness and weakness to the left side of body with left-sided pain radiating down left body; no Lhermitte's. MRI brain with multiple elliptical foci of T2 hyperintensity perpendicular to the lateral ventricles and some subcortical in both frontal lobes, consistent with demyelination. There was one enhancing lesion in the subcortical region of the lateral left temporal lobe. He also had several lesions in C-spine with one enhancing at C2. He was advised  "to stay for LP and IV steroids, but patient chose to leave A. He returned to ED 2/27 for rest of w/u and treatment. He was treated with IV steroids and started on paroxetine for depression as he reported SI and clonazepam for restless legs/sleep.   3/2018 - Reported right hand numbness in fingertips, but no new significant symptoms. When he lays down on left side, he feels altered sensation like there's a pillow between his legs. He was started on Tecfidera 3/2018, but initially not adherent. Resumed medication 5/2018.  - 1/2019 Tecfidera stopped due to significant leukopenia.  - 3/2019 switched to Copaxone.      TODAY/SUBJECTIVE:  · DMT: Copaxone TIW  · Side effects from DMT? ISR's with injections  · Taking vitamin D3 as recommended? yes. Ran out. Dose: 5000 iu. Also taking fish oil  · No new symptoms/signs of relapse.  · Needs valium to finish MRI c-spine.  · Following with pain management, which he says helps a lot. He reports that pain is minimal  · Mood - reports that he is doing "great", currently in an outpatient program. With [facility]        SOCIAL HISTORY  Social History     Tobacco Use    Smoking status: Current Some Day Smoker     Packs/day: 0.50     Years: 32.00     Pack years: 16.00     Types: Cigarettes    Smokeless tobacco: Never Used    Tobacco comment: 9.10.20   Substance Use Topics    Alcohol use: Not Currently     Frequency: Never     Drinks per session: Patient refused     Binge frequency: Never     Comment: seldom    Drug use: No   Current tobacco use - 0.5ppd (cut down from 1ppd)  Living arrangements - lives alone, in a first floor apt  Employment: Unemployed. Has disability      No flowsheet data found.          Objective:        1. 25 foot timed walk: 1/2019 7.4s (w/o assist) <-- 5.78s (5/2018) <-- 3/2018 8.26s w/o assist  No flowsheet data found.  2. SDMT:  3. 9 Hole Peg Test:  No flowsheet data found.    Neurologic Exam      MENTAL STATUS: grossly intact. Normal language, " attention.   CRANIAL NERVE EXAM: Extraocular muscles are intact.  No facial asymmetry. Tongue midline. Shoulder shrug normal b/l. There is no dysarthria.   MOTOR EXAM:  Rapid sequential movements are normal in b/l hands. No pronator drift. Can stand on right leg without issue and can hop on right leg 3 times. Can only hop on left leg once without imbalance. Strength is at least 4/5 in all groups in the lower extremities and upper extremities.   SENSORY EXAM: Normal to LT in RUE but numbness to left body   COORDINATION: Normal modified finger-to-nose exam.   GAIT: Narrow based and stable.          Imaging:       MRI Brain w/wo 9/3/20 Stable. No new lesions suggest active disease. 9/2018 - Multiple foci of increased signal in a periventricular distribution bilaterally similar to the prior examination dated 02/26/2018.  No evidence of abnormal enhancement post contrast material.  No evidence of acute demyelination. 2/2018 - Findings are most concerning for demyelinating disease and multiple sclerosis.  One of the lesions within the subcortical white matter of the left temporal lobe demonstrates abnormal enhancement     MRI C-spine w/wo 9/2018 - Patchy areas of signal can be seen in the cord at C2, C3, C5, C7 and T2. This is unchanged when compared with the prior study dated 02/26/2018.  No abnormal enhancement to suspect acute demyelination. 2/2018 - There are four separate foci of abnormal T2 signal within the cervical cord as discussed above.  The one at the level of C2 demonstrates abnormal enhancement.      Labs:     Lab Results   Component Value Date    EOAZZWZQ65XX 43 04/04/2019    PYGFWYTZ35YL 18 (L) 01/24/2019    [id number] 34 05/24/2018     No results found for: JCVINDEX, JCVANTIBODY  Lab Results   Component Value Date    HH3EAQQT 63.4 12/20/2018    ABSOLUTECD3 297 (L) 12/20/2018    ML0ORPFP 10.7 12/20/2018    ABSOLUTECD8 50.3 (L) 12/20/2018    NA6MFVNN 51.9 12/20/2018    ABSOLUTECD4 243 (L) 12/20/2018     LABCD48 4.83 (H) 12/20/2018     Lab Results   Component Value Date    WBC 6.96 04/29/2019    RBC 5.64 04/29/2019    HGB 17.3 04/29/2019    HCT 51.3 04/29/2019    MCV 91 04/29/2019    MCH 30.7 04/29/2019    MCHC 33.7 04/29/2019    RDW 12.7 04/29/2019     04/29/2019    MPV 9.9 04/29/2019    GRAN 5.2 04/29/2019    GRAN 74.0 (H) 04/29/2019    LYMPH 0.9 (L) 04/29/2019    LYMPH 12.2 (L) 04/29/2019    MONO 0.5 04/29/2019    MONO 7.0 04/29/2019    EOS 0.3 04/29/2019    BASO 0.06 04/29/2019    EOSINOPHIL 4.6 04/29/2019    BASOPHIL 0.9 04/29/2019       Chemistry        Component Value Date/Time     04/04/2019 1441    K 4.8 04/04/2019 1441     04/04/2019 1441    CO2 27 04/04/2019 1441    BUN 7 04/04/2019 1441    CREATININE 0.8 09/03/2020 1240    GLU 95 04/04/2019 1441        Component Value Date/Time    CALCIUM 10.3 04/04/2019 1441    ALKPHOS 84 06/04/2019 1352    AST 27 06/04/2019 1352    ALT 45 (H) 06/04/2019 1352    BILITOT 0.6 06/04/2019 1352    ESTGFRAFRICA >60 09/03/2020 1240    EGFRNONAA >60 09/03/2020 1240              Diagnosis/Assessment/Plan:   1. Multiple sclerosis  · Assessment: Diagnosed with RRMS 2/2018 based on clinical history, MRI, and CSF studies. On Copaxone since 3/2019. Currently clinically stable to improved. MRI brain stable but MRI c-spine not done.  · Imaging: MRI c-spine due at this time. Valium prescribed for claustrophobia  · DMT: continue Copaxone  · The the patient was counseled about the importance of vitamin D supplementation in multiple sclerosis, and the fact that recent data suggests that high circulating blood levels of vitamin D has an ameliorating effect on the disease course in multiple sclerosis in general. Repeat vit D level today. Continue supplementation      2. MS Symptomatic management:  · Fatigue/sleep: rx'd ambien for sleep by another provider, stable. Continue to monitor.  · Pain: following with pain specialist  · Gait disturbance: appears stable at this time.  No falls.      RTC in 6 months with me      Over 50% of this 40 minute visit was spent in direct face to face counseling of the patient about MS, DMT management, and MS symptom management.         There are no diagnoses linked to this encounter.

## 2020-09-16 ENCOUNTER — TELEPHONE (OUTPATIENT)
Dept: PHARMACY | Facility: CLINIC | Age: 50
End: 2020-09-16

## 2020-09-28 RX ORDER — ZOLPIDEM TARTRATE 10 MG/1
10 TABLET ORAL NIGHTLY PRN
Qty: 30 TABLET | Refills: 1 | Status: SHIPPED | OUTPATIENT
Start: 2020-09-28 | End: 2020-12-11 | Stop reason: SDUPTHER

## 2020-09-28 NOTE — PROGRESS NOTES
Refill Routing Note   Medication(s) are not appropriate for processing by [facility]:       - Outside of protocol           Medication reconciliation completed: No      Automatic Epic Generated Protocol Data:        Requested Prescriptions   Pending Prescriptions Disp Refills    zolpidem (AMBIEN) 10 mg Tab 30 tablet 1     Sig: Take 1 tablet (10 mg total) by mouth nightly as needed.       Off-Protocol Failed - 9/25/2020  3:03 PM        Failed - Medication not assigned to a protocol, review manually.        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    [facility] Javy Brunner MD    2 months ago Other chronic pain    [facility] Angel Simon MD    6 months ago MS (multiple sclerosis)    [address] Maria Teresa Mills MD    7 months ago Insomnia, unspecified type    [facility] Angel Simon MD          Future Appointments              In 4 months Angel Simon MD [facility],  Medical                       Appointments  past 12m or future 3m with PCP    Date Provider   Last Visit   7/23/2020 Angel Simon MD   Next Visit   2/15/2021 Angel Simon MD   ED visits in past 90 days: 3     Note composed:8:59 PM 09/27/2020

## 2020-10-08 ENCOUNTER — HOSPITAL ENCOUNTER (EMERGENCY)
Facility: HOSPITAL | Age: 50
Discharge: HOME | End: 2020-10-08
Attending: EMERGENCY MEDICINE
Payer: MEDICARE

## 2020-10-08 VITALS
TEMPERATURE: 99 F | DIASTOLIC BLOOD PRESSURE: 67 MMHG | HEART RATE: 95 BPM | OXYGEN SATURATION: 96 % | SYSTOLIC BLOOD PRESSURE: 123 MMHG | WEIGHT: 168.63 LBS | RESPIRATION RATE: 18 BRPM | BODY MASS INDEX: 28.79 KG/M2 | HEIGHT: 64 IN

## 2020-10-08 DIAGNOSIS — S01.01XA LACERATION OF SCALP, INITIAL ENCOUNTER: ICD-10-CM

## 2020-10-08 DIAGNOSIS — S00.432A CONTUSION OF AURICLE OF EAR, LEFT, INITIAL ENCOUNTER: ICD-10-CM

## 2020-10-08 DIAGNOSIS — S20.212A CHEST WALL CONTUSION, LEFT, INITIAL ENCOUNTER: ICD-10-CM

## 2020-10-08 DIAGNOSIS — M25.561 RIGHT KNEE PAIN: ICD-10-CM

## 2020-10-08 DIAGNOSIS — S02.2XXA CLOSED FRACTURE OF NASAL BONE, INITIAL ENCOUNTER: ICD-10-CM

## 2020-10-08 DIAGNOSIS — R07.9 CHEST PAIN: ICD-10-CM
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
## 2020-10-08 LAB
ALBUMIN SERPL BCP-MCNC: 3.9 G/DL (ref 3.5–5.2)
ALP SERPL-CCNC: 71 U/L (ref 55–135)
ALT SERPL W/O P-5'-P-CCNC: 37 U/L (ref 10–44)
ANION GAP SERPL CALC-SCNC: 11 MMOL/L (ref 8–16)
AST SERPL-CCNC: 24 U/L (ref 10–40)
BASOPHILS # BLD AUTO: 0.05 K/UL (ref 0–0.2)
BASOPHILS NFR BLD: 0.4 % (ref 0–1.9)
BILIRUB SERPL-MCNC: 0.3 MG/DL (ref 0.1–1)
BUN SERPL-MCNC: 7 MG/DL (ref 6–20)
CALCIUM SERPL-MCNC: 9.1 MG/DL (ref 8.7–10.5)
CHLORIDE SERPL-SCNC: 105 MMOL/L (ref 95–110)
CO2 SERPL-SCNC: 24 MMOL/L (ref 23–29)
CREAT SERPL-MCNC: 0.9 MG/DL (ref 0.5–1.4)
DIFFERENTIAL METHOD: ABNORMAL
EOSINOPHIL # BLD AUTO: 0.1 K/UL (ref 0–0.5)
EOSINOPHIL NFR BLD: 0.6 % (ref 0–8)
ERYTHROCYTE [DISTWIDTH] IN BLOOD BY AUTOMATED COUNT: 12 % (ref 11.5–14.5)
EST. GFR  (AFRICAN AMERICAN): >60 ML/MIN/1.73 M^2
EST. GFR  (NON AFRICAN AMERICAN): >60 ML/MIN/1.73 M^2
ETHANOL SERPL-MCNC: <10 MG/DL
GLUCOSE SERPL-MCNC: 105 MG/DL (ref 70–110)
HCT VFR BLD AUTO: 44.6 % (ref 40–54)
HCV AB SERPL QL IA: NEGATIVE
HGB BLD-MCNC: 15.1 G/DL (ref 14–18)
HIV 1+2 AB+HIV1 P24 AG SERPL QL IA: NEGATIVE
IMM GRANULOCYTES # BLD AUTO: 0.1 K/UL (ref 0–0.04)
IMM GRANULOCYTES NFR BLD AUTO: 0.7 % (ref 0–0.5)
LYMPHOCYTES # BLD AUTO: 1 K/UL (ref 1–4.8)
LYMPHOCYTES NFR BLD: 7.1 % (ref 18–48)
MCH RBC QN AUTO: 30.3 PG (ref 27–31)
MCHC RBC AUTO-ENTMCNC: 33.9 G/DL (ref 32–36)
MCV RBC AUTO: 89 FL (ref 82–98)
MONOCYTES # BLD AUTO: 0.8 K/UL (ref 0.3–1)
MONOCYTES NFR BLD: 6.3 % (ref 4–15)
NEUTROPHILS # BLD AUTO: 11.4 K/UL (ref 1.8–7.7)
NEUTROPHILS NFR BLD: 84.9 % (ref 38–73)
NRBC BLD-RTO: 0 /100 WBC
PLATELET # BLD AUTO: 199 K/UL (ref 150–350)
PMV BLD AUTO: 9.3 FL (ref 9.2–12.9)
POTASSIUM SERPL-SCNC: 3.9 MMOL/L (ref 3.5–5.1)
PROT SERPL-MCNC: 6.9 G/DL (ref 6–8.4)
RBC # BLD AUTO: 4.99 M/UL (ref 4.6–6.2)
SODIUM SERPL-SCNC: 140 MMOL/L (ref 136–145)
WBC # BLD AUTO: 13.43 K/UL (ref 3.9–12.7)

## 2020-10-08 PROCEDURE — 80053 COMPREHEN METABOLIC PANEL: CPT

## 2020-10-08 PROCEDURE — 80320 DRUG SCREEN QUANTALCOHOLS: CPT

## 2020-10-08 PROCEDURE — 93010 EKG 12-LEAD: ICD-10-PCS | Mod: ,,, | Performed by: INTERNAL MEDICINE

## 2020-10-08 PROCEDURE — 85025 COMPLETE CBC W/AUTO DIFF WBC: CPT

## 2020-10-08 PROCEDURE — 90715 TDAP VACCINE 7 YRS/> IM: CPT | Performed by: EMERGENCY MEDICINE

## 2020-10-08 PROCEDURE — 93010 ELECTROCARDIOGRAM REPORT: CPT | Mod: ,,, | Performed by: INTERNAL MEDICINE

## 2020-10-08 PROCEDURE — 99285 EMERGENCY DEPT VISIT HI MDM: CPT | Mod: 25

## 2020-10-08 PROCEDURE — 63600175 PHARM REV CODE 636 W HCPCS: Performed by: EMERGENCY MEDICINE

## 2020-10-08 PROCEDURE — 96374 THER/PROPH/DIAG INJ IV PUSH: CPT | Mod: 59

## 2020-10-08 PROCEDURE — 12001 RPR S/N/AX/GEN/TRNK 2.5CM/<: CPT

## 2020-10-08 PROCEDURE — 86703 HIV-1/HIV-2 1 RESULT ANTBDY: CPT

## 2020-10-08 PROCEDURE — 90471 IMMUNIZATION ADMIN: CPT | Performed by: EMERGENCY MEDICINE

## 2020-10-08 PROCEDURE — 86803 HEPATITIS C AB TEST: CPT

## 2020-10-08 PROCEDURE — 96375 TX/PRO/DX INJ NEW DRUG ADDON: CPT | Mod: 59

## 2020-10-08 PROCEDURE — 93005 ELECTROCARDIOGRAM TRACING: CPT

## 2020-10-08 RX ORDER — ONDANSETRON 2 MG/ML
4 INJECTION INTRAMUSCULAR; INTRAVENOUS
Status: COMPLETED | OUTPATIENT
Start: 2020-10-08 | End: 2020-10-08

## 2020-10-08 RX ORDER — MORPHINE SULFATE 4 MG/ML
4 INJECTION, SOLUTION INTRAMUSCULAR; INTRAVENOUS
Status: COMPLETED | OUTPATIENT
Start: 2020-10-08 | End: 2020-10-08

## 2020-10-08 RX ADMIN — CLOSTRIDIUM TETANI TOXOID ANTIGEN (FORMALDEHYDE INACTIVATED), CORYNEBACTERIUM DIPHTHERIAE TOXOID ANTIGEN (FORMALDEHYDE INACTIVATED), BORDETELLA PERTUSSIS TOXOID ANTIGEN (GLUTARALDEHYDE INACTIVATED), BORDETELLA PERTUSSIS FILAMENTOUS HEMAGGLUTININ ANTIGEN (FORMALDEHYDE INACTIVATED), BORDETELLA PERTUSSIS PERTACTIN ANTIGEN, AND BORDETELLA PERTUSSIS FIMBRIAE 2/3 ANTIGEN 0.5 ML: 5; 2; 2.5; 5; 3; 5 INJECTION, SUSPENSION INTRAMUSCULAR at 08:10

## 2020-10-08 RX ADMIN — ONDANSETRON 4 MG: 2 INJECTION INTRAMUSCULAR; INTRAVENOUS at 07:10

## 2020-10-08 RX ADMIN — MORPHINE SULFATE 4 MG: 4 INJECTION, SOLUTION INTRAMUSCULAR; INTRAVENOUS at 07:10
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
## 2020-10-13 ENCOUNTER — TELEPHONE (OUTPATIENT)
Dept: PHARMACY | Facility: CLINIC | Age: 50
End: 2020-10-13

## 2020-11-02 ENCOUNTER — PATIENT MESSAGE (OUTPATIENT)
Dept: PSYCHIATRY | Facility: CLINIC | Age: 50
End: 2020-11-02

## 2020-11-03 ENCOUNTER — PATIENT MESSAGE (OUTPATIENT)
Dept: PHARMACY | Facility: CLINIC | Age: 50
End: 2020-11-03

## 2020-11-05 ENCOUNTER — SPECIALTY PHARMACY (OUTPATIENT)
Dept: PHARMACY | Facility: CLINIC | Age: 50
End: 2020-11-05

## 2020-11-05 RX ORDER — TRAZODONE HYDROCHLORIDE 50 MG/1
50 TABLET ORAL NIGHTLY
COMMUNITY

## 2020-11-05 NOTE — TELEPHONE ENCOUNTER
Specialty Pharmacy - Patient Intervention    Patient education provided:  Drug therapy adherence: Drug administration    Impact on patient care:  Potentially improved therapy adherence    Additional Notes  Closing patient intervention. Intervention opened to discuss best Practice advisory from refill call. Patient reports running out of Copaxone 40 mg, so he started to inject Copaxone 20 mg (left over medication). Patient reports last injection of Copaxone 20 mg was . Advised patient not to inject with Copaxone 20 mg on  and start with the Copaxone 40 mg on  once received from [facility]. Patient understanding and willing to comply. No further questions or concerns. Confirmed-NAME AND .

## 2020-11-05 NOTE — TELEPHONE ENCOUNTER
Specialty Pharmacy - Refill Coordination    Specialty Medication Orders Linked to Encounter      Most Recent Value   Medication #1  glatiramer (COPAXONE) 40 mg/mL injection (Order#[id number], Rx#[id number])          Refill Questions - Documented Responses      Most Recent Value   HIPAA/medical authority confirmed?  Yes   Any changes in contact preferences or allowed representatives?  No   Has the patient had any insurance changes?  No   Has the patient had any changes to specialty medication, dose, or instructions?  (!) Yes   Has the patient started taking any new medications, herbals, or supplements?  (!) Yes   Has the patient been diagnosed with any new medical conditions?  No   Does the patient have any new allergies to medications or foods?  No   Does the patient have any concerns about side effects?  No   Can the patient store medication/sharps container properly (at the correct temperature, away from children/pets, etc.)?  Yes   Can the patient call emergency services (991) in the event of an emergency?  Yes   Does the patient have any concerns or questions about taking or administering this medication as prescribed?  No   How many doses did the patient miss in the past 4 weeks or since the last fill?  0   How many doses does the patient have on hand?  0   How many days does the patient report on hand quantity will last?  0   Does the number of doses/days supply remaining match pharmacy expected amounts?  (!) No   How will the patient receive the medication?  Mail   When does the patient need to receive the medication?  11/06/20   Shipping Address  Home   Address in [facility] confirmed and updated if neccessary?  Yes   Expected Copay ($)  0   Is the patient able to afford the medication copay?  Yes   Payment Method  zero copay   Days supply of Refill  28   Would patient like to speak to a pharmacist?  Yes   Do you want to trigger an intervention?  Yes   Refill activity completed?  Yes   Refill activity  plan  Refill scheduled   Shipment/Pickup Date:  11/05/20          Current Outpatient Medications   Medication Sig    amitriptyline (ELAVIL) 100 MG tablet Take 1 tablet (100 mg total) by mouth every evening.    cholecalciferol, vitamin D3, 125 mcg (5,000 unit) capsule Take 1 capsule (5,000 Units total) by mouth once daily.    diazePAM (VALIUM) 5 MG tablet Take 1 tab po 1 hour prior to MRI. Can repeat once if needed.    DULoxetine (CYMBALTA) 60 MG capsule Take 1 capsule (60 mg total) by mouth once daily. (Patient not taking: Reported on 9/10/2020)    glatiramer (COPAXONE) 40 mg/mL injection Inject 40 mg into the skin 3 (three) times a week.    hydrOXYzine pamoate (VISTARIL) 25 MG Cap     multivitamin capsule Take 1 capsule by mouth once daily.    oxyCODONE-acetaminophen (PERCOCET)  mg per tablet Take 1 tablet by mouth every 4 (four) hours as needed for Pain.    oxyCODONE-acetaminophen (PERCOCET)  mg per tablet Take 1 tablet by mouth every 6 (six) hours as needed for Pain.    rOPINIRole (REQUIP) 0.5 MG tablet TAKE 1 TABLET(0.5 MG) BY MOUTH THREE TIMES DAILY    sertraline (ZOLOFT) 50 MG tablet     tiZANidine (ZANAFLEX) 4 MG tablet Take 1 tablet (4 mg total) by mouth 3 (three) times daily as needed. Take 1/2 to 1 tab BID PRN muscle spasms. May cause drowsiness.    zolpidem (AMBIEN) 10 mg Tab Take 1 tablet (10 mg total) by mouth nightly as needed.   Last reviewed on 11/5/2020  7:59 AM by Kellie Hinson    Review of patient's allergies indicates:   Allergen Reactions    Cat/feline products     Chocolate flavor Itching    Dog hair standardized allergenic extract     Last reviewed on  11/5/2020 7:59 AM by Kellie Hinson      Tasks added this encounter   11/5/2020 - Refill Call   Tasks due within next 3 months   1/29/2021 - Clinical - Follow Up Assesement (90 day)     Kellie Hinson  [facility]  [address] [place] [id number]  Phone:  [phone]  Fax: [phone]

## 2020-11-05 NOTE — TELEPHONE ENCOUNTER
During refill call patient mentioned they had started taking Trazodone. Reviewed interactions with Copaxone - no interactions of clinical significance found; patient advised.

## 2020-11-16 DIAGNOSIS — G25.81 RESTLESS LEG: ICD-10-CM

## 2020-11-16 NOTE — PROGRESS NOTES
Refill Routing Note   Medication(s) are not appropriate for processing by [facility] for the following reason(s):     - Outside of protocol    [facility] actions taken in this encounter: Route          Medication reconciliation completed: No   Automatic Epic Generated Protocol Data:        Requested Prescriptions   Pending Prescriptions Disp Refills    rOPINIRole (REQUIP) 0.5 MG tablet 180 tablet 1     Sig: TAKE 1 TABLET(0.5 MG) BY MOUTH THREE TIMES DAILY       There is no refill protocol information for this order           Appointments  past 12m or future 3m with PCP    Date Provider   Last Visit   7/23/2020 Angel Simon MD   Next Visit   2/15/2021 Angel Simon MD   ED visits in past 90 days: 2        Note composed:4:35 PM 11/16/2020

## 2020-11-18 RX ORDER — ROPINIROLE 0.5 MG/1
TABLET, FILM COATED ORAL
Qty: 180 TABLET | Refills: 1 | Status: SHIPPED | OUTPATIENT
Start: 2020-11-18 | End: 2021-02-23

## 2020-12-01 DIAGNOSIS — G35 MULTIPLE SCLEROSIS: ICD-10-CM

## 2020-12-03 RX ORDER — GLATIRAMER 40 MG/ML
40 INJECTION, SOLUTION SUBCUTANEOUS
Qty: 12 ML | Refills: 5 | Status: SHIPPED | OUTPATIENT
Start: 2020-12-04

## 2020-12-05 ENCOUNTER — SPECIALTY PHARMACY (OUTPATIENT)
Dept: PHARMACY | Facility: CLINIC | Age: 50
End: 2020-12-05

## 2020-12-08 ENCOUNTER — PATIENT MESSAGE (OUTPATIENT)
Dept: PHARMACY | Facility: CLINIC | Age: 50
End: 2020-12-08

## 2020-12-11 NOTE — PROGRESS NOTES
Refill Routing Note   Medication(s) are not appropriate for processing by [facility] for the following reason(s):     - Outside of Protocol  [facility] action(s):   Route          Medication reconciliation completed: No   Automatic Epic Generated Protocol Data:        Requested Prescriptions   Pending Prescriptions Disp Refills    zolpidem (AMBIEN) 10 mg Tab 30 tablet 1     Sig: Take 1 tablet (10 mg total) by mouth nightly as needed.       There is no refill protocol information for this order           Appointments  past 12m or future 3m with PCP    Date Provider   Last Visit   7/23/2020 Angel Simon MD   Next Visit   2/15/2021 Angel Simon MD   ED visits in past 90 days: 1        Note composed:3:05 PM 12/11/2020

## 2020-12-14 ENCOUNTER — SPECIALTY PHARMACY (OUTPATIENT)
Dept: PHARMACY | Facility: CLINIC | Age: 50
End: 2020-12-14

## 2020-12-14 RX ORDER — ZOLPIDEM TARTRATE 10 MG/1
10 TABLET ORAL NIGHTLY PRN
Qty: 30 TABLET | Refills: 1 | Status: SHIPPED | OUTPATIENT
Start: 2020-12-14 | End: 2021-02-23 | Stop reason: SDUPTHER

## 2020-12-14 NOTE — TELEPHONE ENCOUNTER
12/14 [facility]   refill call attempt 3 for copaxone, was unable to LVM. Will pend accordingly and escalated to the pharmacist. Jaron

## 2020-12-24 NOTE — TELEPHONE ENCOUNTER
Call attempt 4- copaxone refill. No Answer. No voicemail available. [employer] message sent on 12/08 and read on 12/09. [employer] provider and sending post card to address on file.

## 2020-12-31 ENCOUNTER — TELEPHONE (OUTPATIENT)
Dept: NEUROLOGY | Facility: CLINIC | Age: 50
End: 2020-12-31

## 2020-12-31 NOTE — TELEPHONE ENCOUNTER
----- Message from Jaron Burk, [facility] sent at 12/24/2020 10:35 AM CST -----  Regarding: Copaxone  Dr Mills and Staff,    [facility] has been attempting to reach Mr. Haywood regarding prescription for Copaxone. After numerous unsuccessful attempts, we are sending a postcard to the address on file. At this point in time, no further contact will be made from [facility] until patient responds to our mail correspondence.    If there is anything else we can do to further assist, please let us know.     Thanks,  Jaron Burk  [facility]  P: [phone]

## 2021-02-02 ENCOUNTER — SPECIALTY PHARMACY (OUTPATIENT)
Dept: PHARMACY | Facility: CLINIC | Age: 51
End: 2021-02-02

## 2021-02-02 DIAGNOSIS — G35 MS (MULTIPLE SCLEROSIS): ICD-10-CM

## 2021-02-05 ENCOUNTER — PATIENT MESSAGE (OUTPATIENT)
Dept: NEUROLOGY | Facility: CLINIC | Age: 51
End: 2021-02-05

## 2021-02-18 ENCOUNTER — PATIENT MESSAGE (OUTPATIENT)
Dept: INTERNAL MEDICINE | Facility: CLINIC | Age: 51
End: 2021-02-18

## 2021-02-23 ENCOUNTER — OFFICE VISIT (OUTPATIENT)
Dept: INTERNAL MEDICINE | Facility: CLINIC | Age: 51
End: 2021-02-23
Payer: MEDICARE

## 2021-02-23 DIAGNOSIS — G25.81 RESTLESS LEG SYNDROME: Primary | ICD-10-CM

## 2021-02-23 DIAGNOSIS — G47.00 INSOMNIA, UNSPECIFIED TYPE: ICD-10-CM

## 2021-02-23 PROCEDURE — 99213 PR OFFICE/OUTPT VISIT, EST, LEVL III, 20-29 MIN: ICD-10-PCS | Mod: 95,,, | Performed by: FAMILY MEDICINE

## 2021-02-23 PROCEDURE — 99213 OFFICE O/P EST LOW 20 MIN: CPT | Mod: 95,,, | Performed by: FAMILY MEDICINE

## 2021-02-23 RX ORDER — ZOLPIDEM TARTRATE 10 MG/1
10 TABLET ORAL NIGHTLY PRN
Qty: 30 TABLET | Refills: 1 | Status: SHIPPED | OUTPATIENT
Start: 2021-02-23

## 2021-02-23 RX ORDER — ROPINIROLE 1 MG/1
1 TABLET, FILM COATED ORAL 3 TIMES DAILY
Qty: 270 TABLET | Refills: 1 | Status: SHIPPED | OUTPATIENT
Start: 2021-02-23 | End: 2022-02-23

## 2021-03-08 ENCOUNTER — PATIENT MESSAGE (OUTPATIENT)
Dept: PHARMACY | Facility: CLINIC | Age: 51
End: 2021-03-08

## 2021-03-17 ENCOUNTER — SPECIALTY PHARMACY (OUTPATIENT)
Dept: PHARMACY | Facility: CLINIC | Age: 51
End: 2021-03-17

## 2021-12-21 ENCOUNTER — PATIENT MESSAGE (OUTPATIENT)
Dept: NEUROLOGY | Facility: CLINIC | Age: 51
End: 2021-12-21
Payer: MEDICARE

## 2022-02-28 ENCOUNTER — PATIENT MESSAGE (OUTPATIENT)
Dept: PSYCHIATRY | Facility: CLINIC | Age: 52
End: 2022-02-28
Payer: MEDICARE

## 2022-06-24 ENCOUNTER — PATIENT MESSAGE (OUTPATIENT)
Dept: PSYCHIATRY | Facility: CLINIC | Age: 52
End: 2022-06-24
Payer: MEDICARE

## 2022-12-01 ENCOUNTER — PATIENT MESSAGE (OUTPATIENT)
Dept: PSYCHIATRY | Facility: CLINIC | Age: 52
End: 2022-12-01
Payer: MEDICARE

## 2022-12-29 ENCOUNTER — PATIENT OUTREACH (OUTPATIENT)
Dept: ADMINISTRATIVE | Facility: HOSPITAL | Age: 52
End: 2022-12-29
Payer: MEDICARE

## 2023-01-18 NOTE — TELEPHONE ENCOUNTER
"Phoned patient regarding his refill of copaxone at [facility]. Received the following message "Call cannot be completed at this time. Please hang up and try your call again later".  " Anesthesia Type: 1% lidocaine with epinephrine

## 2023-02-20 ENCOUNTER — PATIENT MESSAGE (OUTPATIENT)
Dept: PSYCHIATRY | Facility: CLINIC | Age: 53
End: 2023-02-20
Payer: MEDICARE

## 2023-07-21 ENCOUNTER — PATIENT MESSAGE (OUTPATIENT)
Dept: PSYCHIATRY | Facility: CLINIC | Age: 53
End: 2023-07-21
Payer: MEDICARE

## 2024-01-22 ENCOUNTER — PATIENT MESSAGE (OUTPATIENT)
Dept: PSYCHIATRY | Facility: CLINIC | Age: 54
End: 2024-01-22
Payer: MEDICARE

## 2024-05-02 ENCOUNTER — PATIENT MESSAGE (OUTPATIENT)
Dept: PSYCHIATRY | Facility: CLINIC | Age: 54
End: 2024-05-02
Payer: MEDICARE

## 2024-07-25 ENCOUNTER — PATIENT MESSAGE (OUTPATIENT)
Dept: PSYCHIATRY | Facility: CLINIC | Age: 54
End: 2024-07-25
Payer: MEDICARE

## 2024-08-05 ENCOUNTER — PATIENT MESSAGE (OUTPATIENT)
Dept: PSYCHIATRY | Facility: CLINIC | Age: 54
End: 2024-08-05
Payer: MEDICARE

## 2024-08-23 NOTE — ADDENDUM NOTE
Addended by: ELPIDIO MARTINEZ on: 5/7/2018 03:21 PM     Modules accepted: Orders     PROVIDER:[TOKEN:[[id number]],FOLLOWUP:[1 week]]

## 2024-12-16 ENCOUNTER — PATIENT MESSAGE (OUTPATIENT)
Dept: PSYCHIATRY | Facility: CLINIC | Age: 54
End: 2024-12-16
Payer: MEDICARE